# Patient Record
Sex: MALE | Race: OTHER
[De-identification: names, ages, dates, MRNs, and addresses within clinical notes are randomized per-mention and may not be internally consistent; named-entity substitution may affect disease eponyms.]

---

## 2019-08-30 ENCOUNTER — HOSPITAL ENCOUNTER (INPATIENT)
Dept: HOSPITAL 72 - EMR | Age: 74
LOS: 11 days | Discharge: NURSING, REHAB OR LONG-TERM CARE | DRG: 300 | End: 2019-09-10
Payer: COMMERCIAL

## 2019-08-30 VITALS — DIASTOLIC BLOOD PRESSURE: 62 MMHG | SYSTOLIC BLOOD PRESSURE: 141 MMHG

## 2019-08-30 VITALS — SYSTOLIC BLOOD PRESSURE: 138 MMHG | DIASTOLIC BLOOD PRESSURE: 77 MMHG

## 2019-08-30 VITALS — SYSTOLIC BLOOD PRESSURE: 146 MMHG | DIASTOLIC BLOOD PRESSURE: 72 MMHG

## 2019-08-30 VITALS — WEIGHT: 181 LBS | HEIGHT: 66 IN | BODY MASS INDEX: 29.09 KG/M2

## 2019-08-30 DIAGNOSIS — E11.65: ICD-10-CM

## 2019-08-30 DIAGNOSIS — Z79.4: ICD-10-CM

## 2019-08-30 DIAGNOSIS — E87.2: ICD-10-CM

## 2019-08-30 DIAGNOSIS — E11.52: Primary | ICD-10-CM

## 2019-08-30 DIAGNOSIS — E11.21: ICD-10-CM

## 2019-08-30 DIAGNOSIS — N32.0: ICD-10-CM

## 2019-08-30 DIAGNOSIS — Z79.84: ICD-10-CM

## 2019-08-30 DIAGNOSIS — L03.116: ICD-10-CM

## 2019-08-30 DIAGNOSIS — N18.3: ICD-10-CM

## 2019-08-30 DIAGNOSIS — E11.621: ICD-10-CM

## 2019-08-30 DIAGNOSIS — M86.8X7: ICD-10-CM

## 2019-08-30 DIAGNOSIS — N17.9: ICD-10-CM

## 2019-08-30 DIAGNOSIS — E86.0: ICD-10-CM

## 2019-08-30 DIAGNOSIS — E11.69: ICD-10-CM

## 2019-08-30 DIAGNOSIS — I12.9: ICD-10-CM

## 2019-08-30 DIAGNOSIS — E11.22: ICD-10-CM

## 2019-08-30 LAB
ADD MANUAL DIFF: NO
ALBUMIN SERPL-MCNC: 3.4 G/DL (ref 3.4–5)
ALBUMIN/GLOB SERPL: 0.8 {RATIO} (ref 1–2.7)
ALP SERPL-CCNC: 65 U/L (ref 46–116)
ALT SERPL-CCNC: 18 U/L (ref 12–78)
ANION GAP SERPL CALC-SCNC: 12 MMOL/L (ref 5–15)
APPEARANCE UR: CLEAR
APTT BLD: 29 SEC (ref 23–33)
APTT PPP: (no result) S
AST SERPL-CCNC: 14 U/L (ref 15–37)
BASOPHILS NFR BLD AUTO: 0.8 % (ref 0–2)
BILIRUB SERPL-MCNC: 0.3 MG/DL (ref 0.2–1)
BUN SERPL-MCNC: 39 MG/DL (ref 7–18)
CALCIUM SERPL-MCNC: 9.7 MG/DL (ref 8.5–10.1)
CHLORIDE SERPL-SCNC: 104 MMOL/L (ref 98–107)
CK MB SERPL-MCNC: 2.3 NG/ML (ref 0–3.6)
CK SERPL-CCNC: 135 U/L (ref 26–308)
CO2 SERPL-SCNC: 22 MMOL/L (ref 21–32)
CREAT SERPL-MCNC: 2.9 MG/DL (ref 0.55–1.3)
EOSINOPHIL NFR BLD AUTO: 1.6 % (ref 0–3)
ERYTHROCYTE [DISTWIDTH] IN BLOOD BY AUTOMATED COUNT: 10.4 % (ref 11.6–14.8)
GLOBULIN SER-MCNC: 4.5 G/DL
GLUCOSE UR STRIP-MCNC: (no result) MG/DL
HCT VFR BLD CALC: 32.5 % (ref 42–52)
HGB BLD-MCNC: 11.2 G/DL (ref 14.2–18)
INR PPP: 1 (ref 0.9–1.1)
KETONES UR QL STRIP: NEGATIVE
LEUKOCYTE ESTERASE UR QL STRIP: NEGATIVE
LYMPHOCYTES NFR BLD AUTO: 25.3 % (ref 20–45)
MCV RBC AUTO: 84 FL (ref 80–99)
MONOCYTES NFR BLD AUTO: 8.7 % (ref 1–10)
NEUTROPHILS NFR BLD AUTO: 63.7 % (ref 45–75)
NITRITE UR QL STRIP: NEGATIVE
PH UR STRIP: 5 [PH] (ref 4.5–8)
PLATELET # BLD: 297 K/UL (ref 150–450)
POTASSIUM SERPL-SCNC: 4.6 MMOL/L (ref 3.5–5.1)
PROT UR QL STRIP: (no result)
RBC # BLD AUTO: 3.85 M/UL (ref 4.7–6.1)
SODIUM SERPL-SCNC: 138 MMOL/L (ref 136–145)
SP GR UR STRIP: 1.01 (ref 1–1.03)
UROBILINOGEN UR-MCNC: NORMAL MG/DL (ref 0–1)
WBC # BLD AUTO: 10.2 K/UL (ref 4.8–10.8)

## 2019-08-30 PROCEDURE — 36569 INSJ PICC 5 YR+ W/O IMAGING: CPT

## 2019-08-30 PROCEDURE — 83605 ASSAY OF LACTIC ACID: CPT

## 2019-08-30 PROCEDURE — 85651 RBC SED RATE NONAUTOMATED: CPT

## 2019-08-30 PROCEDURE — 96367 TX/PROPH/DG ADDL SEQ IV INF: CPT

## 2019-08-30 PROCEDURE — 81003 URINALYSIS AUTO W/O SCOPE: CPT

## 2019-08-30 PROCEDURE — 87040 BLOOD CULTURE FOR BACTERIA: CPT

## 2019-08-30 PROCEDURE — 72195 MRI PELVIS W/O DYE: CPT

## 2019-08-30 PROCEDURE — 82962 GLUCOSE BLOOD TEST: CPT

## 2019-08-30 PROCEDURE — 84484 ASSAY OF TROPONIN QUANT: CPT

## 2019-08-30 PROCEDURE — 83735 ASSAY OF MAGNESIUM: CPT

## 2019-08-30 PROCEDURE — 80202 ASSAY OF VANCOMYCIN: CPT

## 2019-08-30 PROCEDURE — 93005 ELECTROCARDIOGRAM TRACING: CPT

## 2019-08-30 PROCEDURE — 76770 US EXAM ABDO BACK WALL COMP: CPT

## 2019-08-30 PROCEDURE — 82553 CREATINE MB FRACTION: CPT

## 2019-08-30 PROCEDURE — 87070 CULTURE OTHR SPECIMN AEROBIC: CPT

## 2019-08-30 PROCEDURE — 84300 ASSAY OF URINE SODIUM: CPT

## 2019-08-30 PROCEDURE — 85730 THROMBOPLASTIN TIME PARTIAL: CPT

## 2019-08-30 PROCEDURE — 76937 US GUIDE VASCULAR ACCESS: CPT

## 2019-08-30 PROCEDURE — 96361 HYDRATE IV INFUSION ADD-ON: CPT

## 2019-08-30 PROCEDURE — 85025 COMPLETE CBC W/AUTO DIFF WBC: CPT

## 2019-08-30 PROCEDURE — 99285 EMERGENCY DEPT VISIT HI MDM: CPT

## 2019-08-30 PROCEDURE — 87205 SMEAR GRAM STAIN: CPT

## 2019-08-30 PROCEDURE — 80048 BASIC METABOLIC PNL TOTAL CA: CPT

## 2019-08-30 PROCEDURE — 96365 THER/PROPH/DIAG IV INF INIT: CPT

## 2019-08-30 PROCEDURE — 82043 UR ALBUMIN QUANTITATIVE: CPT

## 2019-08-30 PROCEDURE — 82570 ASSAY OF URINE CREATININE: CPT

## 2019-08-30 PROCEDURE — 71045 X-RAY EXAM CHEST 1 VIEW: CPT

## 2019-08-30 PROCEDURE — 80053 COMPREHEN METABOLIC PANEL: CPT

## 2019-08-30 PROCEDURE — 83036 HEMOGLOBIN GLYCOSYLATED A1C: CPT

## 2019-08-30 PROCEDURE — 87181 SC STD AGAR DILUTION PER AGT: CPT

## 2019-08-30 PROCEDURE — 85610 PROTHROMBIN TIME: CPT

## 2019-08-30 PROCEDURE — 80061 LIPID PANEL: CPT

## 2019-08-30 PROCEDURE — 36415 COLL VENOUS BLD VENIPUNCTURE: CPT

## 2019-08-30 PROCEDURE — 93970 EXTREMITY STUDY: CPT

## 2019-08-30 PROCEDURE — 86140 C-REACTIVE PROTEIN: CPT

## 2019-08-30 PROCEDURE — 83690 ASSAY OF LIPASE: CPT

## 2019-08-30 PROCEDURE — 93925 LOWER EXTREMITY STUDY: CPT

## 2019-08-30 PROCEDURE — 83880 ASSAY OF NATRIURETIC PEPTIDE: CPT

## 2019-08-30 PROCEDURE — 82550 ASSAY OF CK (CPK): CPT

## 2019-08-30 RX ADMIN — ENOXAPARIN SODIUM SCH MG: 30 INJECTION SUBCUTANEOUS at 21:55

## 2019-08-30 NOTE — DIAGNOSTIC IMAGING REPORT
EXAM:

  XR Chest, 1 View

 

CLINICAL HISTORY:

  OSTEOMY

 

TECHNIQUE:

  Frontal view of the chest.

 

COMPARISON:

  No relevant prior studies available.

 

FINDINGS:

  Lungs:  No consolidation.

  Pleural space:  Unremarkable.  No pneumothorax.

  Heart:  Unremarkable.  No cardiomegaly.

  Mediastinum:  Unremarkable.

  Bones/joints:  No acute fracture.

 

IMPRESSION:     

  No acute cardiopulmonary disease.

## 2019-08-30 NOTE — HISTORY AND PHYSICAL
History of Present Illness


General


Date patient seen:  Aug 30, 2019


Time patient seen:  17:20


Reason for Hospitalization:  General Complaint





Present Illness


HPI


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 

left second toe.  This is been developing over the last couple of weeks after 

he used clippers to cut his toe nail and the whole nail was extracted 

accidentally.  He just came up from [place].  He is not taking antibiotics at 

this time.  He has peripheral neuropathy with diabetes and therefore the pain 

is not severe. He reports problems with his vision but denies eye surgeries in 

the past.  There is been no trauma and no drainage from the toe and there is 

foul smell identified.  Not seeing a foot specialist at this time.


Tetanus around 5 years ago.





Patient is a insulin-dependent diabetic.  His sugars have been mostly high.  

His wife helps him to maintain glucose control. Takes NPH 40 units AM and 20 

units PM.  He denies renal disease but states that his vision has been changing 

recently.  It is unclear whether he has had retinopathy or not. 





In the ED, antibiotics were started and admission is requested.


Allergies:  


Coded Allergies:  


     No Known Allergies (Unverified , 6/2/16)





Medication History


Scheduled


Hydromorphone Hcl/Pf (Hydromorphone Hcl 1 Mg/Ml Amp), 1 MG IVP Q4HR, (Reported)


Losartan Potassium* (Losartan Potassium*), 100 MG ORAL DAILY, (Reported)


Metoprolol Tartrate* (Metoprolol Tartrate*), Unknown Dose ORAL EVERY 12 HOURS, (

Reported)


Omeprazole (Omeprazole), Unknown Dose ORAL DAILY, (Reported)


Saline (Sodium Chloride), 10 ML IVF Q8HR, (Reported)





Scheduled PRN


Acetaminophen (Acetaminophen), 650 MG RC QID PRN for Fever/Headache/Mild Pain, (

Reported)





Miscellaneous Medications


Insulin Glargine (Lantus), 30 SUBQ, (Reported)


Ondansetron* (Zofran*), 4 MG IVP, (Reported)





Patient History


Healthcare decision maker





Resuscitation status





Advanced Directive on File








Review of Systems


All Other Systems:  negative except mentioned in HPI





Physical Exam


General Appearance:  WD/WN


Lines, tubes and drains:  peripheral, central line


HEENT:  normocephalic, atraumatic


Respiratory/Chest:  lungs clear


Cardiovascular/Chest:  normal peripheral pulses, normal rate, other - Left foot 

TA and DP 1 +


Abdomen:  non tender, soft


Extremities:  other - Left second toe with necrosis in the ungual bed and with 

foul smell


Neurologic:  CNs II-XII grossly normal, alert, oriented x 3, responsive





Last 24 Hour Vital Signs








  Date Time  Temp Pulse Resp B/P (MAP) Pulse Ox O2 Delivery O2 Flow Rate FiO2


 


8/30/19 17:00 98.2 104 18 141/62 98 Room Air  


 


8/30/19 17:00  104 18   Room Air  


 


8/30/19 16:04 98.2 107 18 163/69 (100) 98 Room Air  











Laboratory Tests








Test


  8/30/19


16:33 8/30/19


18:00


 


White Blood Count


  10.2 K/UL


(4.8-10.8) 


 


 


Red Blood Count


  3.85 M/UL


(4.70-6.10)  L 


 


 


Hemoglobin


  11.2 G/DL


(14.2-18.0)  L 


 


 


Hematocrit


  32.5 %


(42.0-52.0)  L 


 


 


Mean Corpuscular Volume 84 FL (80-99)   


 


Mean Corpuscular Hemoglobin


  29.0 PG


(27.0-31.0) 


 


 


Mean Corpuscular Hemoglobin


Concent 34.4 G/DL


(32.0-36.0) 


 


 


Red Cell Distribution Width


  10.4 %


(11.6-14.8)  L 


 


 


Platelet Count


  297 K/UL


(150-450) 


 


 


Mean Platelet Volume


  6.5 FL


(6.5-10.1) 


 


 


Neutrophils (%) (Auto)


  63.7 %


(45.0-75.0) 


 


 


Lymphocytes (%) (Auto)


  25.3 %


(20.0-45.0) 


 


 


Monocytes (%) (Auto)


  8.7 %


(1.0-10.0) 


 


 


Eosinophils (%) (Auto)


  1.6 %


(0.0-3.0) 


 


 


Basophils (%) (Auto)


  0.8 %


(0.0-2.0) 


 


 


Erythrocyte Sedimentation Rate


  91 MM/HR


(0-20)  H 


 


 


Prothrombin Time


  10.2 SEC


(9.30-11.50) 


 


 


Prothromb Time International


Ratio 1.0 (0.9-1.1)  


  


 


 


Activated Partial


Thromboplast Time 29 SEC (23-33)


  


 


 


Sodium Level


  138 MMOL/L


(136-145) 


 


 


Potassium Level


  4.6 MMOL/L


(3.5-5.1) 


 


 


Chloride Level


  104 MMOL/L


() 


 


 


Carbon Dioxide Level


  22 MMOL/L


(21-32) 


 


 


Anion Gap


  12 mmol/L


(5-15) 


 


 


Blood Urea Nitrogen


  39 mg/dL


(7-18)  H 


 


 


Creatinine


  2.9 MG/DL


(0.55-1.30)  H 


 


 


Estimat Glomerular Filtration


Rate  mL/min (>60)  


  


 


 


Glucose Level


  232 MG/DL


()  H 


 


 


Lactic Acid Level


  2.10 mmol/L


(0.4-2.0)  H 


 


 


Calcium Level


  9.7 MG/DL


(8.5-10.1) 


 


 


Magnesium Level


  2.0 MG/DL


(1.8-2.4) 


 


 


Total Bilirubin


  0.3 MG/DL


(0.2-1.0) 


 


 


Aspartate Amino Transf


(AST/SGOT) 14 U/L (15-37)


L 


 


 


Alanine Aminotransferase


(ALT/SGPT) 18 U/L (12-78)


  


 


 


Alkaline Phosphatase


  65 U/L


() 


 


 


Total Creatine Kinase


  135 U/L


() 


 


 


Creatine Kinase MB


  2.3 NG/ML


(0.0-3.6) 


 


 


Creatine Kinase MB Relative


Index 1.7  


  


 


 


Troponin I


  0.000 ng/mL


(0.000-0.056) 


 


 


Pro-B-Type Natriuretic Peptide


  1074 pg/mL


(0-125)  H 


 


 


Total Protein


  7.9 G/DL


(6.4-8.2) 


 


 


Albumin


  3.4 G/DL


(3.4-5.0) 


 


 


Globulin 4.5 g/dL   


 


Albumin/Globulin Ratio


  0.8 (1.0-2.7)


L 


 


 


Lipase


  134 U/L


() 


 


 


Urine Color  Pale yellow  


 


Urine Appearance  Clear  


 


Urine pH  5 (4.5-8.0)  


 


Urine Specific Gravity


  


  1.015


(1.005-1.035)


 


Urine Protein


  


  2+ (NEGATIVE)


H


 


Urine Glucose (UA)


  


  4+ (NEGATIVE)


H


 


Urine Ketones


  


  Negative


(NEGATIVE)


 


Urine Blood


  


  1+ (NEGATIVE)


H


 


Urine Nitrite


  


  Negative


(NEGATIVE)


 


Urine Bilirubin


  


  Negative


(NEGATIVE)


 


Urine Urobilinogen


  


  Normal MG/DL


(0.0-1.0)


 


Urine Leukocyte Esterase


  


  Negative


(NEGATIVE)


 


Urine RBC


  


  0 /HPF (0 - 0)


 


 


Urine WBC


  


  0-2 /HPF (0 -


0)


 


Urine Squamous Epithelial


Cells 


  Few /LPF


(NONE/OCC)


 


Urine Amorphous Sediment


  


  Few /LPF


(NONE)  H


 


Urine Bacteria


  


  Few /HPF


(NONE)








Height (Feet):  5


Height (Inches):  6.00


Weight (Pounds):  160


Medications





Current Medications








 Medications


  (Trade)  Dose


 Ordered  Sig/Sherly


 Route


 PRN Reason  Start Time


 Stop Time Status Last Admin


Dose Admin


 


 Acetaminophen


  (Tylenol)  650 mg  Q4H  PRN


 ORAL


 Mild Pain (Pain Scale 1-3)  8/30/19 18:30


 9/29/19 18:29   


 


 


 Acetaminophen/


 Hydrocodone Bitart


  (Norco 5/325)  1 tab  QIDPRN  PRN


 ORAL


 pain  8/30/19 18:30


 9/6/19 18:29   


 


 


 Cefepime HCl 1 gm/


 Sodium Chloride  55 ml @ 


 110 mls/hr  Q12H


 IV


   8/30/19 16:30


 8/31/19 16:29  8/30/19 16:39


 


 


 Dextrose


  (Dextrose 50%)  25 ml  Q30M  PRN


 IV


 Hypoglycemia  8/30/19 18:30


 9/29/19 18:29   


 


 


 Dextrose


  (Dextrose 50%)  50 ml  Q30M  PRN


 IV


 Hypoglycemia  8/30/19 18:30


 9/29/19 18:29   


 


 


 Enoxaparin Sodium


  (Lovenox)  30 mg  QHS


 SUBQ


   8/30/19 21:00


 9/29/19 20:59   


 


 


 Morphine Sulfate


  (Morphine


 Sulfate)  1 mg  Q2H  PRN


 IVP


 For Pain  8/30/19 18:30


 9/6/19 18:29   


 


 


 Pantoprazole


  (Protonix)  40 mg  DAILY


 ORAL


   8/31/19 09:00


 9/30/19 08:59   


 


 


 Polyethylene


 Glycol


  (Miralax)  17 gm  HSPRN  PRN


 ORAL


 Constipation  8/30/19 18:30


 9/29/19 18:29   


 


 


 Sodium Chloride  1,000 ml @ 


 100 mls/hr  Q10H


 IVLG


   8/30/19 20:00


 8/31/19 19:59   


 


 


 Sodium Chloride  1,000 ml @ 


 300 mls/hr  Q3H20M


 IV


   8/30/19 16:30


 9/29/19 16:29  8/30/19 16:39


 











Assessment/Plan


Status:  stable


Assessment/Plan:


72 y/o male admitted with L second toe foul smelling necrosis.





# L second toe distal dry gangrene


# Diabetic foot ulcer


Cefepime and Vancomycin started in the ER, will renally dose


ESR and CRP added


Consider ID consult in AM if needed after surgical evaluation by Dr. Meléndez. 

Possible need for vascular and podiatry consult





# Diabetic nephropathy 


# CKD 4


Patient denies prior renal disease and nephrology was consulted. Dr. Torres


IVF and monitor BMP


Add UA with microalbumin





# IDDM


Likely poorly controlled due to renal, ophtalmic and peripheral vascular disease


HbA1c


Titrate insulin and consider endocrinology input if A1c > 10





# Disposition


Inpatient


PT evaluation for possible need of assistive devices.





FULL CODE


DVT and GI ppx











Belle Castro MD Aug 30, 2019 18:43

## 2019-08-30 NOTE — EMERGENCY ROOM REPORT
History of Present Illness


General


Chief Complaint:  General Complaint


Source:  Patient, Significant Other, Medical Record





Present Illness


HPI


Patient presents with a discolored and smelly left second toe.  This is been 

developing over the last couple of weeks.  He just came up from [place].  He is 

not taking antibiotics at this time.  He has peripheral neuropathy with 

diabetes and therefore the pain is not severe.  There is been no trauma and no 

drainage from the toe.  Not seeing a foot specialist at this time.  Complains 

of occasional chills but no documented fever.  There is no drainage from the 

toe.  He does have some swelling in his lower extremities but denies calf pain.

  The pain 5/10, aching worse when the foot is dependent when there is 

throbbing.  He denies radiation of the pain.


Tetanus around 5 years ago.





Patient is a insulin-dependent diabetic.  His sugars have been mostly high.  

His wife helps him to maintain glucose control.  He denies renal disease but 

states that his vision has been changing recently.  It is unclear whether he 

has had retinopathy.





No sore throat, chest pain, palpitations, nausea, vomiting, diarrhea, dysuria, 

abdominal pain, shortness of breath, depression, anxiety, headache.





He was last admitted in 2016 for possible cholecystitis.


Allergies:  


Coded Allergies:  


     No Known Allergies (Unverified , 6/2/16)





Patient History


Past Medical History:  see triage record, old chart reviewed


Social History:  Denies: smoking, alcohol use, drug use


Social History Narrative


Born in [facility]


Reviewed Nursing Documentation:  PMH: Agreed; PSxH: Agreed





Nursing Documentation-PMH


Past Medical History:  No History, Except For


Hx Hypertension:  Yes


Hx COPD:  No - BPH


Hx Diabetes:  Yes





Review of Systems


All Other Systems:  negative except mentioned in HPI





Physical Exam





Vital Signs








  Date Time  Temp Pulse Resp B/P (MAP) Pulse Ox O2 Delivery O2 Flow Rate FiO2


 


8/30/19 16:04 98.2 107 18 163/69 (100) 98 Room Air  








Sp02 EP Interpretation:  reviewed, normal


General Appearance:  well appearing, no apparent distress, GCS 15


Head:  normocephalic


Eyes:  bilateral eye normal inspection, bilateral eye PERRL, bilateral eye EOMI


ENT:  moist mucus membranes


Neck:  supple


Respiratory:  lungs clear, normal breath sounds


Cardiovascular #1:  regular rate, rhythm, no JVD, no murmur, edema - 1-2+


Cardiovascular #2:  2+ dorsalis pedis (R), 2+ dorsalis pedis (L)


Gastrointestinal:  normal inspection, normal bowel sounds, non tender, no mass, 

non-distended


Musculoskeletal:  back normal, gait/station normal, normal range of motion


Neurologic:  alert, oriented x3


Psychiatric:  mood/affect normal


Skin:  other - Gangrenous left second toe with some erythema of the foot





Medical Decision Making


Diagnostic Impression:  


 Primary Impression:  


 Gangrene of toe of left foot


 Additional Impressions:  


 Elevated lactic acid level


 Hyperglycemia


 Renal failure


 Qualified Codes:  N19 - Unspecified kidney failure


ER Course


Patient presents with gangrenous left second toe.  Frontal includes acute gas 

gangrene, dry gangrene, osteomyelitis amongst others.  Evaluation will be with 

EKG, chest x-ray, foot x-ray and labs.  The patient will receive antibiotics 

after blood cultures have been performed.





EKG without injury.  CXR no infiltrate.  Foot with changes toe soft tissue.  

Min elevated WBC.  Elevated ESR.  Renal failure.  Elevated glucose.





Elevated lactic acid.  VS stable.  Bolus given.  Antibiotics begun.





Evaluated by Dr. Castro.  Consult Dr. Meléndez.





Admit med floor.





Improved lactate.  Discussed with patient renal disease.





Laboratory Tests








Test


  8/30/19


16:33 8/30/19


16:38 8/30/19


18:00 8/30/19


18:10


 


White Blood Count


  10.2 K/UL


(4.8-10.8) 


  


  


 


 


Red Blood Count


  3.85 M/UL


(4.70-6.10)  L 


  


  


 


 


Hemoglobin


  11.2 G/DL


(14.2-18.0)  L 


  


  


 


 


Hematocrit


  32.5 %


(42.0-52.0)  L 


  


  


 


 


Mean Corpuscular Volume 84 FL (80-99)     


 


Mean Corpuscular Hemoglobin


  29.0 PG


(27.0-31.0) 


  


  


 


 


Mean Corpuscular Hemoglobin


Concent 34.4 G/DL


(32.0-36.0) 


  


  


 


 


Red Cell Distribution Width


  10.4 %


(11.6-14.8)  L 


  


  


 


 


Platelet Count


  297 K/UL


(150-450) 


  


  


 


 


Mean Platelet Volume


  6.5 FL


(6.5-10.1) 


  


  


 


 


Neutrophils (%) (Auto)


  63.7 %


(45.0-75.0) 


  


  


 


 


Lymphocytes (%) (Auto)


  25.3 %


(20.0-45.0) 


  


  


 


 


Monocytes (%) (Auto)


  8.7 %


(1.0-10.0) 


  


  


 


 


Eosinophils (%) (Auto)


  1.6 %


(0.0-3.0) 


  


  


 


 


Basophils (%) (Auto)


  0.8 %


(0.0-2.0) 


  


  


 


 


Erythrocyte Sedimentation Rate


  91 MM/HR


(0-20)  H 


  


  


 


 


Prothrombin Time


  10.2 SEC


(9.30-11.50) 


  


  


 


 


Prothrombin Time INR 1.0 (0.9-1.1)     


 


PTT


  29 SEC (23-33)


  


  


  


 


 


Sodium Level


  138 MMOL/L


(136-145) 


  


  


 


 


Potassium Level


  4.6 MMOL/L


(3.5-5.1) 


  


  


 


 


Chloride Level


  104 MMOL/L


() 


  


  


 


 


Carbon Dioxide Level


  22 MMOL/L


(21-32) 


  


  


 


 


Anion Gap


  12 mmol/L


(5-15) 


  


  


 


 


Blood Urea Nitrogen


  39 mg/dL


(7-18)  H 


  


  


 


 


Creatinine


  2.9 MG/DL


(0.55-1.30)  H 


  


  


 


 


Estimate Glomerular


Filtration Rate  mL/min (>60)  


  


  


  


 


 


Glucose Level


  232 MG/DL


()  H 


  


  


 


 


Lactic Acid Level


  2.10 mmol/L


(0.4-2.0)  H 


  


  1.50 mmol/L


(0.66-2.22)


 


Calcium Level


  9.7 MG/DL


(8.5-10.1) 


  


  


 


 


Magnesium Level


  2.0 MG/DL


(1.8-2.4) 


  


  


 


 


Total Bilirubin


  0.3 MG/DL


(0.2-1.0) 


  


  


 


 


Aspartate Amino Transferase


(AST) 14 U/L (15-37)


L 


  


  


 


 


Alanine Aminotransferase (ALT)


  18 U/L (12-78)


  


  


  


 


 


Alkaline Phosphatase


  65 U/L


() 


  


  


 


 


Total Creatine Kinase


  135 U/L


() 


  


  


 


 


Creatine Kinase MB


  2.3 NG/ML


(0.0-3.6) 


  


  


 


 


Creatine Kinase MB Relative


Index 1.7  


  


  


  


 


 


Troponin I


  0.000 ng/mL


(0.000-0.056) 


  


  


 


 


Pro-B-Type Natriuretic Peptide


  1074 pg/mL


(0-125)  H 


  


  


 


 


Total Protein


  7.9 G/DL


(6.4-8.2) 


  


  


 


 


Albumin


  3.4 G/DL


(3.4-5.0) 


  


  


 


 


Globulin 4.5 g/dL     


 


Albumin/Globulin Ratio


  0.8 (1.0-2.7)


L 


  


  


 


 


Lipase


  134 U/L


() 


  


  


 


 


C-Reactive Protein,


Quantitative 


  3.3 mg/dL


(0.00-0.90)  H 


  


 


 


Urine Color   Pale yellow   


 


Urine Appearance   Clear   


 


Urine pH   5 (4.5-8.0)   


 


Urine Specific Gravity


  


  


  1.015


(1.005-1.035) 


 


 


Urine Protein


  


  


  2+ (NEGATIVE)


H 


 


 


Urine Glucose (UA)


  


  


  4+ (NEGATIVE)


H 


 


 


Urine Ketones


  


  


  Negative


(NEGATIVE) 


 


 


Urine Blood


  


  


  1+ (NEGATIVE)


H 


 


 


Urine Nitrite


  


  


  Negative


(NEGATIVE) 


 


 


Urine Bilirubin


  


  


  Negative


(NEGATIVE) 


 


 


Urine Urobilinogen


  


  


  Normal MG/DL


(0.0-1.0) 


 


 


Urine Leukocyte Esterase


  


  


  Negative


(NEGATIVE) 


 


 


Urine RBC


  


  


  0 /HPF (0 - 0)


  


 


 


Urine WBC


  


  


  0-2 /HPF (0 -


0) 


 


 


Urine Squamous Epithelial


Cells 


  


  Few /LPF


(NONE/OCC) 


 


 


Urine Amorphous Sediment


  


  


  Few /LPF


(NONE)  H 


 


 


Urine Bacteria


  


  


  Few /HPF


(NONE) 


 


 


Urine Random Creatinine   Pending   


 


Urine Random Microalbumin   Pending   


 


Urine Random Sodium


  


  


  60 mmol/L


() 


 


 


Urine Creatinine


  


  


  78.7 MG/DL


(30.0-125.0) 


 


 


Urine Microalbumin/Creatinine


Ratio 


  


  Pending  


  


 








EKG Diagnostic Results


Rate:  tachycardiac


Rhythm:  NSR


ST Segments:  no acute changes - low voltage





Rhythm Strip Diag. Results


EP Interpretation:  yes


Rhythm:  no PVC's, no ectopy, other - Sinus tachycardia





Chest X-Ray Diagnostic Results


Chest X-Ray Diagnostic Results :  


   Chest X-Ray Ordered:  Yes


   # of Views/Limited/Complete:  1 View


   Indication:  Other


   EP Interpretation:  Yes


   Interpretation:  no consolidation, no effusion, no pneumothorax


   Impression:  No acute disease


   Electronically Signed by:  Electronically signed by Reji Montalvo MD





Other X-Ray Diagnostic Results


Other X-Ray Diagnostic Results :  


   X-Ray ordered:  Left foot


   # of Views/Limited Vs Complete:  3 View


   Indication:  Other


   EP Interpretation:  Yes


   Interpretation:  no dislocation, no fractures, other - Osteopenia, vascular 

calcifications and soft tissue changes second toe


   Impression:  Other


   Electronically Signed by:  Electronically signed by Reji Montalvo MD





Last Vital Signs








  Date Time  Temp Pulse Resp B/P (MAP) Pulse Ox O2 Delivery O2 Flow Rate FiO2


 


8/31/19 00:00 99.0 95 18 125/60 (81) 97   


 


8/30/19 23:04      Room Air  








Status:  improved


Disposition:  ADMITTED AS INPATIENT


Condition:  Serious











Reji Montalvo MD Aug 30, 2019 16:27

## 2019-08-30 NOTE — NUR
HAND-OFF: 

Report given to BRANDON Guajardo. no orders to carry. report to [place] unit will be 
given at 1930.

## 2019-08-30 NOTE — NUR
NURSE NOTES:

Patient came from ER via gurney. A&OX4. IV site patent and intact. Belongings are checked. 
Noted infected 2nd left toe, picture taken. Bed in lowest position. Call light within reach. 
Will continue to monitor.

## 2019-08-30 NOTE — DIAGNOSTIC IMAGING REPORT
EXAM:

  XR Left Foot Complete, 3 or More Views

 

CLINICAL HISTORY:

  OSTEOMY

 

TECHNIQUE:

  Frontal, lateral and oblique views of the left foot.

 

COMPARISON:

  No relevant prior studies available.

 

FINDINGS:

  Bones/joints:  No acute fracture.  Degenerative changes.  Plantar and 

posterior calcaneal spurs.  Osteopenia.

  Soft tissues:  Soft tissue swelling.

  Vasculature:  Vascular calcifications.

 

IMPRESSION:     

  No acute fracture.

## 2019-08-30 NOTE — NUR
ER Nurse Note:



Pt a&ox4, VSS, baseline HR >100, RA, no signs of distress. Pt denies pain, n/v, 
no resp distress. Pt uses urinal at bedside. Black toe on LT foot, second 
digit, denies pain. Hx of DM. Dinner tray at bedside. Report given to BRANDON Davenport 
in [place] for continuity of care.

## 2019-08-30 NOTE — NUR
ED Nurse Note:





pt walked in c/o left 2nd toe pain, pt reports he's been having wound more than 
15 days but unable to recall exactly how any days. noted 2nd toe blackened 
discoloration with tenderness and foul odor on 2nd toe, no drainage at this 
time, will cont monitor.

## 2019-08-31 VITALS — DIASTOLIC BLOOD PRESSURE: 60 MMHG | SYSTOLIC BLOOD PRESSURE: 131 MMHG

## 2019-08-31 VITALS — SYSTOLIC BLOOD PRESSURE: 125 MMHG | DIASTOLIC BLOOD PRESSURE: 60 MMHG

## 2019-08-31 VITALS — DIASTOLIC BLOOD PRESSURE: 62 MMHG | SYSTOLIC BLOOD PRESSURE: 130 MMHG

## 2019-08-31 VITALS — SYSTOLIC BLOOD PRESSURE: 134 MMHG | DIASTOLIC BLOOD PRESSURE: 59 MMHG

## 2019-08-31 VITALS — DIASTOLIC BLOOD PRESSURE: 50 MMHG | SYSTOLIC BLOOD PRESSURE: 110 MMHG

## 2019-08-31 VITALS — SYSTOLIC BLOOD PRESSURE: 142 MMHG | DIASTOLIC BLOOD PRESSURE: 64 MMHG

## 2019-08-31 LAB
ADD MANUAL DIFF: NO
ANION GAP SERPL CALC-SCNC: 9 MMOL/L (ref 5–15)
BASOPHILS NFR BLD AUTO: 0.9 % (ref 0–2)
BUN SERPL-MCNC: 27 MG/DL (ref 7–18)
CALCIUM SERPL-MCNC: 8.3 MG/DL (ref 8.5–10.1)
CHLORIDE SERPL-SCNC: 111 MMOL/L (ref 98–107)
CHOLEST SERPL-MCNC: 106 MG/DL (ref ?–200)
CO2 SERPL-SCNC: 21 MMOL/L (ref 21–32)
CREAT SERPL-MCNC: 2.3 MG/DL (ref 0.55–1.3)
EOSINOPHIL NFR BLD AUTO: 1.6 % (ref 0–3)
ERYTHROCYTE [DISTWIDTH] IN BLOOD BY AUTOMATED COUNT: 11.2 % (ref 11.6–14.8)
HCT VFR BLD CALC: 29.5 % (ref 42–52)
HDLC SERPL-MCNC: 21 MG/DL (ref 40–60)
HGB BLD-MCNC: 9.8 G/DL (ref 14.2–18)
LYMPHOCYTES NFR BLD AUTO: 19.2 % (ref 20–45)
MCV RBC AUTO: 88 FL (ref 80–99)
MONOCYTES NFR BLD AUTO: 7.3 % (ref 1–10)
NEUTROPHILS NFR BLD AUTO: 71 % (ref 45–75)
PLATELET # BLD: 265 K/UL (ref 150–450)
POTASSIUM SERPL-SCNC: 4 MMOL/L (ref 3.5–5.1)
RBC # BLD AUTO: 3.37 M/UL (ref 4.7–6.1)
SODIUM SERPL-SCNC: 141 MMOL/L (ref 136–145)
TRIGL SERPL-MCNC: 138 MG/DL (ref 30–150)
WBC # BLD AUTO: 8.9 K/UL (ref 4.8–10.8)

## 2019-08-31 RX ADMIN — HUMAN INSULIN SCH UNITS: 100 INJECTION, SUSPENSION SUBCUTANEOUS at 06:19

## 2019-08-31 RX ADMIN — HUMAN INSULIN SCH UNITS: 100 INJECTION, SUSPENSION SUBCUTANEOUS at 17:47

## 2019-08-31 RX ADMIN — ENOXAPARIN SODIUM SCH MG: 30 INJECTION SUBCUTANEOUS at 20:59

## 2019-08-31 RX ADMIN — LOSARTAN POTASSIUM SCH MG: 50 TABLET, FILM COATED ORAL at 09:08

## 2019-08-31 RX ADMIN — SODIUM CHLORIDE SCH MLS/HR: 0.9 INJECTION INTRAVENOUS at 16:12

## 2019-08-31 NOTE — CONSULTATION
History of Present Illness


General


Reason for Hospitalization:  General Complaint





Present Illness


HPI


73 year old male presents with a discolored, necrotic, and foul smelling left 

second toe/ray.  This is been developing over the last couple of weeks.  He 

just came up from [place].  He is not taking antibiotics at this time.  He has 

peripheral neuropathy with diabetes and therefore the pain is not severe.  

There is been no trauma and no drainage from the toe.  Not seeing a foot 

specialist at this time.  Complains of occasional chills but no documented 

fever.  There is no drainage from the toe.  He does have some swelling in his 

lower extremities but denies calf pain.  The pain 5/10, aching worse when the 

foot is dependent when there is throbbing.  He denies radiation of the pain.


Tetanus around 5 years ago. Patient is a insulin-dependent diabetic.  His 

sugars have been mostly high.  His wife helps him to maintain glucose control.  

He denies renal disease but states that his vision has been changing recently.  

It is unclear whether he has had retinopathy. Surgery called to evaluate and 

assist with care


patient states he is happy to be in hospital and wants care for his wound as he 

does not understand how this happened.


Allergies:  


Coded Allergies:  


     No Known Allergies (Unverified , 6/2/16)





Medication History


Scheduled


Hydromorphone Hcl/Pf (Hydromorphone Hcl 1 Mg/Ml Amp), 1 MG IVP Q4HR, (Reported)


Losartan Potassium* (Losartan Potassium*), 100 MG ORAL DAILY, (Reported)


Metoprolol Tartrate* (Metoprolol Tartrate*), Unknown Dose ORAL EVERY 12 HOURS, (

Reported)


Omeprazole (Omeprazole), Unknown Dose ORAL DAILY, (Reported)


Saline (Sodium Chloride), 10 ML IVF Q8HR, (Reported)





Scheduled PRN


Acetaminophen (Acetaminophen), 650 MG RC QID PRN for Fever/Headache/Mild Pain, (

Reported)





Miscellaneous Medications


Insulin Glargine (Lantus), 30 SUBQ, (Reported)


Ondansetron* (Zofran*), 4 MG IVP, (Reported)





Patient History


History Provided By:  Patient, Medical Record, PMD


Healthcare decision maker





Resuscitation status


Full Code


Advanced Directive on File








Past Medical/Surgical History


Past Medical/Surgical History:  


(1) Cholecystitis with cholelithiasis


(2) Dehydration


(3) Fever


(4) Cholelithiasis


(5) HTN (hypertension)


(6) DM (diabetes mellitus)


(7) Elevated LFTs


(8) Renal insufficiency


(9) Biliary colic


(10) Hyperglycemia


(11) Renal failure


(12) Elevated lactic acid level


(13) Gangrene of toe of left foot





Review of Systems


Review of Symptoms


General ROS: no weight loss or fever


Psychological ROS: no depression or mood changes, no memory loss


Ophthalmic ROS: no visual changes or eye irritation


ENT ROS: no nasal congestion, hearing loss, dizziness


Allergy and Immunology ROS: no allergic symptoms or urticaria


Hematological and Lymphatic ROS: no swollen glands, unusual bleeding or bruising


Endocrine ROS: no polyuria, polydipsia, weight changes, temperature intolerance


Respiratory ROS: no cough, shortness of breath, or wheezing


Cardiovascular ROS: no chest pain or dyspnea on exertion


Gastrointestinal ROS: denies abdominal pain, no bright red blood in stool.


Musculoskeletal ROS: no myalgias or arthralgias


Neurological ROS: no TIA or stroke symptoms


Dermatological ROS: no new or changing skin lesions, rashes or pruritis





Physical Exam


Physical Exam


General appearance:  alert, cooperative, no distress, appears stated age


Head:  Normocephalic, without obvious abnormality, atraumatic


Eyes:  conjunctivae/corneas clear. PERRL, EOM's intact. Fundi benign


Throat:  Lips, mucosa, and tongue normal. Teeth and gums normal


Neck:  supple, symmetrical, trachea midline, no adenopathy, thyroid: not 

enlarged, symmetric, no tenderness/mass/nodules, no carotid bruit and no JVD


Lungs:  clear to auscultation bilaterally


Heart:  regular rate and rhythm, S1, S2 normal, no murmur, click, rub or gallop


Abdomen:  soft, non-tender. Bowel sounds normal. No masses,  no organomegaly


Extremities:  extremities normal, atraumatic, necrotic left second toe. foul 

odor. no drainage 


Pulses:  2+ and symmetric


Skin:  Skin color, texture, turgor normal. No rashes or lesions


Neurologic:  Grossly normal





Last 24 Hour Vital Signs








  Date Time  Temp Pulse Resp B/P (MAP) Pulse Ox O2 Delivery O2 Flow Rate FiO2


 


8/31/19 16:00 98.8 83 19 134/59 (84) 99   


 


8/31/19 12:00 98.0 80 17 110/50 (70) 99   


 


8/31/19 09:08    131/60    


 


8/31/19 09:00      Room Air  


 


8/31/19 08:00 97.9 81 19 131/60 (83) 97   


 


8/31/19 04:00 98.2 84 18 130/62 (84) 97   


 


8/31/19 00:00 99.0 95 18 125/60 (81) 97   


 


8/30/19 23:04      Room Air  


 


8/30/19 21:54  111  130/63    


 


8/30/19 21:00      Room Air  


 


8/30/19 19:40 98.1 110 18 146/72 98 Room Air  


 


8/30/19 19:40 98.1 110 18 146/72 98 Room Air  


 


8/30/19 18:55 98.0 101 18 138/77 98 Room Air  

















Intake and Output  


 


 8/30/19 8/31/19





 19:00 07:00


 


Intake Total 3860 ml 1020 ml


 


Balance 3860 ml 1020 ml


 


  


 


Intake Oral 30 ml 120 ml


 


IV Total 3830 ml 900 ml


 


# Voids  3


 


# Bowel Movements  1











Laboratory Tests








Test


  8/30/19


22:50 8/31/19


06:00


 


Lactic Acid Level


  1.50 mmol/L


(0.4-2.0) 


 


 


White Blood Count


  


  8.9 K/UL


(4.8-10.8)


 


Red Blood Count


  


  3.37 M/UL


(4.70-6.10)  L


 


Hemoglobin


  


  9.8 G/DL


(14.2-18.0)  L


 


Hematocrit


  


  29.5 %


(42.0-52.0)  L


 


Mean Corpuscular Volume  88 FL (80-99)  


 


Mean Corpuscular Hemoglobin


  


  28.9 PG


(27.0-31.0)


 


Mean Corpuscular Hemoglobin


Concent 


  33.1 G/DL


(32.0-36.0)


 


Red Cell Distribution Width


  


  11.2 %


(11.6-14.8)  L


 


Platelet Count


  


  265 K/UL


(150-450)


 


Mean Platelet Volume


  


  6.5 FL


(6.5-10.1)


 


Neutrophils (%) (Auto)


  


  71.0 %


(45.0-75.0)


 


Lymphocytes (%) (Auto)


  


  19.2 %


(20.0-45.0)  L


 


Monocytes (%) (Auto)


  


  7.3 %


(1.0-10.0)


 


Eosinophils (%) (Auto)


  


  1.6 %


(0.0-3.0)


 


Basophils (%) (Auto)


  


  0.9 %


(0.0-2.0)


 


Sodium Level


  


  141 MMOL/L


(136-145)


 


Potassium Level


  


  4.0 MMOL/L


(3.5-5.1)


 


Chloride Level


  


  111 MMOL/L


()  H


 


Carbon Dioxide Level


  


  21 MMOL/L


(21-32)


 


Anion Gap


  


  9 mmol/L


(5-15)


 


Blood Urea Nitrogen


  


  27 mg/dL


(7-18)  H


 


Creatinine


  


  2.3 MG/DL


(0.55-1.30)  H


 


Estimat Glomerular Filtration


Rate 


   mL/min (>60)  


 


 


Glucose Level


  


  147 MG/DL


()  H


 


Hemoglobin A1c


  


  10.4 %


(4.3-6.0)  H


 


Calcium Level


  


  8.3 MG/DL


(8.5-10.1)  L


 


Triglycerides Level


  


  138 MG/DL


()


 


Cholesterol Level


  


  106 MG/DL (<


200)


 


LDL Cholesterol


  


  63 mg/dL


(<100)


 


HDL Cholesterol


  


  21 MG/DL


(40-60)  L


 


Cholesterol/HDL Ratio


  


  5.0 (3.3-4.4)


H


 


Random Vancomycin Level  6.9 ug/mL  








Height (Feet):  5


Height (Inches):  6.00


Weight (Pounds):  160


Medications





Current Medications








 Medications


  (Trade)  Dose


 Ordered  Sig/Sherly


 Route


 PRN Reason  Start Time


 Stop Time Status Last Admin


Dose Admin


 


 Acetaminophen


  (Tylenol)  650 mg  Q4H  PRN


 ORAL


 Mild Pain (Pain Scale 1-3)  8/30/19 18:30


 9/29/19 18:29   


 


 


 Acetaminophen/


 Hydrocodone Bitart


  (Norco 5/325)  1 tab  QIDPRN  PRN


 ORAL


 pain  8/30/19 18:30


 9/6/19 18:29   


 


 


 Cefepime HCl 1 gm/


 Dextrose  55 ml @ 


 110 mls/hr  Q24H


 IVPB


   8/31/19 16:00


 9/7/19 15:59  8/31/19 16:12


 


 


 Dextrose


  (Dextrose 50%)  25 ml  Q30M  PRN


 IV


 Hypoglycemia  8/30/19 18:30


 9/29/19 18:29   


 


 


 Dextrose


  (Dextrose 50%)  50 ml  Q30M  PRN


 IV


 Hypoglycemia  8/30/19 18:30


 9/29/19 18:29   


 


 


 Enoxaparin Sodium


  (Lovenox)  30 mg  QHS


 SUBQ


   8/30/19 21:00


 9/29/19 20:59  8/30/19 21:55


 


 


 Insulin Human NPH


  (Humulin N)  20 units  BEFORE  DINNER


 SUBQ


   8/31/19 16:30


 9/30/19 16:29  8/31/19 17:47


 


 


 Insulin Human NPH


  (Humulin N)  40 units  BEFORE  BREAKFAST


 SUBQ


   8/31/19 06:30


 9/30/19 06:29  8/31/19 06:19


 


 


 Losartan Potassium


  (Cozaar)  100 mg  DAILY


 ORAL


   8/31/19 09:00


 9/30/19 08:59  8/31/19 09:08


 


 


 Metoprolol


 Tartrate


  (Lopressor)  100 mg  BEDTIME


 ORAL


   8/31/19 21:00


 9/30/19 20:59   


 


 


 Morphine Sulfate


  (Morphine


 Sulfate)  1 mg  Q2H  PRN


 IVP


 For Pain  8/30/19 18:30


 9/6/19 18:29   


 


 


 Pantoprazole


  (Protonix)  40 mg  DAILY


 ORAL


   8/31/19 09:00


 9/30/19 08:59  8/31/19 09:08


 


 


 Polyethylene


 Glycol


  (Miralax)  17 gm  HSPRN  PRN


 ORAL


 Constipation  8/30/19 18:30


 9/29/19 18:29   


 


 


 Sodium Chloride  1,000 ml @ 


 100 mls/hr  Q10H


 IVLG


   8/30/19 20:00


 8/31/19 19:59  8/31/19 09:00


 


 


 Vancomycin HCl


  (Vanco rx to


 dose)  1 ea  DAILY  PRN


 MISC


 Per rx protocol  8/30/19 18:30


 9/29/19 18:29   


 











Assessment/Plan


Problem List:  


(1) Elevated lactic acid level


Assessment & Plan:  Patient presented with lactic acidosis, cellulitis and 

infection of his left foot with necrosis and gangrene of the left second toe.


Continue with his resuscitation


Okay for diet


ICD Codes:  R79.89 - Other specified abnormal findings of blood chemistry


SNOMED:  3030863


(2) Gangrene of toe of left foot


Assessment & Plan:  Patient presents with left second ray gangrene dry foul-

smelling no purulent drainage edema and cellulitis of the foot.  Has had it for 

a few weeks and worsening since his trip to [place].


Plain films noted no fracture


Recommend MRI of the left foot which can be performed when available IV 

antibiotics as per infectious disease





Swab necrotic area of left foot with Betadine wash foot daily apply gauze 

dressing


We will follow with recommendations as results of imaging are available


We will attempt to salvage as much possible but potentially may need an 

amputation


Strict glucose monitoring and control


Thank you will follow with recommendations


ICD Codes:  I96 - Gangrene, not elsewhere classified


SNOMED:  58555370154874658


(3) DM (diabetes mellitus)


ICD Codes:  E11.9 - Type 2 diabetes mellitus without complications


SNOMED:  63785135











Raciel Meléndez Aug 31, 2019 18:22

## 2019-08-31 NOTE — GENERAL PROGRESS NOTE
Assessment/Plan


Problem List:  


(1) Hyperglycemia


ICD Codes:  R73.9 - Hyperglycemia, unspecified


SNOMED:  75803838


(2) Renal failure


ICD Codes:  N19 - Unspecified kidney failure


SNOMED:  38190792


Qualifiers:  


   Qualified Codes:  N19 - Unspecified kidney failure


(3) Elevated lactic acid level


ICD Codes:  R79.89 - Other specified abnormal findings of blood chemistry


SNOMED:  7859195


(4) Gangrene of toe of left foot


ICD Codes:  I96 - Gangrene, not elsewhere classified


SNOMED:  43799726865501445


(5) DM (diabetes mellitus)


ICD Codes:  E11.9 - Type 2 diabetes mellitus without complications


SNOMED:  58276957


Status:  stable


Assessment/Plan:


72 y/o male admitted with L second toe foul smelling necrosis.





# L second toe distal dry gangrene


# Diabetic foot ulcer


Cefepime and Vancomycin- renal dose 


ESR and CRP added


Possible need for vascular and podiatry consult


MRI foot 


Surgical consult appreciated 


Lactic acid trending down 








# Diabetic nephropathy 


# CKD 4


Patient denies prior renal disease and nephrology was consulted. Dr. Torres


IVF and monitor BMP


UA with microalbumin





# IDDM


Likely poorly controlled due to renal, ophthalmic and peripheral vascular 

disease


HbA1c


Titrate insulin and consider endocrinology input if A1c > 10





# Disposition


PT evaluation for possible need of assistive devices.





FULL CODE


DVT and GI ppx





Subjective


Date patient seen:  Aug 31, 2019


ROS Limited/Unobtainable:  No


Constitutional:  Denies: no symptoms, chills, diaphoresis, fever, malaise, 

weakness, other


HEENT:  Denies: no symptoms, eye pain, blurred vision, tearing, double vision, 

ear pain, ear discharge, nose pain, nose congestion, throat pain, throat 

swelling, mouth pain, mouth swelling, other


Cardiovascular:  Denies: no symptoms, chest pain, edema, irregular heart rate, 

lightheadedness, palpitations, syncope, other


Gastrointestinal/Abdominal:  Denies: no symptoms, abdomen distended, abdominal 

pain, black stools, tarry stools, blood in stool, constipated, diarrhea, 

difficulty swallowing, nausea, poor appetite, poor fluid intake, rectal bleeding

, vomiting, other


Genitourinary:  Denies: no symptoms, burning, discharge, frequency, flank pain, 

hematuria, incontinence, pain, urgency, other


Neurologic/Psychiatric:  Denies: no symptoms, anxiety, depressed, emotional 

problems, headache, numbness, paresthesia, pre-existing deficit, seizure, 

tingling, tremors, weakness, other


Endocrine:  Denies: no symptoms, excessive sweating, flushing, intolerance to 

cold, intolerance to heat, increased hunger, increased thirst, increased urine, 

unexplained weight gain, unexplained weight loss, other


Hematologic/Lymphatic:  Denies: no symptoms, anemia, easy bleeding, easy 

bruising, other


Allergies:  


Coded Allergies:  


     No Known Allergies (Unverified , 6/2/16)


Subjective


Seen and examined


Denies pain. 


he is resting comfortably





Objective





Last 24 Hour Vital Signs








  Date Time  Temp Pulse Resp B/P (MAP) Pulse Ox O2 Delivery O2 Flow Rate FiO2


 


8/31/19 16:00 98.8 83 19 134/59 (84) 99   


 


8/31/19 12:00 98.0 80 17 110/50 (70) 99   


 


8/31/19 09:08    131/60    


 


8/31/19 09:00      Room Air  


 


8/31/19 08:00 97.9 81 19 131/60 (83) 97   


 


8/31/19 04:00 98.2 84 18 130/62 (84) 97   


 


8/31/19 00:00 99.0 95 18 125/60 (81) 97   


 


8/30/19 23:04      Room Air  


 


8/30/19 21:54  111  130/63    


 


8/30/19 21:00      Room Air  

















Intake and Output  


 


 8/30/19 8/31/19





 19:00 07:00


 


Intake Total 3860 ml 1020 ml


 


Balance 3860 ml 1020 ml


 


  


 


Intake Oral 30 ml 120 ml


 


IV Total 3830 ml 900 ml


 


# Voids  3


 


# Bowel Movements  1








Laboratory Tests


8/30/19 22:50: Lactic Acid Level 1.50


8/31/19 06:00: 


White Blood Count 8.9, Red Blood Count 3.37L, Hemoglobin 9.8L, Hematocrit 29.5L

, Mean Corpuscular Volume 88, Mean Corpuscular Hemoglobin 28.9, Mean 

Corpuscular Hemoglobin Concent 33.1, Red Cell Distribution Width 11.2L, 

Platelet Count 265, Mean Platelet Volume 6.5, Neutrophils (%) (Auto) 71.0, 

Lymphocytes (%) (Auto) 19.2L, Monocytes (%) (Auto) 7.3, Eosinophils (%) (Auto) 

1.6, Basophils (%) (Auto) 0.9, Sodium Level 141, Potassium Level 4.0, Chloride 

Level 111H, Carbon Dioxide Level 21, Anion Gap 9, Blood Urea Nitrogen 27H, 

Creatinine 2.3H, Estimat Glomerular Filtration Rate , Glucose Level 147H, 

Hemoglobin A1c 10.4H, Calcium Level 8.3L, Triglycerides Level 138, Cholesterol 

Level 106, LDL Cholesterol 63, HDL Cholesterol 21L, Cholesterol/HDL Ratio 5.0H, 

Random Vancomycin Level 6.9


Height (Feet):  5


Height (Inches):  6.00


Weight (Pounds):  160


Objective


General appearance:  alert, cooperative, no distress, appears stated age


Head:  Normocephalic, without obvious abnormality, atraumatic


Eyes:  conjunctivae/corneas clear. PERRL, EOM's intact. Fundi benign


Throat:  Lips, mucosa, and tongue normal. Teeth and gums normal


Neck:  supple, symmetrical, trachea midline, no adenopathy, thyroid: not 

enlarged, symmetric, no tenderness/mass/nodules, no carotid bruit and no JVD


Lungs:  clear to auscultation bilaterally


Heart:  regular rate and rhythm, S1, S2 normal, no murmur, click, rub or gallop


Abdomen:  soft, non-tender. Bowel sounds normal. No masses,  no organomegaly


Extremities:  extremities normal, atraumatic, necrotic left second toe. foul 

odor. no drainage 


Pulses:  2+ and symmetric


Skin:  Skin color, texture, turgor normal. No rashes or lesions


Neurologic:  Grossly normal











Guillermo Tompkins M.D. Aug 31, 2019 19:40

## 2019-08-31 NOTE — NUR
PT Note

PT eval completed, treatment initiated. Patient is untrained in the use of a FWW  with PWB 
on the LLE. Patient needs PT to instruct and train patient on the use of the FWW to minimize 
pressure on the left foot.

-------------------------------------------------------------------------------

Addendum: 08/31/19 at 1713 by EMMY ERICKSON PT

-------------------------------------------------------------------------------

Amended: Links added.

## 2019-08-31 NOTE — NUR
NURSE NOTES:

Rn offered lactulos and docusate. Patient only consumed half of it. Patient refused to take 
more waving her hands and closing her mouth and pushed the medication cup. RN explained the 
risks and benefits.Patient had 4 times of bowel movement.Will continue to monitor.

## 2019-08-31 NOTE — NUR
NURSE NOTES:

Received patient in bed, awake, alert and oriented x4. Not in respiratory/cardiac distress. 
No s/s of hypo/hyperglycemia. Patient is in mild pain on his left 2nd toe but bearable 
without pain medication @ this time. Bed is in lowest position and locked. Call light within 
reach. Will continue plan of care.

## 2019-08-31 NOTE — NUR
NURSE NOTES:

Report received from [employer]. Patient aao x 4. Breathing unlabored without distress, 
discomfort, or sob. Denies pain at this time except for on the left second toe that is 
consistent. Will follow appropriately. IV on the left antecubital intact running fluid as 
ordered. Bed placed at the lowest with brakes and side rails up for safety. Call light 
placed within reach and encouraged to use. Will continue to monitor and provide care as 
ordered.

## 2019-09-01 VITALS — SYSTOLIC BLOOD PRESSURE: 151 MMHG | DIASTOLIC BLOOD PRESSURE: 67 MMHG

## 2019-09-01 VITALS — DIASTOLIC BLOOD PRESSURE: 69 MMHG | SYSTOLIC BLOOD PRESSURE: 116 MMHG

## 2019-09-01 VITALS — SYSTOLIC BLOOD PRESSURE: 141 MMHG | DIASTOLIC BLOOD PRESSURE: 71 MMHG

## 2019-09-01 VITALS — SYSTOLIC BLOOD PRESSURE: 129 MMHG | DIASTOLIC BLOOD PRESSURE: 71 MMHG

## 2019-09-01 VITALS — SYSTOLIC BLOOD PRESSURE: 141 MMHG | DIASTOLIC BLOOD PRESSURE: 58 MMHG

## 2019-09-01 VITALS — SYSTOLIC BLOOD PRESSURE: 140 MMHG | DIASTOLIC BLOOD PRESSURE: 71 MMHG

## 2019-09-01 LAB
ADD MANUAL DIFF: NO
ALBUMIN SERPL-MCNC: 2.7 G/DL (ref 3.4–5)
ALBUMIN/GLOB SERPL: 0.7 {RATIO} (ref 1–2.7)
ALP SERPL-CCNC: 57 U/L (ref 46–116)
ALT SERPL-CCNC: 14 U/L (ref 12–78)
ANION GAP SERPL CALC-SCNC: 9 MMOL/L (ref 5–15)
APTT BLD: 32 SEC (ref 23–33)
AST SERPL-CCNC: 15 U/L (ref 15–37)
BASOPHILS NFR BLD AUTO: 0.8 % (ref 0–2)
BILIRUB SERPL-MCNC: 0.4 MG/DL (ref 0.2–1)
BUN SERPL-MCNC: 21 MG/DL (ref 7–18)
CALCIUM SERPL-MCNC: 8.8 MG/DL (ref 8.5–10.1)
CHLORIDE SERPL-SCNC: 111 MMOL/L (ref 98–107)
CO2 SERPL-SCNC: 22 MMOL/L (ref 21–32)
CREAT SERPL-MCNC: 1.9 MG/DL (ref 0.55–1.3)
EOSINOPHIL NFR BLD AUTO: 2.9 % (ref 0–3)
ERYTHROCYTE [DISTWIDTH] IN BLOOD BY AUTOMATED COUNT: 11.1 % (ref 11.6–14.8)
GLOBULIN SER-MCNC: 4 G/DL
HCT VFR BLD CALC: 31.2 % (ref 42–52)
HGB BLD-MCNC: 10.3 G/DL (ref 14.2–18)
INR PPP: 1 (ref 0.9–1.1)
LYMPHOCYTES NFR BLD AUTO: 27.1 % (ref 20–45)
MCV RBC AUTO: 88 FL (ref 80–99)
MONOCYTES NFR BLD AUTO: 7.2 % (ref 1–10)
NEUTROPHILS NFR BLD AUTO: 62 % (ref 45–75)
PLATELET # BLD: 263 K/UL (ref 150–450)
POTASSIUM SERPL-SCNC: 4.1 MMOL/L (ref 3.5–5.1)
RBC # BLD AUTO: 3.56 M/UL (ref 4.7–6.1)
SODIUM SERPL-SCNC: 142 MMOL/L (ref 136–145)
WBC # BLD AUTO: 8.1 K/UL (ref 4.8–10.8)

## 2019-09-01 RX ADMIN — LOSARTAN POTASSIUM SCH MG: 50 TABLET, FILM COATED ORAL at 09:08

## 2019-09-01 RX ADMIN — SODIUM CHLORIDE SCH MLS/HR: 0.9 INJECTION INTRAVENOUS at 16:29

## 2019-09-01 RX ADMIN — HUMAN INSULIN SCH UNITS: 100 INJECTION, SUSPENSION SUBCUTANEOUS at 06:33

## 2019-09-01 RX ADMIN — HUMAN INSULIN SCH UNITS: 100 INJECTION, SUSPENSION SUBCUTANEOUS at 16:31

## 2019-09-01 RX ADMIN — ENOXAPARIN SODIUM SCH MG: 30 INJECTION SUBCUTANEOUS at 21:33

## 2019-09-01 NOTE — NUR
NURSE NOTES:

Patient's previous IV site infiltrated and leaking. Placed new 22g IV on the right forearm. 
Explained procedure before and during the procedure. Patient agreed and tolerated the 
procedure well. New IV intact, dry, clean, and patent.

## 2019-09-01 NOTE — GENERAL PROGRESS NOTE
Assessment/Plan


Problem List:  


(1) Hyperglycemia


ICD Codes:  R73.9 - Hyperglycemia, unspecified


SNOMED:  54064222


(2) Renal failure


ICD Codes:  N19 - Unspecified kidney failure


SNOMED:  81219994


Qualifiers:  


   Qualified Codes:  N19 - Unspecified kidney failure


(3) Elevated lactic acid level


ICD Codes:  R79.89 - Other specified abnormal findings of blood chemistry


SNOMED:  2966634


(4) Gangrene of toe of left foot


ICD Codes:  I96 - Gangrene, not elsewhere classified


SNOMED:  [id number]


(5) DM (diabetes mellitus)


ICD Codes:  E11.9 - Type 2 diabetes mellitus without complications


SNOMED:  93141844


Status:  stable


Assessment/Plan:


72 y/o male admitted with L second toe foul smelling necrosis.





# L second toe distal dry gangrene


# Diabetic foot ulcer


Cefepime and Vancomycin- renal dose 


ESR and CRP added


Possible need for vascular and podiatry consult


MRI foot pending 


Surgical consult appreciated 


Lactic acid trending down 








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 


Patient denies prior renal disease and nephrology was consulted. Dr. Torres


IVF and monitor BMP


UA with microalbumin


Renal US 


Hold Losartan 





# IDDM- poorly controlled- HbA1c 10.4


finger stick glucose has been less than 200 on NPH 40 AM, 20 PM 


diabetic diet 





# Disposition


PT evaluation for possible need of assistive devices.





FULL CODE


DVT and GI ppx





Subjective


Date patient seen:  Sep 1, 2019


ROS Limited/Unobtainable:  No


Constitutional:  Denies: no symptoms, chills, diaphoresis, fever, malaise, 

weakness, other


HEENT:  Denies: no symptoms, eye pain, blurred vision, tearing, double vision, 

ear pain, ear discharge, nose pain, nose congestion, throat pain, throat 

swelling, mouth pain, mouth swelling, other


Cardiovascular:  Denies: no symptoms, chest pain, edema, irregular heart rate, 

lightheadedness, palpitations, syncope, other


Respiratory:  Denies: no symptoms, cough, orthopnea, shortness of breath, SOB 

with excertion, SOB at rest, sputum, stridor, wheezing, other


Gastrointestinal/Abdominal:  Denies: no symptoms, abdomen distended, abdominal 

pain, black stools, tarry stools, blood in stool, constipated, diarrhea, 

difficulty swallowing, nausea, poor appetite, poor fluid intake, rectal bleeding

, vomiting, other


Endocrine:  Denies: no symptoms, excessive sweating, flushing, intolerance to 

cold, intolerance to heat, increased hunger, increased thirst, increased urine, 

unexplained weight gain, unexplained weight loss, other


Allergies:  


Coded Allergies:  


     No Known Allergies (Unverified , 6/2/16)


Subjective


Seen and examined


c/o mild pain in the toe. 


he is resting comfortably





Objective





Last 24 Hour Vital Signs








  Date Time  Temp Pulse Resp B/P (MAP) Pulse Ox O2 Delivery O2 Flow Rate FiO2


 


9/1/19 12:00 98.7 83 18 140/71 (94) 98   


 


9/1/19 09:08    141/58    


 


9/1/19 09:00      Room Air  


 


9/1/19 08:00 97.3 83 18 141/58 (85) 98   


 


9/1/19 04:00 97.9 76 20 141/71 (94) 79   


 


9/1/19 00:00 97.8 77 18 129/71 (90) 99   


 


8/31/19 21:00      Room Air  


 


8/31/19 20:54  89  141/68    


 


8/31/19 20:00 97.9 89 20 142/64 (90) 99   


 


8/31/19 16:00 98.8 83 19 134/59 (84) 99   

















Intake and Output  


 


 8/31/19 9/1/19





 18:59 06:59


 


Intake Total 1875.000 ml 200 ml


 


Output Total  1000 ml


 


Balance 1875.000 ml -800 ml


 


  


 


IV Total 375.000 ml 200 ml


 


Other 1500 ml 


 


Output Urine Total  1000 ml


 


# Voids  2








Laboratory Tests


9/1/19 07:15: 


White Blood Count 8.1, Red Blood Count 3.56L, Hemoglobin 10.3L, Hematocrit 31.2L

, Mean Corpuscular Volume 88, Mean Corpuscular Hemoglobin 28.9, Mean 

Corpuscular Hemoglobin Concent 33.0, Red Cell Distribution Width 11.1L, 

Platelet Count 263, Mean Platelet Volume 6.3L, Neutrophils (%) (Auto) 62.0, 

Lymphocytes (%) (Auto) 27.1, Monocytes (%) (Auto) 7.2, Eosinophils (%) (Auto) 

2.9, Basophils (%) (Auto) 0.8, Erythrocyte Sedimentation Rate 64H, Prothrombin 

Time 10.3, Prothromb Time International Ratio 1.0, Activated Partial 

Thromboplast Time 32, Sodium Level 142, Potassium Level 4.1, Chloride Level 111H

, Carbon Dioxide Level 22, Anion Gap 9, Blood Urea Nitrogen 21H, Creatinine 1.9H

, Estimat Glomerular Filtration Rate , Glucose Level 148H, Calcium Level 8.8, 

Total Bilirubin 0.4, Aspartate Amino Transf (AST/SGOT) 15, Alanine 

Aminotransferase (ALT/SGPT) 14, Alkaline Phosphatase 57, C-Reactive Protein, 

Quantitative 4.0H, Total Protein 6.7, Albumin 2.7L, Globulin 4.0, Albumin/

Globulin Ratio 0.7L


Height (Feet):  5


Height (Inches):  6.00


Weight (Pounds):  160


Objective


General appearance:  alert, cooperative, no distress, appears stated age


Head:  Normocephalic, without obvious abnormality, atraumatic


Eyes:  conjunctivae/corneas clear. PERRL, EOM's intact. Fundi benign


Throat:  Lips, mucosa, and tongue normal. Teeth and gums normal


Neck:  supple, symmetrical, trachea midline, no adenopathy, thyroid: not 

enlarged, symmetric, no tenderness/mass/nodules, no carotid bruit and no JVD


Lungs:  clear to auscultation bilaterally


Heart:  regular rate and rhythm, S1, S2 normal, no murmur, click, rub or gallop


Abdomen:  soft, non-tender. Bowel sounds normal. No masses,  no organomegaly


Extremities:  extremities normal, atraumatic, necrotic left second toe. foul 

odor. no drainage 


Pulses:  2+ and symmetric


Skin:  Skin color, texture, turgor normal. No rashes or lesions


Neurologic:  Grossly normal











Guillermo Tompkins M.D. Sep 1, 2019 15:26

## 2019-09-01 NOTE — NUR
NURSE NOTES:

Received pt resting in bed. AAO x 4. On room air. IV site intact and patent. Dressing on L 
foot dry and intact. No c/o pain and no acute distress noted at this time. Bed locked, 
lowest position, alarm on, side rails up x 2, call light within reach. Will continue to 
monitor.

## 2019-09-01 NOTE — CONSULTATION
History of Present Illness


General


Chief Complaint:  General Complaint


Reason for Consultation:  acute kidney injury





Present Illness


HPI


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 

left second toe.  This is been developing over the last couple of weeks after 

he used clippers to cut his toe nail and the whole nail was extracted 

accidentally.  He just came up from [place].  He is not taking antibiotics at 

this time.  He has peripheral neuropathy with diabetes and therefore the pain 

is not severe. He reports problems with his vision but denies eye surgeries in 

the past.  There is been no trauma and no drainage from the toe and there is 

foul smell identified.  Not seeing a foot specialist at this time.Tetanus 

around 5 years ago. Patient is a insulin-dependent diabetic.  His sugars have 

been mostly high.  His wife helps him to maintain glucose control. Takes NPH 40 

units AM and 20 units PM.  He denies renal disease but states that his vision 

has been changing recently.  It is unclear whether he has had retinopathy or 

not. 





Cr on presentation noted to be 2.3- improved to 1.9 with hydration


Urine microalbumin/cr ration pending


Allergies:  


Coded Allergies:  


     No Known Allergies (Unverified , 6/2/16)





Medication History


Scheduled


Hydromorphone Hcl/Pf (Hydromorphone Hcl 1 Mg/Ml Amp), 1 MG IVP Q4HR, (Reported)


Losartan Potassium* (Losartan Potassium*), 100 MG ORAL DAILY, (Reported)


Metoprolol Tartrate* (Metoprolol Tartrate*), Unknown Dose ORAL EVERY 12 HOURS, (

Reported)


Omeprazole (Omeprazole), Unknown Dose ORAL DAILY, (Reported)


Saline (Sodium Chloride), 10 ML IVF Q8HR, (Reported)





Scheduled PRN


Acetaminophen (Acetaminophen), 650 MG RC QID PRN for Fever/Headache/Mild Pain, (

Reported)





Miscellaneous Medications


Insulin Glargine (Lantus), 30 SUBQ, (Reported)


Ondansetron* (Zofran*), 4 MG IVP, (Reported)





Patient History


Healthcare decision maker





Resuscitation status


Full Code


Advanced Directive on File








Review of Systems


Constitutional:  Denies: no symptoms, see HPI, chills, sweats, fever, malaise, 

weakness, other


Eye:  Denies: no symptoms, see HPI, eye pain, blurred vision, tearing, double 

vision, nose pain, nose congestion, acuity changes, discharge, other


ENT:  Denies: no symptoms, see HPI, ear pain, ear discharge, nose pain, nose 

congestion, throat pain, throat swelling, mouth pain, hearing loss, nasal 

discharge, other


Respiratory:  Denies: no symptoms, see HPI, cough, orthopnea, shortness of 

breath, stridor, wheezing, ROSS, sputum, other


Cardiovascular:  Denies: no symptoms, see HPI, chest pain, edema, palpitations, 

syncope, PND, other


Gastrointestinal:  Denies: no symptoms, see HPI, abdominal pain, constipation, 

diarrhea, nausea, vomiting, melena, hematemesis, other


Genitourinary:  Denies: no symptoms, see HPI, discharge, dysuria, frequency, 

hematuria, pain, retention, incontinence, urgency, vag bleed/dc, other


Musculoskeletal:  Denies: no symptoms, see HPI, back pain, gout, joint pain, 

joint swelling, muscle pain, muscle stiffness, other


Skin:  Denies: no symptoms, see HPI, rash, change in color, change in hair/nails

, dryness, lesions, other


Neurological:  Denies: no symptoms, see HPI, headache, numbness, paresthesia, 

seizure, tingling, tremors, focal weakness, syncope, dizziness, other





Physical Exam


General Appearance:  WD/WN, no apparent distress


HEENT:  normocephalic, atraumatic


Neck:  non-tender, supple


Respiratory/Chest:  chest wall non-tender, lungs clear


Cardiovascular/Chest:  no JVD


Abdomen:  normal bowel sounds, non tender


Extremities:  normal range of motion, non-tender, other - + gangrene of foot


Neurologic:  CNs II-XII grossly normal





Last 24 Hour Vital Signs








  Date Time  Temp Pulse Resp B/P (MAP) Pulse Ox O2 Delivery O2 Flow Rate FiO2


 


9/1/19 09:08    141/58    


 


9/1/19 09:00      Room Air  


 


9/1/19 08:00 97.3 83 18 141/58 (85) 98   


 


9/1/19 04:00 97.9 76 20 141/71 (94) 79   


 


9/1/19 00:00 97.8 77 18 129/71 (90) 99   


 


8/31/19 21:00      Room Air  


 


8/31/19 20:54  89  141/68    


 


8/31/19 20:00 97.9 89 20 142/64 (90) 99   


 


8/31/19 16:00 98.8 83 19 134/59 (84) 99   

















Intake and Output  


 


 8/31/19 9/1/19





 18:59 06:59


 


Intake Total 1875.000 ml 200 ml


 


Output Total  1000 ml


 


Balance 1875.000 ml -800 ml


 


  


 


IV Total 375.000 ml 200 ml


 


Other 1500 ml 


 


Output Urine Total  1000 ml


 


# Voids  2











Laboratory Tests








Test


  9/1/19


07:15


 


White Blood Count


  8.1 K/UL


(4.8-10.8)


 


Red Blood Count


  3.56 M/UL


(4.70-6.10)  L


 


Hemoglobin


  10.3 G/DL


(14.2-18.0)  L


 


Hematocrit


  31.2 %


(42.0-52.0)  L


 


Mean Corpuscular Volume 88 FL (80-99)  


 


Mean Corpuscular Hemoglobin


  28.9 PG


(27.0-31.0)


 


Mean Corpuscular Hemoglobin


Concent 33.0 G/DL


(32.0-36.0)


 


Red Cell Distribution Width


  11.1 %


(11.6-14.8)  L


 


Platelet Count


  263 K/UL


(150-450)


 


Mean Platelet Volume


  6.3 FL


(6.5-10.1)  L


 


Neutrophils (%) (Auto)


  62.0 %


(45.0-75.0)


 


Lymphocytes (%) (Auto)


  27.1 %


(20.0-45.0)


 


Monocytes (%) (Auto)


  7.2 %


(1.0-10.0)


 


Eosinophils (%) (Auto)


  2.9 %


(0.0-3.0)


 


Basophils (%) (Auto)


  0.8 %


(0.0-2.0)


 


Erythrocyte Sedimentation Rate


  64 MM/HR


(0-20)  H


 


Prothrombin Time


  10.3 SEC


(9.30-11.50)


 


Prothromb Time International


Ratio 1.0 (0.9-1.1)  


 


 


Activated Partial


Thromboplast Time 32 SEC (23-33)


 


 


Sodium Level


  142 MMOL/L


(136-145)


 


Potassium Level


  4.1 MMOL/L


(3.5-5.1)


 


Chloride Level


  111 MMOL/L


()  H


 


Carbon Dioxide Level


  22 MMOL/L


(21-32)


 


Anion Gap


  9 mmol/L


(5-15)


 


Blood Urea Nitrogen


  21 mg/dL


(7-18)  H


 


Creatinine


  1.9 MG/DL


(0.55-1.30)  H


 


Estimat Glomerular Filtration


Rate  mL/min (>60)  


 


 


Glucose Level


  148 MG/DL


()  H


 


Calcium Level


  8.8 MG/DL


(8.5-10.1)


 


Total Bilirubin


  0.4 MG/DL


(0.2-1.0)


 


Aspartate Amino Transf


(AST/SGOT) 15 U/L (15-37)


 


 


Alanine Aminotransferase


(ALT/SGPT) 14 U/L (12-78)


 


 


Alkaline Phosphatase


  57 U/L


()


 


C-Reactive Protein,


Quantitative 4.0 mg/dL


(0.00-0.90)  H


 


Total Protein


  6.7 G/DL


(6.4-8.2)


 


Albumin


  2.7 G/DL


(3.4-5.0)  L


 


Globulin 4.0 g/dL  


 


Albumin/Globulin Ratio


  0.7 (1.0-2.7)


L








Height (Feet):  5


Height (Inches):  6.00


Weight (Pounds):  160


Medications





Current Medications








 Medications


  (Trade)  Dose


 Ordered  Sig/Sherly


 Route


 PRN Reason  Start Time


 Stop Time Status Last Admin


Dose Admin


 


 Acetaminophen


  (Tylenol)  650 mg  Q4H  PRN


 ORAL


 Mild Pain (Pain Scale 1-3)  8/30/19 18:30


 9/29/19 18:29   


 


 


 Acetaminophen/


 Hydrocodone Bitart


  (Norco 5/325)  1 tab  QIDPRN  PRN


 ORAL


 pain  8/30/19 18:30


 9/6/19 18:29   


 


 


 Cefepime HCl 1 gm/


 Dextrose  55 ml @ 


 110 mls/hr  Q24H


 IVPB


   8/31/19 16:00


 9/7/19 15:59  8/31/19 16:12


 


 


 Dextrose


  (Dextrose 50%)  25 ml  Q30M  PRN


 IV


 Hypoglycemia  8/30/19 18:30


 9/29/19 18:29   


 


 


 Dextrose


  (Dextrose 50%)  50 ml  Q30M  PRN


 IV


 Hypoglycemia  8/30/19 18:30


 9/29/19 18:29   


 


 


 Enoxaparin Sodium


  (Lovenox)  30 mg  QHS


 SUBQ


   8/30/19 21:00


 9/29/19 20:59  8/31/19 20:59


 


 


 Insulin Human NPH


  (Humulin N)  20 units  BEFORE  DINNER


 SUBQ


   8/31/19 16:30


 9/30/19 16:29  8/31/19 17:47


 


 


 Insulin Human NPH


  (Humulin N)  40 units  BEFORE  BREAKFAST


 SUBQ


   8/31/19 06:30


 9/30/19 06:29  9/1/19 06:33


 


 


 Losartan Potassium


  (Cozaar)  100 mg  DAILY


 ORAL


   8/31/19 09:00


 9/30/19 08:59  9/1/19 09:08


 


 


 Metoprolol


 Tartrate


  (Lopressor)  100 mg  BEDTIME


 ORAL


   8/31/19 21:00


 9/30/19 20:59  8/31/19 20:54


 


 


 Morphine Sulfate


  (Morphine


 Sulfate)  1 mg  Q2H  PRN


 IVP


 For Pain  8/30/19 18:30


 9/6/19 18:29   


 


 


 Pantoprazole


  (Protonix)  40 mg  DAILY


 ORAL


   8/31/19 09:00


 9/30/19 08:59  9/1/19 09:07


 


 


 Polyethylene


 Glycol


  (Miralax)  17 gm  HSPRN  PRN


 ORAL


 Constipation  8/30/19 18:30


 9/29/19 18:29   


 


 


 Vancomycin HCl


  (Vanco rx to


 dose)  1 ea  DAILY  PRN


 MISC


 Per rx protocol  8/30/19 18:30


 9/29/19 18:29   


 











Assessment/Plan


Problem List:  


(1) Hyperglycemia


ICD Codes:  R73.9 - Hyperglycemia, unspecified


SNOMED:  29493880


(2) Elevated lactic acid level


ICD Codes:  R79.89 - Other specified abnormal findings of blood chemistry


SNOMED:  1932818


(3) Gangrene of toe of left foot


ICD Codes:  I96 - Gangrene, not elsewhere classified


SNOMED:  86298553479010815


(4) Renal failure


ICD Codes:  N19 - Unspecified kidney failure


SNOMED:  31401586


Qualifiers:  


   Qualified Codes:  N19 - Unspecified kidney failure


(5) Dehydration


ICD Codes:  E86.0 - Dehydration


SNOMED:  36331308


(6) HTN (hypertension)


ICD Codes:  I10 - Essential (primary) hypertension


SNOMED:  15986441


(7) DM (diabetes mellitus)


ICD Codes:  E11.9 - Type 2 diabetes mellitus without complications


SNOMED:  61083063


Diagnosis


Axis I:


# Acute kidney injury on possibley CKD stage III- likely diabetic nephropathy


# L second toe distal dry gangrene


# IDDM


# HTN


# Diabetic foot ulcer





- hold losartan for now


- continue to monitor off IVF- resume when/if NPO


- continue metop 100 at night


- add amlodipine 5mg daily 


- check renal US


- cefepime and Vancomycin- renal dose 


- avoid supratherapeutic vanco level


- podiatry/surgery Kyler Villareal M.D. Sep 1, 2019 13:36

## 2019-09-01 NOTE — NUR
NURSE NOTES:

Received patient in bed, awake, alert and oriented x4. Not in respiratory/cardiac distress. 
No s/s of hypo/hyperglycemia. Denies pain or discomfort.Dressing on left foot intact. Bed is 
in lowest position and locked. Call light within reach. Will continue plan of care.

## 2019-09-01 NOTE — SURGERY PROGRESS NOTE
Surgery Progress Note


Subjective


Additional Comments


Patient seen and examined at bedside.  No acute events.  Labs improving.  

Overall improving on IV antibiotics.  Pending MRI





Objective





Last 24 Hour Vital Signs








  Date Time  Temp Pulse Resp B/P (MAP) Pulse Ox O2 Delivery O2 Flow Rate FiO2


 


9/1/19 09:08    141/58    


 


9/1/19 09:00      Room Air  


 


9/1/19 08:00 97.3 83 18 141/58 (85) 98   


 


9/1/19 04:00 97.9 76 20 141/71 (94) 79   


 


9/1/19 00:00 97.8 77 18 129/71 (90) 99   


 


8/31/19 21:00      Room Air  


 


8/31/19 20:54  89  141/68    


 


8/31/19 20:00 97.9 89 20 142/64 (90) 99   


 


8/31/19 16:00 98.8 83 19 134/59 (84) 99   


 


8/31/19 12:00 98.0 80 17 110/50 (70) 99   








I&O











Intake and Output  


 


 8/31/19 9/1/19





 18:59 06:59


 


Intake Total 1875.000 ml 200 ml


 


Output Total  1000 ml


 


Balance 1875.000 ml -800 ml


 


  


 


IV Total 375.000 ml 200 ml


 


Other 1500 ml 


 


Output Urine Total  1000 ml


 


# Voids  2








Dressing:  dry


Wound:  clean


Cardiovascular:  RSR


Respiratory:  clear


Abdomen:  soft, flat, non-tender, present bowel sounds


Extremities:  edema, cyanosis, no tenderness, pulses, other





Laboratory Tests








Test


  9/1/19


07:15


 


White Blood Count


  8.1 K/UL


(4.8-10.8)


 


Red Blood Count


  3.56 M/UL


(4.70-6.10)  L


 


Hemoglobin


  10.3 G/DL


(14.2-18.0)  L


 


Hematocrit


  31.2 %


(42.0-52.0)  L


 


Mean Corpuscular Volume 88 FL (80-99)  


 


Mean Corpuscular Hemoglobin


  28.9 PG


(27.0-31.0)


 


Mean Corpuscular Hemoglobin


Concent 33.0 G/DL


(32.0-36.0)


 


Red Cell Distribution Width


  11.1 %


(11.6-14.8)  L


 


Platelet Count


  263 K/UL


(150-450)


 


Mean Platelet Volume


  6.3 FL


(6.5-10.1)  L


 


Neutrophils (%) (Auto)


  62.0 %


(45.0-75.0)


 


Lymphocytes (%) (Auto)


  27.1 %


(20.0-45.0)


 


Monocytes (%) (Auto)


  7.2 %


(1.0-10.0)


 


Eosinophils (%) (Auto)


  2.9 %


(0.0-3.0)


 


Basophils (%) (Auto)


  0.8 %


(0.0-2.0)


 


Erythrocyte Sedimentation Rate


  64 MM/HR


(0-20)  H


 


Prothrombin Time


  10.3 SEC


(9.30-11.50)


 


Prothromb Time International


Ratio 1.0 (0.9-1.1)  


 


 


Activated Partial


Thromboplast Time 32 SEC (23-33)


 


 


Sodium Level


  142 MMOL/L


(136-145)


 


Potassium Level


  4.1 MMOL/L


(3.5-5.1)


 


Chloride Level


  111 MMOL/L


()  H


 


Carbon Dioxide Level


  22 MMOL/L


(21-32)


 


Anion Gap


  9 mmol/L


(5-15)


 


Blood Urea Nitrogen


  21 mg/dL


(7-18)  H


 


Creatinine


  1.9 MG/DL


(0.55-1.30)  H


 


Estimat Glomerular Filtration


Rate  mL/min (>60)  


 


 


Glucose Level


  148 MG/DL


()  H


 


Calcium Level


  8.8 MG/DL


(8.5-10.1)


 


Total Bilirubin


  0.4 MG/DL


(0.2-1.0)


 


Aspartate Amino Transf


(AST/SGOT) 15 U/L (15-37)


 


 


Alanine Aminotransferase


(ALT/SGPT) 14 U/L (12-78)


 


 


Alkaline Phosphatase


  57 U/L


()


 


C-Reactive Protein,


Quantitative 4.0 mg/dL


(0.00-0.90)  H


 


Total Protein


  6.7 G/DL


(6.4-8.2)


 


Albumin


  2.7 G/DL


(3.4-5.0)  L


 


Globulin 4.0 g/dL  


 


Albumin/Globulin Ratio


  0.7 (1.0-2.7)


L











Plan


Problems:  


(1) Elevated lactic acid level


Assessment & Plan:  Patient presented with lactic acidosis, cellulitis and 

infection of his left foot with necrosis and gangrene of the left second toe.


Continue with his resuscitation


Okay for diet





(2) Gangrene of toe of left foot


Assessment & Plan:  Patient presents with left second ray gangrene dry foul-

smelling no purulent drainage edema and cellulitis of the foot.  Has had it for 

a few weeks and worsening since his trip to [place].


Plain films noted no fracture


Pending MRI of the left foot which can be performed when available 


IV antibiotics as per infectious disease


Pending venous and arterial studies as well





Swab necrotic area of left foot with Betadine wash foot daily apply gauze 

dressing


We will follow with recommendations as results of imaging are available


We will attempt to salvage as much possible but potentially may need an 

amputation


Strict glucose monitoring and control


Thank you will follow with recommendations





(3) DM (diabetes mellitus)











Raciel Meléndez Sep 1, 2019 11:33

## 2019-09-02 VITALS — SYSTOLIC BLOOD PRESSURE: 134 MMHG | DIASTOLIC BLOOD PRESSURE: 64 MMHG

## 2019-09-02 VITALS — DIASTOLIC BLOOD PRESSURE: 67 MMHG | SYSTOLIC BLOOD PRESSURE: 130 MMHG

## 2019-09-02 VITALS — DIASTOLIC BLOOD PRESSURE: 71 MMHG | SYSTOLIC BLOOD PRESSURE: 145 MMHG

## 2019-09-02 VITALS — SYSTOLIC BLOOD PRESSURE: 126 MMHG | DIASTOLIC BLOOD PRESSURE: 71 MMHG

## 2019-09-02 VITALS — DIASTOLIC BLOOD PRESSURE: 73 MMHG | SYSTOLIC BLOOD PRESSURE: 142 MMHG

## 2019-09-02 VITALS — SYSTOLIC BLOOD PRESSURE: 128 MMHG | DIASTOLIC BLOOD PRESSURE: 73 MMHG

## 2019-09-02 RX ADMIN — HUMAN INSULIN SCH UNITS: 100 INJECTION, SUSPENSION SUBCUTANEOUS at 06:37

## 2019-09-02 RX ADMIN — ENOXAPARIN SODIUM SCH MG: 30 INJECTION SUBCUTANEOUS at 20:39

## 2019-09-02 RX ADMIN — SODIUM CHLORIDE SCH MLS/HR: 0.9 INJECTION INTRAVENOUS at 16:57

## 2019-09-02 RX ADMIN — HUMAN INSULIN SCH UNITS: 100 INJECTION, SUSPENSION SUBCUTANEOUS at 17:00

## 2019-09-02 NOTE — DIAGNOSTIC IMAGING REPORT
EXAM:

  US Retroperitoneal Limited, Renal

 

CLINICAL HISTORY:

  RENAL-A

 

TECHNIQUE:

  Real-time ultrasound of the retroperitoneum (limited) with image 

documentation.

 

COMPARISON:

  No relevant prior studies available.

 

FINDINGS:

  Right kidney:  Right kidney measures 10.2 x 5.9 cm.  No hydronephrosis. 

 No perinephric collection.

  Left kidney:  Left kidney measures 10.5 x 5.0 cm.  No hydronephrosis or 

perinephric collection.

  Bladder:  Nodular lesion arising from the prostate or bladder base, 

measures 1.8 x 1.2 cm..  Bladder volume 162 mL

  Other findings:  Heterogeneous nodular prostate measuring 4.6 x 4.2 x 4.

3 cm.

 

IMPRESSION:     

1.  No hydronephrosis.

2.  Nodular lesion arising from the prostate or bladder base, measures 1.

8 x 1.2 cm.

## 2019-09-02 NOTE — GENERAL PROGRESS NOTE
Assessment/Plan


Problem List:  


(1) Hyperglycemia


ICD Codes:  R73.9 - Hyperglycemia, unspecified


SNOMED:  26937252


(2) Renal failure


ICD Codes:  N19 - Unspecified kidney failure


SNOMED:  72689542


Qualifiers:  


   Qualified Codes:  N19 - Unspecified kidney failure


(3) Elevated lactic acid level


ICD Codes:  R79.89 - Other specified abnormal findings of blood chemistry


SNOMED:  4107230


(4) Gangrene of toe of left foot


ICD Codes:  I96 - Gangrene, not elsewhere classified


SNOMED:  90458423602743778


(5) DM (diabetes mellitus)


ICD Codes:  E11.9 - Type 2 diabetes mellitus without complications


SNOMED:  27110404


Status:  stable


Assessment/Plan:


72 y/o male admitted with L second toe foul smelling necrosis.





# L second toe distal dry gangrene


# Diabetic foot ulcer


Cefepime and Vancomycin- renal dose 


ESR and CRP added


Possible need for vascular and podiatry consult


MRI foot pending 


Surgical consult appreciated 


Lactic acid trending down 








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 


Patient denies prior renal disease and nephrology was consulted. Dr. Torres


IVF and monitor BMP


UA with microalbumin


Renal US 


Hold Losartan 





# IDDM- poorly controlled- HbA1c 10.4


finger stick glucose has been less than 200 on NPH 40 AM, 20 PM 


diabetic diet 





# Disposition


PT evaluation for possible need of assistive devices.





FULL CODE


DVT and GI ppx





Subjective


Date patient seen:  Sep 2, 2019


Constitutional:  Denies: no symptoms, chills, diaphoresis, fever, malaise, 

weakness, other


HEENT:  Denies: no symptoms, eye pain, blurred vision, tearing, double vision, 

ear pain, ear discharge, nose pain, nose congestion, throat pain, throat 

swelling, mouth pain, mouth swelling, other


Cardiovascular:  Denies: no symptoms, chest pain, edema, irregular heart rate, 

lightheadedness, palpitations, syncope, other


Respiratory:  Denies: no symptoms, cough, orthopnea, shortness of breath, SOB 

with excertion, SOB at rest, sputum, stridor, wheezing, other


Gastrointestinal/Abdominal:  Denies: no symptoms, abdomen distended, abdominal 

pain, black stools, tarry stools, blood in stool, constipated, diarrhea, 

difficulty swallowing, nausea, poor appetite, poor fluid intake, rectal bleeding

, vomiting, other


Genitourinary:  Denies: no symptoms, burning, discharge, frequency, flank pain, 

hematuria, incontinence, pain, urgency, other


Neurologic/Psychiatric:  Denies: no symptoms, anxiety, depressed, emotional 

problems, headache, numbness, paresthesia, pre-existing deficit, seizure, 

tingling, tremors, weakness, other


Endocrine:  Denies: no symptoms, excessive sweating, flushing, intolerance to 

cold, intolerance to heat, increased hunger, increased thirst, increased urine, 

unexplained weight gain, unexplained weight loss, other


Hematologic/Lymphatic:  Denies: no symptoms, anemia, easy bleeding, easy 

bruising, other


Allergies:  


Coded Allergies:  


     No Known Allergies (Unverified , 6/2/16)


Subjective


Seen and examined. 


he is resting comfortably





Objective





Last 24 Hour Vital Signs








  Date Time  Temp Pulse Resp B/P (MAP) Pulse Ox O2 Delivery O2 Flow Rate FiO2


 


9/2/19 08:44  81  128/73    


 


9/2/19 08:00 98.1 81 18 128/73 (91) 98   


 


9/2/19 04:00 97.9 71 18 134/64 (87) 100   


 


9/2/19 00:00 97.8 84 17 145/71 (95) 98   


 


9/1/19 21:32  88  151/67    


 


9/1/19 21:00      Room Air  


 


9/1/19 20:00 98.0 88 18 151/67 (95) 98   


 


9/1/19 16:00 99.1 85 19 116/69 (85) 98   


 


9/1/19 12:00 98.7 83 18 140/71 (94) 98   

















Intake and Output  


 


 9/1/19 9/2/19





 19:00 07:00


 


Intake Total 755 ml 


 


Balance 755 ml 


 


  


 


Intake Oral 700 ml 


 


IV Total 55 ml 


 


# Voids 4 3


 


# Bowel Movements 1 








Laboratory Tests


9/2/19 06:20: Random Vancomycin Level 5.5


Height (Feet):  5


Height (Inches):  6.00


Weight (Pounds):  160


Objective


General appearance:  alert, cooperative, no distress, appears stated age


Head:  Normocephalic, without obvious abnormality, atraumatic


Eyes:  conjunctivae/corneas clear. PERRL, EOM's intact. Fundi benign


Throat:  Lips, mucosa, and tongue normal. Teeth and gums normal


Neck:  supple, symmetrical, trachea midline, no adenopathy, thyroid: not 

enlarged, symmetric, no tenderness/mass/nodules, no carotid bruit and no JVD


Lungs:  clear to auscultation bilaterally


Heart:  regular rate and rhythm, S1, S2 normal, no murmur, click, rub or gallop


Abdomen:  soft, non-tender. Bowel sounds normal. No masses,  no organomegaly


Extremities:  extremities normal, atraumatic, necrotic left second toe. foul 

odor. no drainage 


Pulses:  2+ and symmetric


Skin:  Skin color, texture, turgor normal. No rashes or lesions


Neurologic:  Grossly normal











Guillermo Tompkins M.D. Sep 2, 2019 09:11

## 2019-09-02 NOTE — SURGERY PROGRESS NOTE
Surgery Progress Note


Subjective


Additional Comments


no acute events


comfortable


stable


pending MRI


US noted





Objective





Last 24 Hour Vital Signs








  Date Time  Temp Pulse Resp B/P (MAP) Pulse Ox O2 Delivery O2 Flow Rate FiO2


 


9/2/19 12:00 97.3 83 18 130/67 (88) 97   


 


9/2/19 09:00      Room Air  


 


9/2/19 08:44  81  128/73    


 


9/2/19 08:00 98.1 81 18 128/73 (91) 98   


 


9/2/19 04:00 97.9 71 18 134/64 (87) 100   


 


9/2/19 00:00 97.8 84 17 145/71 (95) 98   


 


9/1/19 21:32  88  151/67    


 


9/1/19 21:00      Room Air  


 


9/1/19 20:00 98.0 88 18 151/67 (95) 98   


 


9/1/19 16:00 99.1 85 19 116/69 (85) 98   








I&O











Intake and Output  


 


 9/1/19 9/2/19





 19:00 07:00


 


Intake Total 755 ml 


 


Balance 755 ml 


 


  


 


Intake Oral 700 ml 


 


IV Total 55 ml 


 


# Voids 4 3


 


# Bowel Movements 1 








Dressing:  other


Wound:  other


Cardiovascular:  RSR


Respiratory:  clear


Abdomen:  soft, non-tender, present bowel sounds, non-distended


Extremities:  cyanosis, other





Laboratory Tests








Test


  9/2/19


06:20


 


Random Vancomycin Level 5.5 ug/mL  











Plan


Problems:  


(1) Elevated lactic acid level


Assessment & Plan:  Patient presented with lactic acidosis, cellulitis and 

infection of his left foot with necrosis and gangrene of the left second toe.


Continue with his resuscitation


Okay for diet





(2) Gangrene of toe of left foot


Assessment & Plan:  Patient presents with left second ray gangrene dry foul-

smelling no purulent drainage edema and cellulitis of the foot.  Has had it for 

a few weeks and worsening since his trip to [place].


Plain films noted no fracture


Pending MRI of the left foot which can be performed when available 


IV antibiotics as per infectious disease


Pending venous and arterial studies as well





Swab necrotic area of left foot with Betadine wash foot daily apply gauze 

dressing


We will follow with recommendations as results of imaging are available


We will attempt to salvage as much possible but potentially may need an 

amputation


Strict glucose monitoring and control


Thank you will follow with recommendations





(3) DM (diabetes mellitus)











Raciel Meléndez Sep 2, 2019 13:47

## 2019-09-02 NOTE — NUR
NURSE NOTES:



REPORT RECEIVED FROM BRANDON BRANNON. PATIENT RECEIVED AWAKE AND ALERT AND ABLE TO MAKE NEEDS 
KNOWN. PATIENT HAS CALL LIGHT WITHIN REACH, IV SITE IS CLEAN DRY AND INTACT. BED IN THE LOW 
AND LOCKED POSITION. NO PHYSICAL SIGNS OF DISTRESS. WILL CONTINUE TO MONITOR PATIENT.

## 2019-09-02 NOTE — NUR
NURSE NOTES:

Received report form BRANDON Bond. Patient is in bed, awake and alert x4. On room air with no 
signs of distress or SOB. No c/o pain at this time. IV is intact and patent. Bed locked and 
in lowest position. Call light in reach. Will continue to monitor.

## 2019-09-03 VITALS — SYSTOLIC BLOOD PRESSURE: 120 MMHG | DIASTOLIC BLOOD PRESSURE: 44 MMHG

## 2019-09-03 VITALS — DIASTOLIC BLOOD PRESSURE: 72 MMHG | SYSTOLIC BLOOD PRESSURE: 137 MMHG

## 2019-09-03 VITALS — SYSTOLIC BLOOD PRESSURE: 132 MMHG | DIASTOLIC BLOOD PRESSURE: 68 MMHG

## 2019-09-03 VITALS — SYSTOLIC BLOOD PRESSURE: 130 MMHG | DIASTOLIC BLOOD PRESSURE: 66 MMHG

## 2019-09-03 VITALS — DIASTOLIC BLOOD PRESSURE: 59 MMHG | SYSTOLIC BLOOD PRESSURE: 135 MMHG

## 2019-09-03 VITALS — SYSTOLIC BLOOD PRESSURE: 134 MMHG | DIASTOLIC BLOOD PRESSURE: 65 MMHG

## 2019-09-03 VITALS — SYSTOLIC BLOOD PRESSURE: 133 MMHG | DIASTOLIC BLOOD PRESSURE: 68 MMHG

## 2019-09-03 LAB
ADD MANUAL DIFF: NO
ALBUMIN SERPL-MCNC: 2.6 G/DL (ref 3.4–5)
ALBUMIN/GLOB SERPL: 0.6 {RATIO} (ref 1–2.7)
ALP SERPL-CCNC: 74 U/L (ref 46–116)
ALT SERPL-CCNC: 45 U/L (ref 12–78)
ANION GAP SERPL CALC-SCNC: 12 MMOL/L (ref 5–15)
AST SERPL-CCNC: 67 U/L (ref 15–37)
BASOPHILS NFR BLD AUTO: 0.9 % (ref 0–2)
BILIRUB SERPL-MCNC: 0.3 MG/DL (ref 0.2–1)
BUN SERPL-MCNC: 18 MG/DL (ref 7–18)
CALCIUM SERPL-MCNC: 8.9 MG/DL (ref 8.5–10.1)
CHLORIDE SERPL-SCNC: 109 MMOL/L (ref 98–107)
CO2 SERPL-SCNC: 21 MMOL/L (ref 21–32)
CREAT SERPL-MCNC: 1.6 MG/DL (ref 0.55–1.3)
EOSINOPHIL NFR BLD AUTO: 1.8 % (ref 0–3)
ERYTHROCYTE [DISTWIDTH] IN BLOOD BY AUTOMATED COUNT: 10.9 % (ref 11.6–14.8)
GLOBULIN SER-MCNC: 4.1 G/DL
HCT VFR BLD CALC: 31.7 % (ref 42–52)
HGB BLD-MCNC: 10.3 G/DL (ref 14.2–18)
LYMPHOCYTES NFR BLD AUTO: 23 % (ref 20–45)
MCV RBC AUTO: 87 FL (ref 80–99)
MONOCYTES NFR BLD AUTO: 8.7 % (ref 1–10)
NEUTROPHILS NFR BLD AUTO: 65.6 % (ref 45–75)
PLATELET # BLD: 269 K/UL (ref 150–450)
POTASSIUM SERPL-SCNC: 3.9 MMOL/L (ref 3.5–5.1)
RBC # BLD AUTO: 3.64 M/UL (ref 4.7–6.1)
SODIUM SERPL-SCNC: 142 MMOL/L (ref 136–145)
WBC # BLD AUTO: 8.4 K/UL (ref 4.8–10.8)

## 2019-09-03 RX ADMIN — HUMAN INSULIN SCH UNITS: 100 INJECTION, SUSPENSION SUBCUTANEOUS at 16:39

## 2019-09-03 RX ADMIN — CHLORHEXIDINE GLUCONATE SCH APPLIC: 213 SOLUTION TOPICAL at 20:50

## 2019-09-03 RX ADMIN — ENOXAPARIN SODIUM SCH MG: 30 INJECTION SUBCUTANEOUS at 20:55

## 2019-09-03 RX ADMIN — HUMAN INSULIN SCH UNITS: 100 INJECTION, SUSPENSION SUBCUTANEOUS at 06:43

## 2019-09-03 RX ADMIN — SODIUM CHLORIDE SCH MLS/HR: 0.9 INJECTION INTRAVENOUS at 16:01

## 2019-09-03 NOTE — NEPHROLOGY PROGRESS NOTE
Assessment/Plan


Problem List:  


(1) Hyperglycemia


(2) Elevated lactic acid level


(3) Gangrene of toe of left foot


(4) Renal failure


(5) Dehydration


(6) HTN (hypertension)


(7) DM (diabetes mellitus)


Plan


# Acute kidney injury on possibley CKD stage III- likely diabetic nephropathy


# L second toe distal dry gangrene


# IDDM


# HTN


# Diabetic foot ulcer





- hold losartan for now


- continue to monitor off IVF- resume when/if NPO


- continue metop 100 at night


- continue amlodipine 5mg daily 


- check renal US--> 





IMPRESSION:     


1.  No hydronephrosis.


2.  Nodular lesion arising from the prostate or bladder base, measures 1.


8 x 1.2 cm





will obtain mri pelvic to better delineate lesion 














- cefepime and Vancomycin- renal dose 


- avoid supratherapeutic vanco level


- podiatry/surgery eval





Subjective


Interval Events/Complaints


Cr improved to 1.6


BP stable


Constitutional:  Denies: no symptoms, chills, diaphoresis, fever, malaise, 

weakness, other


HEENT:  Denies: no symptoms, eye pain, blurred vision, tearing, double vision, 

ear pain, ear discharge, nose pain, nose congestion, throat pain, throat 

swelling, mouth pain, mouth swelling, other


Genitourinary:  Denies: no symptoms, burning, discharge, frequency, flank pain, 

hematuria, incontinence, pain, urgency, other


Neurologic/Psychiatric:  Denies: no symptoms, anxiety, depressed, emotional 

problems, headache, numbness, paresthesia, pre-existing deficit, seizure, 

tingling, tremors, weakness, other


Subjective


no significant pain


No fevers or chills. 


mri foot pending





Objective


Objective





Last 24 Hour Vital Signs








  Date Time  Temp Pulse Resp B/P (MAP) Pulse Ox O2 Delivery O2 Flow Rate FiO2


 


9/3/19 12:00 97.9 82 16 120/44 (69) 94   


 


9/3/19 09:45  78  134/65    


 


9/3/19 09:00      Room Air  


 


9/3/19 08:00 98.1 78 18 134/65 (88) 99   


 


9/3/19 04:00 98.0 72 19 137/72 (93) 98   


 


9/3/19 00:00 98.0 93 20 132/68 (89) 97   


 


9/2/19 21:00      Room Air  


 


9/2/19 20:34  86  126/71    


 


9/2/19 20:00 97.3 86 18 126/71 (89) 98   


 


9/2/19 16:00 97.7 85 18 142/73 (96) 97   

















Intake and Output  


 


 9/2/19 9/3/19





 19:00 07:00


 


Intake Total 800 ml 240 ml


 


Output Total 560 ml 


 


Balance 240 ml 240 ml


 


  


 


Intake Oral 800 ml 240 ml


 


Output Urine Total 560 ml 


 


# Voids 3 2








Laboratory Tests


9/3/19 05:19: 


Urine Random Creatinine [Pending], Urine Random Microalbumin [Pending], Urine 

Microalbumin/Creatinine Ratio [Pending]


9/3/19 06:52: 


White Blood Count 8.4, Red Blood Count 3.64L, Hemoglobin 10.3L, Hematocrit 31.7L

, Mean Corpuscular Volume 87, Mean Corpuscular Hemoglobin 28.4, Mean 

Corpuscular Hemoglobin Concent 32.6, Red Cell Distribution Width 10.9L, 

Platelet Count 269, Mean Platelet Volume 6.4L, Neutrophils (%) (Auto) 65.6, 

Lymphocytes (%) (Auto) 23.0, Monocytes (%) (Auto) 8.7, Eosinophils (%) (Auto) 

1.8, Basophils (%) (Auto) 0.9, Sodium Level 142, Potassium Level 3.9, Chloride 

Level 109H, Carbon Dioxide Level 21, Anion Gap 12, Blood Urea Nitrogen 18, 

Creatinine 1.6H, Estimat Glomerular Filtration Rate , Glucose Level 153H, 

Calcium Level 8.9, Total Bilirubin 0.3, Aspartate Amino Transf (AST/SGOT) 67H, 

Alanine Aminotransferase (ALT/SGPT) 45, Alkaline Phosphatase 74, Total Protein 

6.7, Albumin 2.6L, Globulin 4.1, Albumin/Globulin Ratio 0.6L


Height (Feet):  5


Height (Inches):  6.00


Weight (Pounds):  160











Kyler Torres M.D. Sep 3, 2019 15:38

## 2019-09-03 NOTE — SURGERY PROGRESS NOTE
Surgery Progress Note


Subjective


Additional Comments


labs okay


exam okay


MRI noted


no acute events





Objective





Last 24 Hour Vital Signs








  Date Time  Temp Pulse Resp B/P (MAP) Pulse Ox O2 Delivery O2 Flow Rate FiO2


 


9/3/19 16:00 98.3 90 17 135/59 (84) 99   


 


9/3/19 12:00 97.9 82 16 120/44 (69) 94   


 


9/3/19 09:45  78  134/65    


 


9/3/19 09:00      Room Air  


 


9/3/19 08:00 98.1 78 18 134/65 (88) 99   


 


9/3/19 04:00 98.0 72 19 137/72 (93) 98   


 


9/3/19 00:00 98.0 93 20 132/68 (89) 97   


 


9/2/19 21:00      Room Air  


 


9/2/19 20:34  86  126/71    


 


9/2/19 20:00 97.3 86 18 126/71 (89) 98   








I&O











Intake and Output  


 


 9/2/19 9/3/19





 19:00 07:00


 


Intake Total 800 ml 240 ml


 


Output Total 560 ml 


 


Balance 240 ml 240 ml


 


  


 


Intake Oral 800 ml 240 ml


 


Output Urine Total 560 ml 


 


# Voids 3 2








Dressing:  dry


Wound:  clean


Cardiovascular:  RSR


Respiratory:  clear


Abdomen:  soft, non-tender, present bowel sounds, non-distended


Extremities:  no tenderness, no cyanosis, other





Laboratory Tests








Test


  9/3/19


05:19 9/3/19


06:52


 


Urine Random Creatinine Pending   


 


Urine Random Microalbumin Pending   


 


Urine Microalbumin/Creatinine


Ratio Pending  


  


 


 


White Blood Count


  


  8.4 K/UL


(4.8-10.8)


 


Red Blood Count


  


  3.64 M/UL


(4.70-6.10)  L


 


Hemoglobin


  


  10.3 G/DL


(14.2-18.0)  L


 


Hematocrit


  


  31.7 %


(42.0-52.0)  L


 


Mean Corpuscular Volume  87 FL (80-99)  


 


Mean Corpuscular Hemoglobin


  


  28.4 PG


(27.0-31.0)


 


Mean Corpuscular Hemoglobin


Concent 


  32.6 G/DL


(32.0-36.0)


 


Red Cell Distribution Width


  


  10.9 %


(11.6-14.8)  L


 


Platelet Count


  


  269 K/UL


(150-450)


 


Mean Platelet Volume


  


  6.4 FL


(6.5-10.1)  L


 


Neutrophils (%) (Auto)


  


  65.6 %


(45.0-75.0)


 


Lymphocytes (%) (Auto)


  


  23.0 %


(20.0-45.0)


 


Monocytes (%) (Auto)


  


  8.7 %


(1.0-10.0)


 


Eosinophils (%) (Auto)


  


  1.8 %


(0.0-3.0)


 


Basophils (%) (Auto)


  


  0.9 %


(0.0-2.0)


 


Sodium Level


  


  142 MMOL/L


(136-145)


 


Potassium Level


  


  3.9 MMOL/L


(3.5-5.1)


 


Chloride Level


  


  109 MMOL/L


()  H


 


Carbon Dioxide Level


  


  21 MMOL/L


(21-32)


 


Anion Gap


  


  12 mmol/L


(5-15)


 


Blood Urea Nitrogen


  


  18 mg/dL


(7-18)


 


Creatinine


  


  1.6 MG/DL


(0.55-1.30)  H


 


Estimat Glomerular Filtration


Rate 


   mL/min (>60)  


 


 


Glucose Level


  


  153 MG/DL


()  H


 


Calcium Level


  


  8.9 MG/DL


(8.5-10.1)


 


Total Bilirubin


  


  0.3 MG/DL


(0.2-1.0)


 


Aspartate Amino Transf


(AST/SGOT) 


  67 U/L (15-37)


H


 


Alanine Aminotransferase


(ALT/SGPT) 


  45 U/L (12-78)


 


 


Alkaline Phosphatase


  


  74 U/L


()


 


Total Protein


  


  6.7 G/DL


(6.4-8.2)


 


Albumin


  


  2.6 G/DL


(3.4-5.0)  L


 


Globulin  4.1 g/dL  


 


Albumin/Globulin Ratio


  


  0.6 (1.0-2.7)


L











Plan


Problems:  


(1) Elevated lactic acid level


Assessment & Plan:  Patient presented with lactic acidosis, cellulitis and 

infection of his left foot with necrosis and gangrene of the left second toe.


Continue with his resuscitation


Okay for diet





(2) Gangrene of toe of left foot


Assessment & Plan:  Patient presents with left second ray gangrene dry foul-

smelling no purulent drainage edema and cellulitis of the foot.  Has had it for 

a few weeks and worsening since his trip to [place].


Plain films noted no fracture


IV antibiotics as per infectious disease


Pending venous and arterial studies as well





MRI: Impression: Abnormal STIR and T1 signal within the second distal phalanx, 

indicative


of osteomyelitis. Subtle increased STIR signal within the second middle phalanx


without corresponding T1 signal abnormality. This may reflect reactive changes 

or


very early osteomyelitis Dorsal edema. This may indicate cellulitis or may be 

vasogenic in origin





Swab necrotic area of left foot with Betadine wash foot daily apply gauze 

dressing


We will follow with recommendations as results of imaging are available


We will attempt to salvage as much possible but potentially may need an 

amputation


cont with IV abx and local wound care.  defer to ID for abx duration


Strict glucose monitoring and control


Thank you will follow with recommendations





(3) DM (diabetes mellitus)











Raciel Meléndez Sep 3, 2019 18:39

## 2019-09-03 NOTE — NUR
NURSE NOTES:



HANDOFF RECEIVED FROM JESE TAYLOR. PATIENT OBSERVED SITTING IN THE BEDSIDE CHAIR. PATIENT IS 
ALERT AND ABLE TO MAKE NEEDS KNOWN. IV SITE IS CLEAN, DRY AND INTACT, SALINE LOCKED. BED IS 
IN THE LOW AND LOCKED POSITION WITH CALL LIGHT AT [facility].

## 2019-09-03 NOTE — NUR
NURSE NOTES:



RADIOLOGY DEPARTMENT CALLED TO SEE IF THERE WAS CONSENT FOR PICC LINE IN THE CHART, INFORMED 
THEM THAT THERE WAS NO CONSENT IN THE CHART. STATED THAT I WOULD NOT BE ABLE TO OBTAIN 
CONSENT.

## 2019-09-03 NOTE — NUR
CASE MANAGEMENT: INITIAL REVIEW 



74 YO M PRESENTED TO ED FROM HOME 



CC: DISCOLORATION OF LEFT TOE 



PMHx: COPD. DM. HTN. 



SI:GANGRENE LEFT SECOND GREAT TOE. DM. 

T 98.2  RR 18 B/P 163/69 SATS 98% ON RA 

BUN 39 CR 2.9  AST 14 BNP 1074



IS: CEFEPIME IV X1

NS BOLUS X3 

VANCO IV X1 

RENAL US 

IMPRESSION:     

1.  No hydronephrosis.

 2.  Nodular lesion arising from the prostate or bladder base, measures 1.8 x 1.2 cm.



***PATIENT ADMITTED TO MED/SURG 8/30/2019 @ 1726****



DCP: PATIENT TO BE DISCHARGED TO HOME ONCE MEDICALLY CLEARED. 



PLAN OF CARE: 

SURGICAL CONSULT 

MRI FOOT 

Impression: Abnormal STIR and T1 signal within the second distal phalanx, indicative

of osteomyelitis. Subtle increased STIR signal within the second middle phalanx

without corresponding T1 signal abnormality. This may reflect reactive changes or

very early osteomyelitis Dorsal edema. This may indicate cellulitis or may be vasogenic in 
origin

-------------------------------------------------------------------------------

Addendum: 09/04/19 at 0737 by Vianney Saravia CM

-------------------------------------------------------------------------------

INTERQUAL MET

## 2019-09-03 NOTE — NUR
NURSE NOTES:



PATIENT COMPLAINED OF DIARRHEA AND STOMACH PAIN. CONTACTED  AND GOT ORDER FO C-DIFF STOOL 
CULTURE.

## 2019-09-03 NOTE — CARDIOLOGY REPORT
--------------- APPROVED REPORT --------------





EKG Measurement

Heart [id number]

WV [id number]

[id number]

[id number]

[id number]





Sinus tachycardia

Low voltage QRS

Borderline ECG

## 2019-09-03 NOTE — NUR
NURSE NOTES:

Received report from BRANDON Bond. Patient is in bed, awake and alert x4. Visitor at bedside. 
On room air with no signs of distress or SOB. No c/o pain at this time. Right FA IV intact. 
Bed locked and in lowest position. Call light in reach. Will continue to monitor the 
patient.

## 2019-09-03 NOTE — NUR
NURSE NOTES:

Patient blood sugar noted to be 66. Gave the patient 2 cups of juice. Blood sugar is now 74. 
Left a message through Dr. Hall's answering service. Will follow plan of care as ordered 
and continue to monitor.

## 2019-09-03 NOTE — DIAGNOSTIC IMAGING REPORT
Indication: Left second toe gangrene

 

Technique: Sagittal, axial, and coronal T1 FSE and FSE STIR images of the left

forefoot

 

Comparison: Reference made to plain radiographs dated the eighth 30 2019

 

Findings: There is markedly increased STIR signal and markedly decreased T1 signal in

the second distal phalanx. There is evidence of extensive surrounding soft tissue

ulceration and tissue loss with possible exposure of the bone. There is subtle

slightly increased STIR signal within the middle phalanx without evidence of

corresponding T1 abnormality. The more proximal second digit marrow signal is normal.

 

No other osseous marrow signal abnormality is demonstrated. Extensive high STIR

signal is seen within the dorsal soft tissues. There is mildly increased

circumferential STIR signal in the soft tissues of the second digit and in the dorsal

soft tissues of the third and fourth digits. No focal drainable fluid collection to

suggest abscess is demonstrated. No definite large tendon abnormality.

 

Impression: Abnormal STIR and T1 signal within the second distal phalanx, indicative

of osteomyelitis. Subtle increased STIR signal within the second middle phalanx

without corresponding T1 signal abnormality. This may reflect reactive changes or

very early osteomyelitis

 

Dorsal edema. This may indicate cellulitis or may be vasogenic in origin

## 2019-09-03 NOTE — CDS PHYSICIAN QUERY
Clarification is required for compliance, coding accuracy, and to reflect 
severity of illness for this patient





Dear Belle Casas MD   Date: 09/03/2019



/CDS Name: Franki Gee   



On progress notes documented:



diabetic nephropathy, renal failure



Labs: Cr:2.9-->1.9



Please Clarify the type of renal failure below:



Etiology



[] Acute Renal Failure w/ Tubular Necrosis   

[] Acute Renal Failure w/ Cortical Necrosis   

[] Acute Renal Failure w/ Medullary Necrosis      

[] Acute Renal Failure (unspecified)

[] Other: _______________________



If Chronic, please specify the stage:



[] CKD Stage 1            

[] CKD Stage 2   

[] CKD Stage 3   

[] CKD Stage 4

[] CKD Stage 5

[] ESRD

[] Not applicable   

   



Present on Admission:  []  Yes          []  No         []  Clinically 
Undetermined





_________________                                    _____________

Physician signature                                       Date



Please also document in your Progress Notes and/or Discharge Summary and 
indicate if the condition was present on admission.

ANDREZ

## 2019-09-03 NOTE — GENERAL PROGRESS NOTE
Assessment/Plan


Problem List:  


(1) Hyperglycemia


ICD Codes:  R73.9 - Hyperglycemia, unspecified


SNOMED:  16350975


(2) Renal failure


ICD Codes:  N19 - Unspecified kidney failure


SNOMED:  28471541


Qualifiers:  


   Qualified Codes:  N19 - Unspecified kidney failure


(3) Elevated lactic acid level


ICD Codes:  R79.89 - Other specified abnormal findings of blood chemistry


SNOMED:  1752647


(4) Gangrene of toe of left foot


ICD Codes:  I96 - Gangrene, not elsewhere classified


SNOMED:  77053713685628269


(5) DM (diabetes mellitus)


ICD Codes:  E11.9 - Type 2 diabetes mellitus without complications


SNOMED:  33048115


Status:  stable


Assessment/Plan:


74 y/o male admitted with L second toe foul smelling necrosis.





# L second toe distal dry gangrene/ osteomyelitis shown on MRI foot. Vascular 

studies show patent arteries. 


# Diabetic foot ulcer


Cefepime and Vancomycin- renal dose 


ESR and CRP weekly


Surgical consult with Dr. Meléndez appreciated 


PICC consult for possible long term antibiotics


ID consult to guide antibiotic coverage 





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 


Patient denies prior renal disease and nephrology was consulted. Dr. Torres


IVF and monitor BMP


UA with microalbumin


Renal US reviewed 


MRI abdomen without contrast to work up bladder nodule seen on US 


continue to hold Losartan 





# IDDM- poorly controlled- HbA1c 10.4


finger stick glucose has been less than 200 on NPH 40 AM, 20 PM 


diabetic diet 





# Disposition


PT evaluation for possible need of assistive devices.





FULL CODE


DVT and GI ppx





I spent 40 minutes on this encounter, 50% on counselling and care coordination





Subjective


Date patient seen:  Sep 3, 2019


ROS Limited/Unobtainable:  No


Constitutional:  Denies: no symptoms, chills, diaphoresis, fever, malaise, 

weakness, other


HEENT:  Denies: no symptoms, eye pain, blurred vision, tearing, double vision, 

ear pain, ear discharge, nose pain, nose congestion, throat pain, throat 

swelling, mouth pain, mouth swelling, other


Cardiovascular:  Denies: no symptoms, chest pain, edema, irregular heart rate, 

lightheadedness, palpitations, syncope, other


Respiratory:  Denies: no symptoms, cough, orthopnea, shortness of breath, SOB 

with excertion, SOB at rest, sputum, stridor, wheezing, other


Gastrointestinal/Abdominal:  Reports: no symptoms, abdomen distended, abdominal 

pain, black stools, tarry stools, blood in stool, constipated, diarrhea, 

difficulty swallowing, nausea, poor appetite, poor fluid intake, rectal bleeding

, vomiting, other


Genitourinary:  Denies: no symptoms, burning, discharge, frequency, flank pain, 

hematuria, incontinence, pain, urgency, other


Neurologic/Psychiatric:  Denies: no symptoms, anxiety, depressed, emotional 

problems, headache, numbness, paresthesia, pre-existing deficit, seizure, 

tingling, tremors, weakness, other


Endocrine:  Denies: no symptoms, excessive sweating, flushing, intolerance to 

cold, intolerance to heat, increased hunger, increased thirst, increased urine, 

unexplained weight gain, unexplained weight loss, other


Hematologic/Lymphatic:  Denies: no symptoms, anemia, easy bleeding, easy 

bruising, other


Allergies:  


Coded Allergies:  


     No Known Allergies (Unverified , 6/2/16)


Subjective


Seen and examined. 


he is resting comfortably


however later told the nurse he was having diarrhea and some abdominal pain





Objective





Last 24 Hour Vital Signs








  Date Time  Temp Pulse Resp B/P (MAP) Pulse Ox O2 Delivery O2 Flow Rate FiO2


 


9/3/19 12:00 97.9 82 16 120/44 (69) 94   


 


9/3/19 09:45  78  134/65    


 


9/3/19 09:00      Room Air  


 


9/3/19 08:00 98.1 78 18 134/65 (88) 99   


 


9/3/19 04:00 98.0 72 19 137/72 (93) 98   


 


9/3/19 00:00 98.0 93 20 132/68 (89) 97   


 


9/2/19 21:00      Room Air  


 


9/2/19 20:34  86  126/71    


 


9/2/19 20:00 97.3 86 18 126/71 (89) 98   


 


9/2/19 16:00 97.7 85 18 142/73 (96) 97   

















Intake and Output  


 


 9/2/19 9/3/19





 19:00 07:00


 


Intake Total 800 ml 240 ml


 


Output Total 560 ml 


 


Balance 240 ml 240 ml


 


  


 


Intake Oral 800 ml 240 ml


 


Output Urine Total 560 ml 


 


# Voids 3 2








Laboratory Tests


9/3/19 05:19: 


Urine Random Creatinine [Pending], Urine Random Microalbumin [Pending], Urine 

Microalbumin/Creatinine Ratio [Pending]


9/3/19 06:52: 


White Blood Count 8.4, Red Blood Count 3.64L, Hemoglobin 10.3L, Hematocrit 31.7L

, Mean Corpuscular Volume 87, Mean Corpuscular Hemoglobin 28.4, Mean 

Corpuscular Hemoglobin Concent 32.6, Red Cell Distribution Width 10.9L, 

Platelet Count 269, Mean Platelet Volume 6.4L, Neutrophils (%) (Auto) 65.6, 

Lymphocytes (%) (Auto) 23.0, Monocytes (%) (Auto) 8.7, Eosinophils (%) (Auto) 

1.8, Basophils (%) (Auto) 0.9, Sodium Level 142, Potassium Level 3.9, Chloride 

Level 109H, Carbon Dioxide Level 21, Anion Gap 12, Blood Urea Nitrogen 18, 

Creatinine 1.6H, Estimat Glomerular Filtration Rate , Glucose Level 153H, 

Calcium Level 8.9, Total Bilirubin 0.3, Aspartate Amino Transf (AST/SGOT) 67H, 

Alanine Aminotransferase (ALT/SGPT) 45, Alkaline Phosphatase 74, Total Protein 

6.7, Albumin 2.6L, Globulin 4.1, Albumin/Globulin Ratio 0.6L


Height (Feet):  5


Height (Inches):  6.00


Weight (Pounds):  160


Objective


General appearance:  alert, cooperative, no distress, appears stated age


Head:  Normocephalic, without obvious abnormality, atraumatic


Eyes:  conjunctivae/corneas clear. PERRL, EOM's intact. Fundi benign


Throat:  Lips, mucosa, and tongue normal. Teeth and gums normal


Neck:  supple, symmetrical, trachea midline, no adenopathy, thyroid: not 

enlarged, symmetric, no tenderness/mass/nodules, no carotid bruit and no JVD


Lungs:  clear to auscultation bilaterally


Heart:  regular rate and rhythm, S1, S2 normal, no murmur, click, rub or gallop


Abdomen:  soft, non-tender. Bowel sounds normal. No masses,  no organomegaly


Extremities:  extremities normal, atraumatic, necrotic left second toe. foul 

odor. no drainage 


Pulses:  2+ and symmetric


Skin:  Skin color, texture, turgor normal. No rashes or lesions


Neurologic:  Grossly normal





MIPS


Medication Reconciliation


Is this a Psycho/Diag encounte:  No





Depression


Does this Patient have Dementi:  No











Guillermo Tompkins M.D. Sep 3, 2019 13:16

## 2019-09-03 NOTE — NUR
Received report from Rubia at [facility] that patient's vancomycin random result is 
5.9. She said max dose she can order is a one time dose of 1.5GM. Orders in and carried out. 
Charge RN aware.

## 2019-09-03 NOTE — NUR
NURSE NOTES:



PATIENT RETURNED TO THE FLOOR, OBSERVED EATING DINNER SITTING UPRIGHT IN BED. PATIENT 
STABLE. WILL CONTINUE TO MONITOR.

## 2019-09-04 VITALS — SYSTOLIC BLOOD PRESSURE: 133 MMHG | DIASTOLIC BLOOD PRESSURE: 73 MMHG

## 2019-09-04 VITALS — SYSTOLIC BLOOD PRESSURE: 138 MMHG | DIASTOLIC BLOOD PRESSURE: 60 MMHG

## 2019-09-04 VITALS — SYSTOLIC BLOOD PRESSURE: 132 MMHG | DIASTOLIC BLOOD PRESSURE: 84 MMHG

## 2019-09-04 VITALS — SYSTOLIC BLOOD PRESSURE: 135 MMHG | DIASTOLIC BLOOD PRESSURE: 58 MMHG

## 2019-09-04 VITALS — SYSTOLIC BLOOD PRESSURE: 132 MMHG | DIASTOLIC BLOOD PRESSURE: 62 MMHG

## 2019-09-04 LAB
ADD MANUAL DIFF: NO
ANION GAP SERPL CALC-SCNC: 11 MMOL/L (ref 5–15)
BASOPHILS NFR BLD AUTO: 0.9 % (ref 0–2)
BUN SERPL-MCNC: 20 MG/DL (ref 7–18)
CALCIUM SERPL-MCNC: 9 MG/DL (ref 8.5–10.1)
CHLORIDE SERPL-SCNC: 111 MMOL/L (ref 98–107)
CO2 SERPL-SCNC: 22 MMOL/L (ref 21–32)
CREAT SERPL-MCNC: 1.6 MG/DL (ref 0.55–1.3)
EOSINOPHIL NFR BLD AUTO: 2.2 % (ref 0–3)
ERYTHROCYTE [DISTWIDTH] IN BLOOD BY AUTOMATED COUNT: 11.1 % (ref 11.6–14.8)
HCT VFR BLD CALC: 31.3 % (ref 42–52)
HGB BLD-MCNC: 10.5 G/DL (ref 14.2–18)
LYMPHOCYTES NFR BLD AUTO: 26.1 % (ref 20–45)
MCV RBC AUTO: 85 FL (ref 80–99)
MONOCYTES NFR BLD AUTO: 8.6 % (ref 1–10)
NEUTROPHILS NFR BLD AUTO: 62.3 % (ref 45–75)
PLATELET # BLD: 280 K/UL (ref 150–450)
POTASSIUM SERPL-SCNC: 4.3 MMOL/L (ref 3.5–5.1)
RBC # BLD AUTO: 3.67 M/UL (ref 4.7–6.1)
SODIUM SERPL-SCNC: 144 MMOL/L (ref 136–145)
WBC # BLD AUTO: 9.6 K/UL (ref 4.8–10.8)

## 2019-09-04 PROCEDURE — 02HV33Z INSERTION OF INFUSION DEVICE INTO SUPERIOR VENA CAVA, PERCUTANEOUS APPROACH: ICD-10-PCS

## 2019-09-04 RX ADMIN — METRONIDAZOLE SCH MG: 500 TABLET ORAL at 14:22

## 2019-09-04 RX ADMIN — HUMAN INSULIN SCH UNITS: 100 INJECTION, SUSPENSION SUBCUTANEOUS at 17:20

## 2019-09-04 RX ADMIN — SODIUM CHLORIDE SCH MLS/HR: 0.9 INJECTION INTRAVENOUS at 16:53

## 2019-09-04 RX ADMIN — METRONIDAZOLE SCH MG: 500 TABLET ORAL at 20:57

## 2019-09-04 RX ADMIN — CHLORHEXIDINE GLUCONATE SCH APPLIC: 213 SOLUTION TOPICAL at 20:57

## 2019-09-04 RX ADMIN — ENOXAPARIN SODIUM SCH MG: 40 INJECTION SUBCUTANEOUS at 20:58

## 2019-09-04 RX ADMIN — HUMAN INSULIN SCH UNITS: 100 INJECTION, SUSPENSION SUBCUTANEOUS at 06:59

## 2019-09-04 NOTE — NUR
CASE MANAGEMENT: REVIEW 



9/4/2019



SI:GANGRENE LEFT SECOND GREAT TOE. DM. 

T 98.4 HR 88 RR 18 B/P 132/62 SATS 98% ON RA 

 BUN 20 CR 1.6  



IS: FLAGYL PO Q8H 

LOPRESSOR PO QHS 

INSULIN SUBQ AC 

PROTONIX PO QD

NORVASC POQD

INSULIN HUMAN NPH SUBQ BEFORE DINNER 

VANCO IV Q24H 

CEFEPIME IV Q24H 

LOVENOX SUBQ QHS 



***MED/SURG STATUS****



DCP: PATIENT TO BE DISCHARGED TO HOME ONCE MEDICALLY CLEARED.

## 2019-09-04 NOTE — NEPHROLOGY PROGRESS NOTE
Assessment/Plan


Problem List:  


(1) Hyperglycemia


(2) Elevated lactic acid level


(3) Gangrene of toe of left foot


(4) Renal failure


(5) Dehydration


(6) HTN (hypertension)


(7) DM (diabetes mellitus)


Plan


# Acute kidney injury on possibley CKD stage III- likely diabetic nephropathy


# L second toe distal dry gangrene- with osteol


# IDDM


# HTN- controlled


# Diabetic foot ulcer





- hold losartan for now- ok to resume on DC-


- needs outpatient follow up with renal 


- continue to monitor off IVF- resume when/if NPO


- continue metop 100 at night


- continue amlodipine 5mg daily 


- check renal US--> 





IMPRESSION:     


1.  No hydronephrosis.


2.  Nodular lesion arising from the prostate or bladder base, measures 1.


8 x 1.2 cm





mri pelvic to better delineate lesion --> Indentation of the bladder floor 














- cefepime and Vancomycin- renal dose 


- avoid supratherapeutic vanco level


- podiatry/surgery eval noted 


- PICCLine





Subjective


Interval Events/Complaints


Ct stable at 1.6


mri pelvis done showing Indentation of the bladder floor


Subjective


no significant pain


No fevers or chills. 


mri foot done


pelvis mri done showing Indentation of the bladder floor





Objective


Objective





Last 24 Hour Vital Signs








  Date Time  Temp Pulse Resp B/P (MAP) Pulse Ox O2 Delivery O2 Flow Rate FiO2


 


9/4/19 16:05 98.4 93 18 138/60 (86) 97   


 


9/4/19 12:00 99.1 86 18 132/84 (100) 97   


 


9/4/19 09:28      Room Air  


 


9/4/19 08:45  88  132/62    


 


9/4/19 08:37 98.4 88 18 132/62 (85) 98   


 


9/4/19 04:00 97.9 84 17 135/58 (83) 98   


 


9/3/19 23:40 98.1 84 16 133/68 (89) 97   


 


9/3/19 21:00      Room Air  


 


9/3/19 20:51  91  130/66    


 


9/3/19 20:00 98.0 91 18 130/66 (87) 99   

















Intake and Output  


 


 9/3/19 9/4/19





 19:00 07:00


 


Intake Total 780 ml 


 


Balance 780 ml 


 


  


 


Intake Oral 780 ml 


 


# Voids 3 3








Laboratory Tests


9/3/19 21:55: Random Vancomycin Level 5.9


9/4/19 05:30: 


Sodium Level 144, Potassium Level 4.3, Chloride Level 111H, Carbon Dioxide 

Level 22, Anion Gap 11, Blood Urea Nitrogen 20H, Creatinine 1.6H, Estimat 

Glomerular Filtration Rate , Glucose Level 122H, Calcium Level 9.0


9/4/19 06:30: 


White Blood Count 9.6, Red Blood Count 3.67L, Hemoglobin 10.5L, Hematocrit 31.3L

, Mean Corpuscular Volume 85, Mean Corpuscular Hemoglobin 28.7, Mean 

Corpuscular Hemoglobin Concent 33.7, Red Cell Distribution Width 11.1L, 

Platelet Count 280, Mean Platelet Volume 6.5, Neutrophils (%) (Auto) 62.3, 

Lymphocytes (%) (Auto) 26.1, Monocytes (%) (Auto) 8.6, Eosinophils (%) (Auto) 

2.2, Basophils (%) (Auto) 0.9, Erythrocyte Sedimentation Rate 73H, C-Reactive 

Protein, Quantitative 1.4H


Height (Feet):  5


Height (Inches):  6.00


Weight (Pounds):  181


General Appearance:  alert oriented x3


EENT:  PERRL/EOMI


Neck:  non-tender, normal alignment, supple


Cardiovascular:  normal peripheral pulses, normal rate, regular rhythm


Respiratory/Chest:  lungs clear, normal breath sounds, no respiratory distress


Abdomen:  normal bowel sounds, non tender, soft


Extremities:  normal range of motion, other


Neurologic:  alert, oriented x 3











Kyler Torres M.D. Sep 4, 2019 16:39

## 2019-09-04 NOTE — INFECTIOUS DISEASES PROG NOTE
Assessment/Plan


Assessment/Plan








Full consult dictated:





left foot 2nd toe gangrene and osteomyelitis


wc - staph aureus and klebsiella


continue vancomycin, cefepime and flagyl


picc line placed, 6 weeks iv abx 


consider podiatry evaluation


thank you





Subjective


Allergies:  


Coded Allergies:  


     No Known Allergies (Unverified , 6/2/16)





Objective


Vital Signs





Last 24 Hour Vital Signs








  Date Time  Temp Pulse Resp B/P (MAP) Pulse Ox O2 Delivery O2 Flow Rate FiO2


 


9/4/19 16:05 98.4 93 18 138/60 (86) 97   


 


9/4/19 12:00 99.1 86 18 132/84 (100) 97   


 


9/4/19 09:28      Room Air  


 


9/4/19 08:45  88  132/62    


 


9/4/19 08:37 98.4 88 18 132/62 (85) 98   


 


9/4/19 04:00 97.9 84 17 135/58 (83) 98   


 


9/3/19 23:40 98.1 84 16 133/68 (89) 97   


 


9/3/19 21:00      Room Air  


 


9/3/19 20:51  91  130/66    


 


9/3/19 20:00 98.0 91 18 130/66 (87) 99   








Height (Feet):  5


Height (Inches):  6.00


Weight (Pounds):  181





Laboratory Tests








Test


  9/3/19


21:55 9/4/19


05:30 9/4/19


06:30


 


Random Vancomycin Level 5.9 ug/mL    


 


Sodium Level


  


  144 MMOL/L


(136-145) 


 


 


Potassium Level


  


  4.3 MMOL/L


(3.5-5.1) 


 


 


Chloride Level


  


  111 MMOL/L


()  H 


 


 


Carbon Dioxide Level


  


  22 MMOL/L


(21-32) 


 


 


Anion Gap


  


  11 mmol/L


(5-15) 


 


 


Blood Urea Nitrogen


  


  20 mg/dL


(7-18)  H 


 


 


Creatinine


  


  1.6 MG/DL


(0.55-1.30)  H 


 


 


Estimat Glomerular Filtration


Rate 


   mL/min (>60)  


  


 


 


Glucose Level


  


  122 MG/DL


()  H 


 


 


Calcium Level


  


  9.0 MG/DL


(8.5-10.1) 


 


 


White Blood Count


  


  


  9.6 K/UL


(4.8-10.8)


 


Red Blood Count


  


  


  3.67 M/UL


(4.70-6.10)  L


 


Hemoglobin


  


  


  10.5 G/DL


(14.2-18.0)  L


 


Hematocrit


  


  


  31.3 %


(42.0-52.0)  L


 


Mean Corpuscular Volume   85 FL (80-99)  


 


Mean Corpuscular Hemoglobin


  


  


  28.7 PG


(27.0-31.0)


 


Mean Corpuscular Hemoglobin


Concent 


  


  33.7 G/DL


(32.0-36.0)


 


Red Cell Distribution Width


  


  


  11.1 %


(11.6-14.8)  L


 


Platelet Count


  


  


  280 K/UL


(150-450)


 


Mean Platelet Volume


  


  


  6.5 FL


(6.5-10.1)


 


Neutrophils (%) (Auto)


  


  


  62.3 %


(45.0-75.0)


 


Lymphocytes (%) (Auto)


  


  


  26.1 %


(20.0-45.0)


 


Monocytes (%) (Auto)


  


  


  8.6 %


(1.0-10.0)


 


Eosinophils (%) (Auto)


  


  


  2.2 %


(0.0-3.0)


 


Basophils (%) (Auto)


  


  


  0.9 %


(0.0-2.0)


 


Erythrocyte Sedimentation Rate


  


  


  73 MM/HR


(0-20)  H


 


C-Reactive Protein,


Quantitative 


  


  1.4 mg/dL


(0.00-0.90)  H











Current Medications








 Medications


  (Trade)  Dose


 Ordered  Sig/Sherly


 Route


 PRN Reason  Start Time


 Stop Time Status Last Admin


Dose Admin


 


 Acetaminophen


  (Tylenol)  650 mg  Q4H  PRN


 ORAL


 Mild Pain (Pain Scale 1-3)  8/30/19 18:30


 9/29/19 18:29   


 


 


 Acetaminophen/


 Hydrocodone Bitart


  (Norco 5/325)  1 tab  QIDPRN  PRN


 ORAL


 pain  8/30/19 18:30


 9/6/19 18:29  9/3/19 12:56


 


 


 Amlodipine


 Besylate


  (Norvasc)  5 mg  DAILY


 ORAL


   9/2/19 09:00


 10/2/19 08:59  9/4/19 08:45


 


 


 Cefepime HCl 2 gm/


 Dextrose  110 ml @ 


 220 mls/hr  Q24H


 IV


   9/4/19 16:00


 9/11/19 15:59   


 


 


 Chlorhexidine


 Gluconate


  (Nickie-Hex 2%)  1 applic  DAILY@2000


 TOPIC


   9/3/19 20:00


 10/3/19 19:59  9/3/19 20:50


 


 


 Dextrose


  (Dextrose 50%)  25 ml  Q30M  PRN


 IV


 Hypoglycemia  8/30/19 18:30


 9/29/19 18:29   


 


 


 Dextrose


  (Dextrose 50%)  50 ml  Q30M  PRN


 IV


 Hypoglycemia  8/30/19 18:30


 9/29/19 18:29   


 


 


 Enoxaparin Sodium


  (Lovenox)  40 mg  QHS


 SUBQ


   9/4/19 21:00


 10/4/19 20:59   


 


 


 Heparin Sodium/


 Sodium Chloride


  (Heparin 1000


 units/500ml


 Premix)  1,000 unit  ONCE  PRN


 IV


 PICC LINE INSERTION  9/3/19 13:15


 9/5/19 13:14   


 


 


 Insulin Human NPH


  (Humulin N)  20 units  BEFORE  DINNER


 SUBQ


   8/31/19 16:30


 9/30/19 16:29  9/3/19 16:39


 


 


 Insulin Human NPH


  (Humulin N)  40 units  BEFORE  BREAKFAST


 SUBQ


   8/31/19 06:30


 9/30/19 06:29  9/4/19 06:59


 


 


 Lidocaine HCl


  (Xylocaine 1%


 30ml)  30 ml  ONCE  PRN


 INJ


 PICC LINE INSERTION  9/3/19 13:15


 9/5/19 13:14   


 


 


 Metoprolol


 Tartrate


  (Lopressor)  100 mg  BEDTIME


 ORAL


   8/31/19 21:00


 9/30/19 20:59  9/3/19 20:51


 


 


 Metronidazole


  (Flagyl)  500 mg  EVERY 8  HOURS


 ORAL


   9/4/19 14:00


 9/11/19 13:59  9/4/19 14:22


 


 


 Morphine Sulfate


  (Morphine


 Sulfate)  1 mg  Q2H  PRN


 IVP


 For Pain  8/30/19 18:30


 9/6/19 18:29   


 


 


 Pantoprazole


  (Protonix)  40 mg  DAILY


 ORAL


   8/31/19 09:00


 9/30/19 08:59  9/4/19 08:44


 


 


 Polyethylene


 Glycol


  (Miralax)  17 gm  HSPRN  PRN


 ORAL


 Constipation  8/30/19 18:30


 9/29/19 18:29   


 


 


 Vancomycin HCl


  (Vanco rx to


 dose)  1 ea  DAILY  PRN


 MISC


 Per rx protocol  8/30/19 18:30


 9/29/19 18:29   


 


 


 Vancomycin/Sodium


 Chloride  275 ml @ 


 183.333


 mls/hr  Q24H


 IVPB


   9/5/19 02:00


 9/10/19 01:59   


 

















Eve Milan MD Sep 4, 2019 16:27

## 2019-09-04 NOTE — NUR
NURSE NOTES:

Patient awake and alert and oriented.Dressing to the left foot is clean and intact,no 
complaints of pain at this time.patient eating breakfast.Call light within reach.

## 2019-09-04 NOTE — NUR
RD ASSESSMENT & RECOMMENDATIONS

SEE CARE ACTIVITY FOR COMPLETE ASSESSMENT



DAILY ESTIMATED NEEDS:

Needs based on DM, OM 69kg adj  

25-30  kcals/kg 

0296-5875  total kcals

1.25-1.5  g protein/kg

  g total protein 

25-30  mL/kg

7279-0645  total fluid mLs



NUTRITION DIAGNOSIS:

1) Increased pro needs r/t wound healing as evidence by L foot DM ulcer,

w/ OM per MD.

2) Altered nutrition related lab values r/t diabetes as evidenced by A1C

10.4, Uglu 4+ on adm.



CURRENT DIET: CCHO MED     





PO DIET RECOMMENDATIONS:

Consider CCHO LOW + Double protein portions  





ADDITIONAL RECOMMENDATIONS:

1) Obtain a standing weight as able 

2) Wound care: JAYLEEN BID + MVI x1 + vit C 250mg BID  

3) Diet edu as able  

4) Add high pro/ 1 carb snacks in b/w meals

## 2019-09-04 NOTE — SURGERY PROGRESS NOTE
Surgery Progress Note


Subjective


Symptoms:  improved, tolerating diet, voiding well, passing flatus, BM, pain 

decreased





Objective





Last 24 Hour Vital Signs








  Date Time  Temp Pulse Resp B/P (MAP) Pulse Ox O2 Delivery O2 Flow Rate FiO2


 


9/4/19 09:28      Room Air  


 


9/4/19 08:45  88  132/62    


 


9/4/19 08:37 98.4 88 18 132/62 (85) 98   


 


9/4/19 04:00 97.9 84 17 135/58 (83) 98   


 


9/3/19 23:40 98.1 84 16 133/68 (89) 97   


 


9/3/19 21:00      Room Air  


 


9/3/19 20:51  91  130/66    


 


9/3/19 20:00 98.0 91 18 130/66 (87) 99   


 


9/3/19 16:00 98.3 90 17 135/59 (84) 99   








I&O











Intake and Output  


 


 9/3/19 9/4/19





 19:00 07:00


 


Intake Total 780 ml 


 


Balance 780 ml 


 


  


 


Intake Oral 780 ml 


 


# Voids 3 3








Dressing:  dry


Wound:  clean


Cardiovascular:  RSR


Respiratory:  clear


Abdomen:  soft, flat, present bowel sounds, non-distended


Extremities:  edema, cyanosis, no tenderness





Laboratory Tests








Test


  9/3/19


21:55 9/4/19


05:30 9/4/19


06:30


 


Random Vancomycin Level 5.9 ug/mL    


 


Sodium Level


  


  144 MMOL/L


(136-145) 


 


 


Potassium Level


  


  4.3 MMOL/L


(3.5-5.1) 


 


 


Chloride Level


  


  111 MMOL/L


()  H 


 


 


Carbon Dioxide Level


  


  22 MMOL/L


(21-32) 


 


 


Anion Gap


  


  11 mmol/L


(5-15) 


 


 


Blood Urea Nitrogen


  


  20 mg/dL


(7-18)  H 


 


 


Creatinine


  


  1.6 MG/DL


(0.55-1.30)  H 


 


 


Estimat Glomerular Filtration


Rate 


   mL/min (>60)  


  


 


 


Glucose Level


  


  122 MG/DL


()  H 


 


 


Calcium Level


  


  9.0 MG/DL


(8.5-10.1) 


 


 


White Blood Count


  


  


  9.6 K/UL


(4.8-10.8)


 


Red Blood Count


  


  


  3.67 M/UL


(4.70-6.10)  L


 


Hemoglobin


  


  


  10.5 G/DL


(14.2-18.0)  L


 


Hematocrit


  


  


  31.3 %


(42.0-52.0)  L


 


Mean Corpuscular Volume   85 FL (80-99)  


 


Mean Corpuscular Hemoglobin


  


  


  28.7 PG


(27.0-31.0)


 


Mean Corpuscular Hemoglobin


Concent 


  


  33.7 G/DL


(32.0-36.0)


 


Red Cell Distribution Width


  


  


  11.1 %


(11.6-14.8)  L


 


Platelet Count


  


  


  280 K/UL


(150-450)


 


Mean Platelet Volume


  


  


  6.5 FL


(6.5-10.1)


 


Neutrophils (%) (Auto)


  


  


  62.3 %


(45.0-75.0)


 


Lymphocytes (%) (Auto)


  


  


  26.1 %


(20.0-45.0)


 


Monocytes (%) (Auto)


  


  


  8.6 %


(1.0-10.0)


 


Eosinophils (%) (Auto)


  


  


  2.2 %


(0.0-3.0)


 


Basophils (%) (Auto)


  


  


  0.9 %


(0.0-2.0)


 


Erythrocyte Sedimentation Rate


  


  


  73 MM/HR


(0-20)  H


 


C-Reactive Protein,


Quantitative 


  


  1.4 mg/dL


(0.00-0.90)  H











Plan


Problems:  


(1) Elevated lactic acid level


Assessment & Plan:  Patient presented with lactic acidosis, cellulitis and 

infection of his left foot with necrosis and gangrene of the left second toe.


Continue with his resuscitation


Okay for diet





(2) Gangrene of toe of left foot


Assessment & Plan:  Patient presents with left second ray gangrene dry foul-

smelling no purulent drainage edema and cellulitis of the foot.  Has had it for 

a few weeks and worsening since his trip to [place].


Plain films noted no fracture


IV antibiotics as per infectious disease


Pending venous and arterial studies as well





MRI: Impression: Abnormal STIR and T1 signal within the second distal phalanx, 

indicative


of osteomyelitis. Subtle increased STIR signal within the second middle phalanx


without corresponding T1 signal abnormality. This may reflect reactive changes 

or


very early osteomyelitis Dorsal edema. This may indicate cellulitis or may be 

vasogenic in origin





Swab necrotic area of left foot with Betadine wash foot daily apply gauze 

dressing


We will follow with recommendations as results of imaging are available


We will attempt to salvage as much possible but potentially may need an 

amputation


cont with IV abx and local wound care.  defer to ID for abx duration


Podiatry input appreciated


Strict glucose monitoring and control


Thank you will follow with recommendations





(3) DM (diabetes mellitus)











Raciel Meléndez Sep 4, 2019 14:19

## 2019-09-04 NOTE — BRIEF OPERATIVE NOTE
Immediate Post Operative Note


Operative Note


Pre-op Diagnosis:


needs long term IV access


Procedure:


PICC


Surgeon:  MIRANDA RAZO


Anesthesia:  local


Specimen:  none


Complications:  none


Fluids:  none


Implant(s) used?:  No











Franki Razo MD Sep 4, 2019 10:54

## 2019-09-04 NOTE — DIAGNOSTIC IMAGING REPORT
Indication: Evaluation of abnormality seen on prior ultrasound and CT scan

 

Technique: Axial and sagittal T2 FRFSE, axial and coronal T2 FRFSE fat-saturated,

axial and sagittal T1 FSE, axial T1 FSE fat saturated images obtained of the pelvis

 

Comparison: Reference made to retroperitoneal ultrasound dated 9/2/2019, CT scan

dated 8/12/2016

 

Findings: The prostate measures 3.7 cm transverse by 2.9 cm AP 4.3 cm craniocaudad.

The posterior superior aspect of the prostate protrudes into and indents the bladder

floor, probably accounting for the findings demonstrated on prior sonogram. The

appearance of this is also similar on the prior CT scan. There is slight

trabeculation of the bladder wall. The remainder of the pelvic viscera appear

unremarkable. The bones appear unremarkable. There is a small amount of free fluid

within the pelvis.

 

Impression: Indentation of the bladder floor by the superior aspect of the prostate,

presumably accounting for findings reported on recent sonogram. Note also similar

findings on prior CT scan

 

Trace fluid within the pelvis. Of uncertain significance but not physiologic in a

male patient

 

Slight bladder trabeculation, could indicate chronic outlet obstruction

## 2019-09-04 NOTE — CONSULTATION
DATE OF CONSULTATION:  09/04/2019

INFECTIOUS DISEASE CONSULTATION



CONSULTING PHYSICIAN:  Eve Milan M.D.



ATTENDING PHYSICIAN:  Sue Hall M.D.



REFERRING PHYSICIAN:  Dr. Guillermo Tompkins.



REASON FOR CONSULTATION:  Left foot second toe gangrene and osteo wound

infection and cellulitis.



CHIEF COMPLAINT:  The patient's complaint coming into the hospital is left

foot second toe gangrene.  Also cellulitis and diabetes.



HISTORY OF PRESENT ILLNESS:  This is a very pleasant 73-year-old male who

is non-English speaking, who comes into [facility] was noted to have

left foot second toe gangrene.  The patient had an MRI of the left foot,

which showed osteo of the left foot second toe.  Report was noted.  The

patient has gangrene, possible infected wound and cellulitis.  The wound

culture grew out Klebsiella and Staphylococcus aureus and diphtheroids of

the left foot.  Diphtheroids likely contaminant.  Infectious Disease

consult is requested.  The patient was placed on vancomycin, cefepime, and

Flagyl.  The patient was seen by Surgery and I discussed the case with Dr. Tompkins.  I believe Podiatry will also be called to see this patient.  MAR

was noted.  Orders were noted.  Notes and records were reviewed.  The

patient will be continued on cefepime, flagyl and vancomycin.



REVIEW OF SYSTEMS:  CONSTITUTIONAL:  Main issue is left foot second toe

gangrene.  He has no fevers.  He has no chills.  He is alert and

responsive.  HEAD AND NECK:  No head pain or neck pain.  CARDIAC:  No

chest pain.  PULMONARY:  No congestion or shortness of of breath.

GASTROINTESTINAL:  No nausea, vomiting, or diarrhea.  GENITOURINARY:  No

An.  No CVA tenderness.  SKIN:  No rash.  EXTREMITIES:  Extremity has

left foot second toe gangrene.  NEUROLOGIC:  No seizures.



PAST MEDICAL HISTORY:  The patient has a past medical history of diabetes.

He has left foot second toe gangrene.  He has insulin dependent diabetes.

He has a history of diabetic nephropathy, elevated creatinine, and anemia.

He has elevated sed rate.  He has history of BPH and history of

hypertension also.



ALLERGIES:  No known drug allergies.



SOCIAL HISTORY:  Negative for smoking, alcohol, or drug abuse.



FAMILY HISTORY:  Noncontributory.



MEDICATIONS:  Upon reviewing the MAR, he is on the following medications;

he is on vancomycin, cefepime, and Flagyl.  He is on Lovenox.  He is on

chlorhexidine, heparin, amlodipine, metoprolol, insulin, and pantoprazole.

He is on acetaminophen.  He is on morphine.  Outside medications noted

and reconciliated.



PHYSICAL EXAMINATION:

VITAL SIGNS:  Temperature is 99.1, pulse 86, respiratory rate 18, and blood

pressure 132/84.  Saturation 97%.

GENERAL:  Alert and responsive, in no distress.

HEAD AND NECK:  Oral exam, no thrush.  Eyes, no icterus.  No JVD.

Normocephalic.

HEART:  Regular.  No gallop or murmur.

LUNGS:  Clear bilaterally.  No rhonchi or rales.

ABDOMEN:  Soft.  Positive bowel sounds.  Nontender.

GENITOURINARY:  No An.

SKIN:  No rash.

MUSCULOSKELETAL:  No effusion.  Legs are without cellulitis.

PERIPHERAL VASCULAR:  The patient has left foot second toe gangrene and

possible infected wound.

NEUROLOGICAL:  Intact and nonfocal.



LABORATORY AND DIAGNOSTIC DATA:  White count 9.6, hemoglobin 10.5, and

platelet count 73,000.  Creatinine is 1.6.  LFTs were noted.  Cultures of

left foot second toe had Klebsiella and Staphylococcus aureus.

Sensitivities were noted.  Most likely organism is Staphylococcus

aureus.



IMAGING STUDIES:  MRI of the left foot showed findings consistent with

osteo and edema, possible cellulitis.  This was of the second distal

phalanx.  Report was noted.



ASSESSMENT AND PLAN:

1. The patient has left foot second toe gangrene with osteomyelitis,

infected wound and cellulitis.  MAR was noted.  This time we will continue

vancomycin, cefepime, and Flagyl.  Wound cultures with Staphylococcus

aureus and Klebsiella.  Diphtheroids likely contaminant.  Continue

vancomycin, cefepime, and Flagyl.  The patient has a PICC line.  The

patient may need six weeks of IV antibiotics, however, Podiatry also be

called to see if the patient will need amputation of the toe.  Continue

vancomycin, cefepime, and Flagyl for now for left foot second toe

gangrene, osteomyelitis, infected wound and cellulitis pending final

workup.  Case was discussed with Dr. Tompkins.

2. Anemia.

3. Elevated creatinine.

4. Diabetes.

5. Hypertension.

6. Blood sugar and blood pressure treatment per primary.

7. BPH.

8. No known allergies.

9. Social history is negative.

10. Family history is noncontributory.

11. MAR was noted.

12. Case was discussed with RN.

13. Continue treatment per primary consultants.









  ______________________________________________

  Eve Milan M.D.





DR:  BISMARK

D:  09/04/2019 14:49

T:  09/04/2019 15:40

JOB#:  [id number]

CC:



ANDREZ

## 2019-09-04 NOTE — NUR
RADIOLOGY NOTE:

LEFT UPPER EXTREMITY PICC

-------------------------------------------------------------------------------

Addendum: 09/04/19 at 1047 by KRISTY COBB CRT RAD

-------------------------------------------------------------------------------

LEFT UPPER EXTREMITY PICC PLACED.

## 2019-09-04 NOTE — NUR
*-* INSURANCE *-*



ALL CLINICALS AND REVIEWS HAVE BEEN FAXED TO:



[facility]# [id number]

F: [phone]

## 2019-09-04 NOTE — DIAGNOSTIC IMAGING REPORT
Indications: Needs long-term IV access

 

Technique: Ultrasound confirms patent compressible of basilic vein. Total sterile

technique, including  sterile probe cover and sterile gel, hat, mask, sterile gown,

large sterile drape, and preparation with 2% chlorhexidine utilized. Local anesthesia

with 1% lidocaine. Under real-time ultrasound guidance, puncture filling vein using

21-gauge needle, documented and archived, passage 0.018 guidewire under direct

fluoroscopy, which was used to determine appropriate catheter length, exchange for 4

[place] peel-away sheath. 4 [place] Bard  dual-lumen power PICC cut to 47 cm. It was

inserted through the peel-away sheath. Peel-away sheath and guidewire removed.

Catheter fixed to the skin. Both catheter ports aspirated and flushed. Patient

tolerated procedure well, without immediate complication. Digital radiograph

documents satisfactory catheter tip position, at the cavoatrial junction. 

 

Total fluoroscopy time 41 seconds.

Total dose area product  0.11390  mGym2

Total number of images: 1

 

Impression: Successful placement of left arm PICC under sonographic and fluoroscopic

guidance, as described above.

## 2019-09-04 NOTE — PRE-PROCEDURE NOTE/ATTESTATION
Pre-Procedure Note/Attestation


Complete Prior to Procedure


Planned Procedure:  not applicable


Procedure Narrative:


PICC





Indications for Procedure


Pre-Operative Diagnosis:


needs long term IV access





Attestation


I attest that I discussed the nature of the procedure; its benefits; risks and 

complications; and alternatives (and the risks and benefits of such alternatives

), prior to the procedure, with the patient (or the patient's legal 

representative).





I attest that, if there was a reasonable possibility of needing a blood 

transfusion, the patient (or the patient's legal representative) was given the 

[facility] standardized written summary, pursuant 

to the [place] Blood Safety Act ([place] Health and Safety Code # 1645, as 

amended).





I attest that I re-evaluated the patient just prior to the surgery and that 

there has been no change in the patient's H&P, except as documented below:











Franki Razo MD Sep 4, 2019 10:53

## 2019-09-04 NOTE — GENERAL PROGRESS NOTE
Assessment/Plan


Problem List:  


(1) Hyperglycemia


ICD Codes:  R73.9 - Hyperglycemia, unspecified


SNOMED:  79745492


(2) Renal failure


ICD Codes:  N19 - Unspecified kidney failure


SNOMED:  91219232


Qualifiers:  


   Qualified Codes:  N19 - Unspecified kidney failure


(3) Elevated lactic acid level


ICD Codes:  R79.89 - Other specified abnormal findings of blood chemistry


SNOMED:  1434525


(4) Gangrene of toe of left foot


ICD Codes:  I96 - Gangrene, not elsewhere classified


SNOMED:  95885639929436266


(5) DM (diabetes mellitus)


ICD Codes:  E11.9 - Type 2 diabetes mellitus without complications


SNOMED:  42945160


Status:  stable


Assessment/Plan:


72 y/o male admitted with L second toe foul smelling necrosis.





#Diarrhea 


Resolving


Stool for c diff 





# L second toe distal dry gangrene/ osteomyelitis shown on MRI foot. Vascular 

studies show patent arteries. 


# Diabetic foot ulcer


Cefepime and Vancomycin- renal dose 


ESR and CRP weekly


Surgical consult with Dr. Meléndez appreciated 


PICC consult for possible long term antibiotics


ID consult to guide antibiotic coverage 





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 


Patient denies prior renal disease and nephrology was consulted. Dr. Torres


IVF and monitor BMP


UA with microalbumin


Renal US reviewed 


MRI abdomen without contrast to work up bladder nodule seen on US 


continue to hold Losartan 





# IDDM- poorly controlled- HbA1c 10.4


finger stick glucose has been less than 200 on NPH 40 AM, 20 PM 


diabetic diet 





# Disposition


PT evaluation for possible need of assistive devices.





FULL CODE


DVT and GI ppx





I spent 40 minutes on this encounter, 50% on counselling and care coordination





Subjective


Date patient seen:  Sep 4, 2019


ROS Limited/Unobtainable:  No


Constitutional:  Denies: no symptoms, chills, diaphoresis, fever, malaise, 

weakness, other


Cardiovascular:  Denies: no symptoms, chest pain, edema, irregular heart rate, 

lightheadedness, palpitations, syncope, other


Respiratory:  Denies: no symptoms, cough, orthopnea, shortness of breath, SOB 

with excertion, SOB at rest, sputum, stridor, wheezing, other


Gastrointestinal/Abdominal:  Reports: abdominal pain, diarrhea


Genitourinary:  Denies: no symptoms, burning, discharge, frequency, flank pain, 

hematuria, incontinence, pain, urgency, other


Neurologic/Psychiatric:  Denies: no symptoms, anxiety, depressed, emotional 

problems, headache, numbness, paresthesia, pre-existing deficit, seizure, 

tingling, tremors, weakness, other


Endocrine:  Denies: no symptoms, excessive sweating, flushing, intolerance to 

cold, intolerance to heat, increased hunger, increased thirst, increased urine, 

unexplained weight gain, unexplained weight loss, other


Hematologic/Lymphatic:  Denies: no symptoms, anemia, easy bleeding, easy 

bruising, other


Allergies:  


Coded Allergies:  


     No Known Allergies (Unverified , 6/2/16)


Subjective


Seen and examined. 


mild abdominal pain 


diarrhea resolving today





Objective





Last 24 Hour Vital Signs








  Date Time  Temp Pulse Resp B/P (MAP) Pulse Ox O2 Delivery O2 Flow Rate FiO2


 


9/4/19 09:28      Room Air  


 


9/4/19 08:45  88  132/62    


 


9/4/19 08:37 98.4 88 18 132/62 (85) 98   


 


9/4/19 04:00 97.9 84 17 135/58 (83) 98   


 


9/3/19 23:40 98.1 84 16 133/68 (89) 97   


 


9/3/19 21:00      Room Air  


 


9/3/19 20:51  91  130/66    


 


9/3/19 20:00 98.0 91 18 130/66 (87) 99   


 


9/3/19 16:00 98.3 90 17 135/59 (84) 99   


 


9/3/19 12:00 97.9 82 16 120/44 (69) 94   

















Intake and Output  


 


 9/3/19 9/4/19





 19:00 07:00


 


Intake Total 780 ml 


 


Balance 780 ml 


 


  


 


Intake Oral 780 ml 


 


# Voids 3 3








Laboratory Tests


9/3/19 21:55: Random Vancomycin Level 5.9


9/4/19 05:30: 


Sodium Level 144, Potassium Level 4.3, Chloride Level 111H, Carbon Dioxide 

Level 22, Anion Gap 11, Blood Urea Nitrogen 20H, Creatinine 1.6H, Estimat 

Glomerular Filtration Rate , Glucose Level 122H, Calcium Level 9.0


9/4/19 06:30: 


White Blood Count 9.6, Red Blood Count 3.67L, Hemoglobin 10.5L, Hematocrit 31.3L

, Mean Corpuscular Volume 85, Mean Corpuscular Hemoglobin 28.7, Mean 

Corpuscular Hemoglobin Concent 33.7, Red Cell Distribution Width 11.1L, 

Platelet Count 280, Mean Platelet Volume 6.5, Neutrophils (%) (Auto) 62.3, 

Lymphocytes (%) (Auto) 26.1, Monocytes (%) (Auto) 8.6, Eosinophils (%) (Auto) 

2.2, Basophils (%) (Auto) 0.9, Erythrocyte Sedimentation Rate 73H, C-Reactive 

Protein, Quantitative 1.4H


Height (Feet):  5


Height (Inches):  6.00


Weight (Pounds):  181


Objective


General appearance:  alert, cooperative, no distress, appears stated age


Head:  Normocephalic, without obvious abnormality, atraumatic


Eyes:  conjunctivae/corneas clear. PERRL, EOM's intact. Fundi benign


Throat:  Lips, mucosa, and tongue normal. Teeth and gums normal


Neck:  supple, symmetrical, trachea midline, no adenopathy, thyroid: not 

enlarged, symmetric, no tenderness/mass/nodules, no carotid bruit and no JVD


Lungs:  clear to auscultation bilaterally


Heart:  regular rate and rhythm, S1, S2 normal, no murmur, click, rub or gallop


Abdomen:  soft, non-tender. Bowel sounds normal. No masses,  no organomegaly


Extremities:  extremities normal, atraumatic, necrotic left second toe. foul 

odor. no drainage 


Pulses:  2+ and symmetric


Skin:  Skin color, texture, turgor normal. No rashes or lesions


Neurologic:  Grossly normal











Guillermo Tompkins M.D. Sep 4, 2019 11:22

## 2019-09-05 VITALS — SYSTOLIC BLOOD PRESSURE: 127 MMHG | DIASTOLIC BLOOD PRESSURE: 68 MMHG

## 2019-09-05 VITALS — SYSTOLIC BLOOD PRESSURE: 139 MMHG | DIASTOLIC BLOOD PRESSURE: 70 MMHG

## 2019-09-05 VITALS — SYSTOLIC BLOOD PRESSURE: 138 MMHG | DIASTOLIC BLOOD PRESSURE: 69 MMHG

## 2019-09-05 VITALS — DIASTOLIC BLOOD PRESSURE: 62 MMHG | SYSTOLIC BLOOD PRESSURE: 107 MMHG

## 2019-09-05 VITALS — DIASTOLIC BLOOD PRESSURE: 60 MMHG | SYSTOLIC BLOOD PRESSURE: 119 MMHG

## 2019-09-05 VITALS — SYSTOLIC BLOOD PRESSURE: 131 MMHG | DIASTOLIC BLOOD PRESSURE: 74 MMHG

## 2019-09-05 LAB
ADD MANUAL DIFF: NO
ALBUMIN SERPL-MCNC: 2.5 G/DL (ref 3.4–5)
ALBUMIN/GLOB SERPL: 0.7 {RATIO} (ref 1–2.7)
ALP SERPL-CCNC: 72 U/L (ref 46–116)
ALT SERPL-CCNC: 37 U/L (ref 12–78)
ANION GAP SERPL CALC-SCNC: 12 MMOL/L (ref 5–15)
AST SERPL-CCNC: 39 U/L (ref 15–37)
BASOPHILS NFR BLD AUTO: 0.9 % (ref 0–2)
BILIRUB SERPL-MCNC: 0.2 MG/DL (ref 0.2–1)
BUN SERPL-MCNC: 23 MG/DL (ref 7–18)
CALCIUM SERPL-MCNC: 8.8 MG/DL (ref 8.5–10.1)
CHLORIDE SERPL-SCNC: 111 MMOL/L (ref 98–107)
CO2 SERPL-SCNC: 21 MMOL/L (ref 21–32)
CREAT SERPL-MCNC: 1.7 MG/DL (ref 0.55–1.3)
EOSINOPHIL NFR BLD AUTO: 2.1 % (ref 0–3)
ERYTHROCYTE [DISTWIDTH] IN BLOOD BY AUTOMATED COUNT: 11.1 % (ref 11.6–14.8)
GLOBULIN SER-MCNC: 3.7 G/DL
HCT VFR BLD CALC: 29.2 % (ref 42–52)
HGB BLD-MCNC: 9.6 G/DL (ref 14.2–18)
LYMPHOCYTES NFR BLD AUTO: 25.6 % (ref 20–45)
MCV RBC AUTO: 87 FL (ref 80–99)
MONOCYTES NFR BLD AUTO: 10.2 % (ref 1–10)
NEUTROPHILS NFR BLD AUTO: 61.2 % (ref 45–75)
PLATELET # BLD: 245 K/UL (ref 150–450)
POTASSIUM SERPL-SCNC: 3.7 MMOL/L (ref 3.5–5.1)
RBC # BLD AUTO: 3.37 M/UL (ref 4.7–6.1)
SODIUM SERPL-SCNC: 144 MMOL/L (ref 136–145)
WBC # BLD AUTO: 8.1 K/UL (ref 4.8–10.8)

## 2019-09-05 RX ADMIN — SODIUM CHLORIDE SCH MLS/HR: 0.9 INJECTION INTRAVENOUS at 15:55

## 2019-09-05 RX ADMIN — CHLORHEXIDINE GLUCONATE SCH APPLIC: 213 SOLUTION TOPICAL at 20:53

## 2019-09-05 RX ADMIN — METRONIDAZOLE SCH MG: 500 TABLET ORAL at 13:59

## 2019-09-05 RX ADMIN — METRONIDAZOLE SCH MG: 500 TABLET ORAL at 20:53

## 2019-09-05 RX ADMIN — Medication SCH MLS/HR: at 01:26

## 2019-09-05 RX ADMIN — HUMAN INSULIN SCH UNITS: 100 INJECTION, SUSPENSION SUBCUTANEOUS at 05:21

## 2019-09-05 RX ADMIN — ENOXAPARIN SODIUM SCH MG: 40 INJECTION SUBCUTANEOUS at 20:54

## 2019-09-05 RX ADMIN — METRONIDAZOLE SCH MG: 500 TABLET ORAL at 05:20

## 2019-09-05 RX ADMIN — HUMAN INSULIN SCH UNITS: 100 INJECTION, SUSPENSION SUBCUTANEOUS at 17:19

## 2019-09-05 NOTE — NUR
DISCHARGE PLANNING: NOTE 



CLINICALS FAXED TO [facility]  FOR REVIEW 



T: [phone]

F: [phone]



PT NEEDS 6 WEEKS OF IV ANTIBX

## 2019-09-05 NOTE — SURGERY PROGRESS NOTE
Surgery Progress Note


Subjective


Additional Comments


spoke with podiatry


plan for vascular clearance and possible amputation 


patient states he is doing well


no complaints


labs noted





Objective





Last 24 Hour Vital Signs








  Date Time  Temp Pulse Resp B/P (MAP) Pulse Ox O2 Delivery O2 Flow Rate FiO2


 


9/5/19 12:00 98.3 85 18 127/68 (87) 96   


 


9/5/19 09:23  85  131/74    


 


9/5/19 09:00      Room Air  


 


9/5/19 08:00 97.9 85 19 131/74 (93) 98   


 


9/5/19 04:00 98.9 89 19 107/62 (77) 95   


 


9/5/19 00:10 98.9 88 19 119/60 (79) 96   


 


9/4/19 20:57  93  133/73    


 


9/4/19 20:14      Room Air  


 


9/4/19 19:48 98.1 93 18 133/73 (93) 96   


 


9/4/19 16:05 98.4 93 18 138/60 (86) 97   








I&O











Intake and Output  


 


 9/4/19 9/5/19





 19:00 07:00


 


Intake Total 600 ml 275.000 ml


 


Output Total 200 ml 350 ml


 


Balance 400 ml -75.000 ml


 


  


 


Intake Oral 600 ml 


 


IV Total  275.000 ml


 


Output Urine Total 200 ml 350 ml


 


# Voids  2








Dressing:  dry


Wound:  clean


Cardiovascular:  RSR


Respiratory:  clear


Abdomen:  soft, flat, non-tender, present bowel sounds, non-distended


Extremities:  cyanosis, no tenderness





Laboratory Tests








Test


  9/5/19


05:00


 


White Blood Count


  8.1 K/UL


(4.8-10.8)


 


Red Blood Count


  3.37 M/UL


(4.70-6.10)  L


 


Hemoglobin


  9.6 G/DL


(14.2-18.0)  L


 


Hematocrit


  29.2 %


(42.0-52.0)  L


 


Mean Corpuscular Volume 87 FL (80-99)  


 


Mean Corpuscular Hemoglobin


  28.4 PG


(27.0-31.0)


 


Mean Corpuscular Hemoglobin


Concent 32.8 G/DL


(32.0-36.0)


 


Red Cell Distribution Width


  11.1 %


(11.6-14.8)  L


 


Platelet Count


  245 K/UL


(150-450)


 


Mean Platelet Volume


  6.2 FL


(6.5-10.1)  L


 


Neutrophils (%) (Auto)


  61.2 %


(45.0-75.0)


 


Lymphocytes (%) (Auto)


  25.6 %


(20.0-45.0)


 


Monocytes (%) (Auto)


  10.2 %


(1.0-10.0)  H


 


Eosinophils (%) (Auto)


  2.1 %


(0.0-3.0)


 


Basophils (%) (Auto)


  0.9 %


(0.0-2.0)


 


Sodium Level


  144 MMOL/L


(136-145)


 


Potassium Level


  3.7 MMOL/L


(3.5-5.1)


 


Chloride Level


  111 MMOL/L


()  H


 


Carbon Dioxide Level


  21 MMOL/L


(21-32)


 


Anion Gap


  12 mmol/L


(5-15)


 


Blood Urea Nitrogen


  23 mg/dL


(7-18)  H


 


Creatinine


  1.7 MG/DL


(0.55-1.30)  H


 


Estimat Glomerular Filtration


Rate  mL/min (>60)  


 


 


Glucose Level


  127 MG/DL


()  H


 


Calcium Level


  8.8 MG/DL


(8.5-10.1)


 


Total Bilirubin


  0.2 MG/DL


(0.2-1.0)


 


Aspartate Amino Transf


(AST/SGOT) 39 U/L (15-37)


H


 


Alanine Aminotransferase


(ALT/SGPT) 37 U/L (12-78)


 


 


Alkaline Phosphatase


  72 U/L


()


 


Total Protein


  6.2 G/DL


(6.4-8.2)  L


 


Albumin


  2.5 G/DL


(3.4-5.0)  L


 


Globulin 3.7 g/dL  


 


Albumin/Globulin Ratio


  0.7 (1.0-2.7)


L











Plan


Problems:  


(1) Elevated lactic acid level


Assessment & Plan:  Patient presented with lactic acidosis, cellulitis and 

infection of his left foot with necrosis and gangrene of the left second toe.


Continue with his resuscitation


Okay for diet


improving


stable 





(2) Gangrene of toe of left foot


Assessment & Plan:  Patient presents with left second ray gangrene dry foul-

smelling no purulent drainage edema and cellulitis of the foot.  Has had it for 

a few weeks and worsening since his trip to [facility].


Plain films noted no fracture


IV antibiotics as per infectious disease


Pending venous and arterial studies as well





MRI: Impression: Abnormal STIR and T1 signal within the second distal phalanx, 

indicative


of osteomyelitis. Subtle increased STIR signal within the second middle phalanx


without corresponding T1 signal abnormality. This may reflect reactive changes 

or


very early osteomyelitis Dorsal edema. This may indicate cellulitis or may be 

vasogenic in origin





Swab necrotic area of left foot with Betadine wash foot daily apply gauze 

dressing


We will follow with recommendations as results of imaging are available


We will attempt to salvage as much possible but potentially may need an 

amputation


cont with IV abx and local wound care.  defer to ID for abx duration


Podiatry input appreciated


vascular clearance and likely amputation 


Strict glucose monitoring and control


Thank you will follow with recommendations





(3) DM (diabetes mellitus)











Raciel Meléndez Sep 5, 2019 15:54

## 2019-09-05 NOTE — GENERAL PROGRESS NOTE
Assessment/Plan


Problem List:  


(1) Hyperglycemia


ICD Codes:  R73.9 - Hyperglycemia, unspecified


SNOMED:  11947909


(2) Renal failure


ICD Codes:  N19 - Unspecified kidney failure


SNOMED:  56727246


Qualifiers:  


   Qualified Codes:  N19 - Unspecified kidney failure


(3) Elevated lactic acid level


ICD Codes:  R79.89 - Other specified abnormal findings of blood chemistry


SNOMED:  7457592


(4) Gangrene of toe of left foot


ICD Codes:  I96 - Gangrene, not elsewhere classified


SNOMED:  47130274861922049


(5) DM (diabetes mellitus)


ICD Codes:  E11.9 - Type 2 diabetes mellitus without complications


SNOMED:  18476264


Status:  stable


Assessment/Plan:


74 y/o male admitted with L second toe foul smelling necrosis, gangrene and 

osteomyelitis. 





# L second toe distal dry gangrene/ osteomyelitis shown on MRI foot. Vascular 

studies show patent arteries. 


# Diabetic foot ulcer


Cefepime and Vancomycin- renal dose, add Flagyl. wound cultures with klebsiella 

and staph aureus 


ESR and CRP weekly


Surgical consult with Dr. Meléndez appreciated 


PICC placed 


ID consult to guide antibiotic coverage appreciated 


Podiatry consult pending. may need amputation  


CM consult for home health services 








 


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 


Patient denies prior renal disease and nephrology was consulted. Dr. Torres


IVF and monitor BMP


UA with microalbumin


Renal US reviewed 


MRI abdomen without contrast to work up bladder nodule seen on US 


continue to hold Losartan, will resume on discharge 





# IDDM- poorly controlled- HbA1c 10.4


finger stick glucose has been less than 200 on NPH 40 AM, 20 PM 


diabetic diet 





# Disposition


PT evaluation for possible need of assistive devices.


Would go home with home services 





FULL CODE


DVT and GI ppx





I spent 40 minutes on this encounter, 50% on counselling and care coordination





Subjective


Date patient seen:  Sep 5, 2019


ROS Limited/Unobtainable:  No


Allergies:  


Coded Allergies:  


     No Known Allergies (Unverified , 6/2/16)


Subjective


Seen and examined. 


s/p picc


no abdominal pain, no diarrhea





Objective





Last 24 Hour Vital Signs








  Date Time  Temp Pulse Resp B/P (MAP) Pulse Ox O2 Delivery O2 Flow Rate FiO2


 


9/5/19 09:23  85  131/74    


 


9/5/19 09:00      Room Air  


 


9/5/19 08:00 97.9 85 19 131/74 (93) 98   


 


9/5/19 04:00 98.9 89 19 107/62 (77) 95   


 


9/5/19 00:10 98.9 88 19 119/60 (79) 96   


 


9/4/19 20:57  93  133/73    


 


9/4/19 20:14      Room Air  


 


9/4/19 19:48 98.1 93 18 133/73 (93) 96   


 


9/4/19 16:05 98.4 93 18 138/60 (86) 97   


 


9/4/19 12:00 99.1 86 18 132/84 (100) 97   

















Intake and Output  


 


 9/4/19 9/5/19





 18:59 06:59


 


Intake Total 600 ml 275.000 ml


 


Output Total 200 ml 350 ml


 


Balance 400 ml -75.000 ml


 


  


 


Intake Oral 600 ml 


 


IV Total  275.000 ml


 


Output Urine Total 200 ml 350 ml


 


# Voids  2








Laboratory Tests


9/5/19 05:00: 


White Blood Count 8.1, Red Blood Count 3.37L, Hemoglobin 9.6L, Hematocrit 29.2L

, Mean Corpuscular Volume 87, Mean Corpuscular Hemoglobin 28.4, Mean 

Corpuscular Hemoglobin Concent 32.8, Red Cell Distribution Width 11.1L, 

Platelet Count 245, Mean Platelet Volume 6.2L, Neutrophils (%) (Auto) 61.2, 

Lymphocytes (%) (Auto) 25.6, Monocytes (%) (Auto) 10.2H, Eosinophils (%) (Auto) 

2.1, Basophils (%) (Auto) 0.9, Sodium Level 144, Potassium Level 3.7, Chloride 

Level 111H, Carbon Dioxide Level 21, Anion Gap 12, Blood Urea Nitrogen 23H, 

Creatinine 1.7H, Estimat Glomerular Filtration Rate , Glucose Level 127H, 

Calcium Level 8.8, Total Bilirubin 0.2, Aspartate Amino Transf (AST/SGOT) 39H, 

Alanine Aminotransferase (ALT/SGPT) 37, Alkaline Phosphatase 72, Total Protein 

6.2L, Albumin 2.5L, Globulin 3.7, Albumin/Globulin Ratio 0.7L


Height (Feet):  5


Height (Inches):  6.00


Weight (Pounds):  181


Objective


General appearance:  alert, cooperative, no distress, appears stated age


Head:  Normocephalic, without obvious abnormality, atraumatic


Eyes:  conjunctivae/corneas clear. PERRL, EOM's intact. Fundi benign


Throat:  Lips, mucosa, and tongue normal. Teeth and gums normal


Neck:  supple, symmetrical, trachea midline, no adenopathy, thyroid: not 

enlarged, symmetric, no tenderness/mass/nodules, no carotid bruit and no JVD


Lungs:  clear to auscultation bilaterally


Heart:  regular rate and rhythm, S1, S2 normal, no murmur, click, rub or gallop


Abdomen:  soft, non-tender. Bowel sounds normal. No masses,  no organomegaly


Extremities:  extremities normal, atraumatic, necrotic left second toe. foul 

odor. no drainage . +picc present. clean and clear no erythema


Pulses:  2+ and symmetric


Skin:  Skin color, texture, turgor normal. No rashes or lesions


Neurologic:  Grossly normal











Guillermo Tompkins M.D. Sep 5, 2019 12:06

## 2019-09-05 NOTE — DIAGNOSTIC IMAGING REPORT
--------------- APPROVED REPORT --------------





CPT Code: 17019



Present Symptoms

Lower Extremity Pain:  Left 

Stasis Disease

Comments: Lt foot second toe gangrene





BILATERAL: Imaging reveals a patent deep venous system bilaterally. There is no evidence 

of thrombus within the common femoral, superficial femoral, popliteal or tibial segments. 

The greater saphenous veins are within normal limits. Doppler indicates normal 

spontaneous flow within these segments.

## 2019-09-05 NOTE — NUR
NURSE NOTES:

Received pt from [facility]. Pt is alert and orient x4. pt is in RA, no SOB or acute 
respiratory distress noted.  pt has intact iv access RFA 22g SL. Pt has PICC NATALI SL. Pt is 
eating breakfast independently. all needs attended, bed is locked and is in the lowest 
position. call light within easy reach. will continue to monitor

## 2019-09-05 NOTE — NEPHROLOGY PROGRESS NOTE
Assessment/Plan


Problem List:  


(1) Hyperglycemia


(2) Elevated lactic acid level


(3) Gangrene of toe of left foot


(4) Renal failure


(5) Dehydration


(6) HTN (hypertension)


(7) DM (diabetes mellitus)


Plan


# Acute kidney injury on possibley CKD stage III- likely diabetic nephropathy


# L second toe distal dry gangrene- with osteol


# IDDM


# HTN- controlled


# Diabetic foot ulcer





- hold losartan for now- ok to resume on DC-


- needs outpatient follow up with renal 


- continue to monitor off IVF- resume when/if NPO


- continue metop 100 at night


- continue amlodipine 5mg daily 


- check renal US--> 





IMPRESSION:     


1.  No hydronephrosis.


2.  Nodular lesion arising from the prostate or bladder base, measures 1.


8 x 1.2 cm





mri pelvic to better delineate lesion --> Indentation of the bladder floor 














- cefepime and Vancomycin- renal dose 


- avoid supratherapeutic vanco level


- podiatry/surgery eval noted 


- PICCLine





Subjective


Constitutional:  Denies: no symptoms, chills, diaphoresis, fever, malaise, 

weakness, other


HEENT:  Denies: no symptoms, eye pain, blurred vision, tearing, double vision, 

ear pain, ear discharge, nose pain, nose congestion, throat pain, throat 

swelling, mouth pain, mouth swelling, other


Genitourinary:  Denies: no symptoms, burning, discharge, frequency, flank pain, 

hematuria, incontinence, pain, urgency, other


Neurologic/Psychiatric:  Denies: no symptoms, anxiety, depressed, emotional 

problems, headache, numbness, paresthesia, pre-existing deficit, seizure, 

tingling, tremors, weakness, other


Subjective


no significant pain


No fevers or chills. 


mri foot done


pelvis mri done showing Indentation of the bladder floor 


s/p PICC line placed


Cr 1.7





Objective


Objective





Last 24 Hour Vital Signs








  Date Time  Temp Pulse Resp B/P (MAP) Pulse Ox O2 Delivery O2 Flow Rate FiO2


 


9/5/19 12:00 98.3 85 18 127/68 (87) 96   


 


9/5/19 09:23  85  131/74    


 


9/5/19 09:00      Room Air  


 


9/5/19 08:00 97.9 85 19 131/74 (93) 98   


 


9/5/19 04:00 98.9 89 19 107/62 (77) 95   


 


9/5/19 00:10 98.9 88 19 119/60 (79) 96   


 


9/4/19 20:57  93  133/73    


 


9/4/19 20:14      Room Air  


 


9/4/19 19:48 98.1 93 18 133/73 (93) 96   

















Intake and Output  


 


 9/4/19 9/5/19





 19:00 07:00


 


Intake Total 600 ml 275.000 ml


 


Output Total 200 ml 350 ml


 


Balance 400 ml -75.000 ml


 


  


 


Intake Oral 600 ml 


 


IV Total  275.000 ml


 


Output Urine Total 200 ml 350 ml


 


# Voids  2








Laboratory Tests


9/5/19 05:00: 


White Blood Count 8.1, Red Blood Count 3.37L, Hemoglobin 9.6L, Hematocrit 29.2L

, Mean Corpuscular Volume 87, Mean Corpuscular Hemoglobin 28.4, Mean 

Corpuscular Hemoglobin Concent 32.8, Red Cell Distribution Width 11.1L, 

Platelet Count 245, Mean Platelet Volume 6.2L, Neutrophils (%) (Auto) 61.2, 

Lymphocytes (%) (Auto) 25.6, Monocytes (%) (Auto) 10.2H, Eosinophils (%) (Auto) 

2.1, Basophils (%) (Auto) 0.9, Sodium Level 144, Potassium Level 3.7, Chloride 

Level 111H, Carbon Dioxide Level 21, Anion Gap 12, Blood Urea Nitrogen 23H, 

Creatinine 1.7H, Estimat Glomerular Filtration Rate , Glucose Level 127H, 

Calcium Level 8.8, Total Bilirubin 0.2, Aspartate Amino Transf (AST/SGOT) 39H, 

Alanine Aminotransferase (ALT/SGPT) 37, Alkaline Phosphatase 72, Total Protein 

6.2L, Albumin 2.5L, Globulin 3.7, Albumin/Globulin Ratio 0.7L


Height (Feet):  5


Height (Inches):  6.00


Weight (Pounds):  181


General Appearance:  WD/WN, no apparent distress, alert


EENT:  PERRL/EOMI


Neck:  non-tender, normal alignment


Cardiovascular:  normal rate, regular rhythm


Respiratory/Chest:  chest wall non-tender, lungs clear


Abdomen:  normal bowel sounds


Neurologic:  alert, oriented x 3











Kyler Torres M.D. Sep 5, 2019 16:25

## 2019-09-05 NOTE — NUR
*-* INSURANCE *-*



UPDATED CLINICALS HAVE BEEN FAXED TO:



[facility]

TRK# [id number]

F: [phone]

## 2019-09-05 NOTE — NUR
CASE MANAGEMENT: REVIEW 



9/5/2019



SI:GANGRENE LEFT SECOND GREAT TOE. DM. 

T 97.5 HR 97 RR 20 B/P 138/69 SATS 96% ON RA 

 BUN 23 CR 1.7  AST 39 



IS: FLAGYL PO Q8H 

LOPRESSOR PO QHS 

INSULIN SUBQ AC 

PROTONIX PO QD

NORVASC POQD

INSULIN HUMAN NPH SUBQ BEFORE DINNER 

VANCO IV Q24H 

CEFEPIME IV Q24H 

LOVENOX SUBQ QHS 



***MED/SURG STATUS****



DCP: PATIENT TO BE DISCHARGED TO HOME ONCE MEDICALLY CLEARED.

## 2019-09-05 NOTE — DIAGNOSTIC IMAGING REPORT
--------------- APPROVED REPORT --------------





CPT Code: 73247



Symptoms





Risk Factors

Left 





RIGHT LEG:  Common femoral artery waveform analysis is within normal limits at rest. 

Color flow duplex sonography reveals patency of the superficial femoral, popliteal, and 

tibial arteries. There is no evidence of stenosis or occlusion within these segments.

LEFT LEG: Common femoral artery waveform analysis is within normal limits at rest. Color 

flow duplex sonography reveals patency of the superficial femoral, popliteal, and tibial 

arteries. There is no evidence of stenosis or occlusion within these segments.

 Doppler dorsalis pedis artery waveform analysis is compatible with minimal ischemia at 

rest.

## 2019-09-06 VITALS — DIASTOLIC BLOOD PRESSURE: 79 MMHG | SYSTOLIC BLOOD PRESSURE: 127 MMHG

## 2019-09-06 VITALS — SYSTOLIC BLOOD PRESSURE: 145 MMHG | DIASTOLIC BLOOD PRESSURE: 80 MMHG

## 2019-09-06 VITALS — SYSTOLIC BLOOD PRESSURE: 147 MMHG | DIASTOLIC BLOOD PRESSURE: 69 MMHG

## 2019-09-06 VITALS — DIASTOLIC BLOOD PRESSURE: 69 MMHG | SYSTOLIC BLOOD PRESSURE: 114 MMHG

## 2019-09-06 VITALS — DIASTOLIC BLOOD PRESSURE: 59 MMHG | SYSTOLIC BLOOD PRESSURE: 130 MMHG

## 2019-09-06 VITALS — DIASTOLIC BLOOD PRESSURE: 74 MMHG | SYSTOLIC BLOOD PRESSURE: 131 MMHG

## 2019-09-06 LAB
ADD MANUAL DIFF: NO
ALBUMIN SERPL-MCNC: 2.4 G/DL (ref 3.4–5)
ALBUMIN/GLOB SERPL: 0.6 {RATIO} (ref 1–2.7)
ALP SERPL-CCNC: 75 U/L (ref 46–116)
ALT SERPL-CCNC: 43 U/L (ref 12–78)
ANION GAP SERPL CALC-SCNC: 9 MMOL/L (ref 5–15)
AST SERPL-CCNC: 47 U/L (ref 15–37)
BASOPHILS NFR BLD AUTO: 1 % (ref 0–2)
BILIRUB SERPL-MCNC: 0.2 MG/DL (ref 0.2–1)
BUN SERPL-MCNC: 20 MG/DL (ref 7–18)
CALCIUM SERPL-MCNC: 8.5 MG/DL (ref 8.5–10.1)
CHLORIDE SERPL-SCNC: 109 MMOL/L (ref 98–107)
CO2 SERPL-SCNC: 22 MMOL/L (ref 21–32)
CREAT SERPL-MCNC: 1.5 MG/DL (ref 0.55–1.3)
EOSINOPHIL NFR BLD AUTO: 2.6 % (ref 0–3)
ERYTHROCYTE [DISTWIDTH] IN BLOOD BY AUTOMATED COUNT: 11.2 % (ref 11.6–14.8)
GLOBULIN SER-MCNC: 4.3 G/DL
HCT VFR BLD CALC: 28.6 % (ref 42–52)
HGB BLD-MCNC: 9.5 G/DL (ref 14.2–18)
LYMPHOCYTES NFR BLD AUTO: 26.2 % (ref 20–45)
MCV RBC AUTO: 87 FL (ref 80–99)
MONOCYTES NFR BLD AUTO: 9 % (ref 1–10)
NEUTROPHILS NFR BLD AUTO: 61.3 % (ref 45–75)
PLATELET # BLD: 231 K/UL (ref 150–450)
POTASSIUM SERPL-SCNC: 3.9 MMOL/L (ref 3.5–5.1)
RBC # BLD AUTO: 3.29 M/UL (ref 4.7–6.1)
SODIUM SERPL-SCNC: 140 MMOL/L (ref 136–145)
WBC # BLD AUTO: 7.8 K/UL (ref 4.8–10.8)

## 2019-09-06 RX ADMIN — CHLORHEXIDINE GLUCONATE SCH APPLIC: 213 SOLUTION TOPICAL at 20:57

## 2019-09-06 RX ADMIN — METRONIDAZOLE SCH MG: 500 TABLET ORAL at 13:19

## 2019-09-06 RX ADMIN — SODIUM CHLORIDE SCH MLS/HR: 0.9 INJECTION INTRAVENOUS at 15:17

## 2019-09-06 RX ADMIN — METRONIDAZOLE SCH MG: 500 TABLET ORAL at 21:00

## 2019-09-06 RX ADMIN — HUMAN INSULIN SCH UNITS: 100 INJECTION, SUSPENSION SUBCUTANEOUS at 17:19

## 2019-09-06 RX ADMIN — Medication SCH MLS/HR: at 01:03

## 2019-09-06 RX ADMIN — ENOXAPARIN SODIUM SCH MG: 40 INJECTION SUBCUTANEOUS at 20:59

## 2019-09-06 RX ADMIN — HUMAN INSULIN SCH UNITS: 100 INJECTION, SUSPENSION SUBCUTANEOUS at 05:41

## 2019-09-06 RX ADMIN — METRONIDAZOLE SCH MG: 500 TABLET ORAL at 05:40

## 2019-09-06 NOTE — NUR
NOTES







SPOKE WITH CONSTANTINO FROM [facility] TRANSFER UNIT, PT PENDING FINANCIAL CLEARANCE FOR TRANSFER. 
WILL FOLLOW UP.









[facility] [phone]

## 2019-09-06 NOTE — SURGERY PROGRESS NOTE
Surgery Progress Note


Subjective


Additional Comments


no acute events


appreciate podiatry input


patient doing well without complaints





Objective





Last 24 Hour Vital Signs








  Date Time  Temp Pulse Resp B/P (MAP) Pulse Ox O2 Delivery O2 Flow Rate FiO2


 


9/6/19 12:00 97.9 62 19 127/79 (95) 96   


 


9/6/19 09:00      Room Air  


 


9/6/19 08:22  60  114/69    


 


9/6/19 08:00 97.6 60 18 114/69 (84) 98   


 


9/6/19 04:00 97.8 82 16 145/80 (101) 99   


 


9/6/19 00:00 98.5 84 18 130/59 (82) 94   


 


9/5/19 20:54  97  139/70    


 


9/5/19 20:09      Room Air  


 


9/5/19 20:00 98.2 97 20 139/70 (93) 95   


 


9/5/19 16:00 97.5 97 20 138/69 (92) 96   








I&O











Intake and Output  


 


 9/5/19 9/6/19





 18:59 06:59


 


Intake Total 760 ml 275.000 ml


 


Output Total  500 ml


 


Balance 760 ml -225.000 ml


 


  


 


Intake Oral 650 ml 


 


IV Total 110 ml 275.000 ml


 


Output Urine Total  500 ml


 


# Voids 3 








Dressing:  dry


Wound:  clean


Cardiovascular:  RSR


Respiratory:  clear


Abdomen:  soft, flat, non-tender, present bowel sounds


Extremities:  cyanosis, other





Laboratory Tests








Test


  9/6/19


05:30


 


White Blood Count


  7.8 K/UL


(4.8-10.8)


 


Red Blood Count


  3.29 M/UL


(4.70-6.10)  L


 


Hemoglobin


  9.5 G/DL


(14.2-18.0)  L


 


Hematocrit


  28.6 %


(42.0-52.0)  L


 


Mean Corpuscular Volume 87 FL (80-99)  


 


Mean Corpuscular Hemoglobin


  28.8 PG


(27.0-31.0)


 


Mean Corpuscular Hemoglobin


Concent 33.2 G/DL


(32.0-36.0)


 


Red Cell Distribution Width


  11.2 %


(11.6-14.8)  L


 


Platelet Count


  231 K/UL


(150-450)


 


Mean Platelet Volume


  6.0 FL


(6.5-10.1)  L


 


Neutrophils (%) (Auto)


  61.3 %


(45.0-75.0)


 


Lymphocytes (%) (Auto)


  26.2 %


(20.0-45.0)


 


Monocytes (%) (Auto)


  9.0 %


(1.0-10.0)


 


Eosinophils (%) (Auto)


  2.6 %


(0.0-3.0)


 


Basophils (%) (Auto)


  1.0 %


(0.0-2.0)


 


Sodium Level


  140 MMOL/L


(136-145)


 


Potassium Level


  3.9 MMOL/L


(3.5-5.1)


 


Chloride Level


  109 MMOL/L


()  H


 


Carbon Dioxide Level


  22 MMOL/L


(21-32)


 


Anion Gap


  9 mmol/L


(5-15)


 


Blood Urea Nitrogen


  20 mg/dL


(7-18)  H


 


Creatinine


  1.5 MG/DL


(0.55-1.30)  H


 


Estimat Glomerular Filtration


Rate  mL/min (>60)  


 


 


Glucose Level


  189 MG/DL


()  H


 


Calcium Level


  8.5 MG/DL


(8.5-10.1)


 


Total Bilirubin


  0.2 MG/DL


(0.2-1.0)


 


Aspartate Amino Transf


(AST/SGOT) 47 U/L (15-37)


H


 


Alanine Aminotransferase


(ALT/SGPT) 43 U/L (12-78)


 


 


Alkaline Phosphatase


  75 U/L


()


 


Total Protein


  6.7 G/DL


(6.4-8.2)


 


Albumin


  2.4 G/DL


(3.4-5.0)  L


 


Globulin 4.3 g/dL  


 


Albumin/Globulin Ratio


  0.6 (1.0-2.7)


L











Plan


Problems:  


(1) Elevated lactic acid level


Assessment & Plan:  Patient presented with lactic acidosis, cellulitis and 

infection of his left foot with necrosis and gangrene of the left second toe.


Continue with his resuscitation


Okay for diet


improving


stable 





(2) Gangrene of toe of left foot


Assessment & Plan:  Patient presents with left second ray gangrene dry foul-

smelling no purulent drainage edema and cellulitis of the foot.  Has had it for 

a few weeks and worsening since his trip to [place].


Plain films noted no fracture


IV antibiotics as per infectious disease


Pending venous and arterial studies as well





MRI: Impression: Abnormal STIR and T1 signal within the second distal phalanx, 

indicative


of osteomyelitis. Subtle increased STIR signal within the second middle phalanx


without corresponding T1 signal abnormality. This may reflect reactive changes 

or


very early osteomyelitis Dorsal edema. This may indicate cellulitis or may be 

vasogenic in origin





Swab necrotic area of left foot with Betadine wash foot daily apply gauze 

dressing


We will follow with recommendations as results of imaging are available


We will attempt to salvage as much possible but potentially may need an 

amputation


cont with IV abx and local wound care.  defer to ID for abx duration


Podiatry input appreciated


vascular clearance and likely amputation 


Strict glucose monitoring and control


Thank you will follow with recommendations





(3) DM (diabetes mellitus)











Raciel Meléndez Sep 6, 2019 15:03

## 2019-09-06 NOTE — NUR
SI:    GANGRENE OF GREAT TOE

T. 97.9 HR 62 RR 19 B/P 127/79

BUN 20 CR 1.5 AST 47 





IS:     VANCO IV

CEFEPIME IV

FLAGYL PO

PROTONIX

*******MED/SURG STATUS******

## 2019-09-06 NOTE — NUR
PT WEEKLY PROGRESS NOTE

Patient being seen by PT for therapeutic exercises, transfer training and gait training with 
WB precautions. Patient performs transfers and ambulation with supervision and FWW, some 
cueing for proper WB status LLE. Patient will continue to benefit from skilled inpatient PT 
intervention to address strength, balance, safety and functional mobility with WB 
precautions.

## 2019-09-06 NOTE — GENERAL PROGRESS NOTE
Progress Note


Progress Note


Patient seen and examined earlier





Ischemic left toe dry gangrene


Calcific multilevel PAD


2+ femorals absent pop pedal pulses b/l


IDDM


HTN


Hyperlipidemia


Renal failure





Images results reviewed





Rec


Will need selective left leg angiogram with minimal contrast to assess for 

revascularization prior to toe gangrene amputation


Antiplatelet and statin therapy


Strict sugar control





d/w pt and nurse


d/w pmd and podiatry











Anthony Brownlee MD Sep 6, 2019 22:36

## 2019-09-06 NOTE — INFECTIOUS DISEASES PROG NOTE
Assessment/Plan


Assessment/Plan








ASSESSMENT AND PLAN:





1. left foot 2nd toe gangrene with osteomyelitis/cellulitis and infected wound, 

wc - staph aureus and klebsiella 





   - continue vancomycin, cefepime and flagyl - day # 6 abx 


   - monitor labs


   - podiatry note reviewed - favor amputation and vascular surgery evaluation 


   


2. Anemia.


3. Elevated creatinine.


4. Diabetes.


5. Hypertension.


6. Blood sugar and blood pressure treatment per primary.


7. BPH.


8. No known allergies.


9. Social history is negative.


10. Family history is noncontributory.


11. MAR was noted.


12. Case was discussed with RN.


13. Continue treatment per primary consultants.





Subjective


Constitutional:  Denies: fever


HEENT:  Denies: congestion


Respiratory:  Denies: shortness of breath


Cardiovascular:  Denies: chest pain


Gastrointestinal/Abdominal:  Denies: nausea, vomiting, diarrhea


Genitourinary:  Denies: dysuria


Psychiatric:  Denies: depression


Skin:  Denies: rash


Hematologic:  Denies: bleeding


Musculoskeletal:  Denies: pain


Allergies:  


Coded Allergies:  


     No Known Allergies (Unverified , 6/2/16)





Objective


Vital Signs





Last 24 Hour Vital Signs








  Date Time  Temp Pulse Resp B/P (MAP) Pulse Ox O2 Delivery O2 Flow Rate FiO2


 


9/6/19 12:00 97.9 62 19 127/79 (95) 96   


 


9/6/19 09:00      Room Air  


 


9/6/19 08:22  60  114/69    


 


9/6/19 08:00 97.6 60 18 114/69 (84) 98   


 


9/6/19 04:00 97.8 82 16 145/80 (101) 99   


 


9/6/19 00:00 98.5 84 18 130/59 (82) 94   


 


9/5/19 20:54  97  139/70    


 


9/5/19 20:09      Room Air  


 


9/5/19 20:00 98.2 97 20 139/70 (93) 95   


 


9/5/19 16:00 97.5 97 20 138/69 (92) 96   








Height (Feet):  5


Height (Inches):  6.00


Weight (Pounds):  181


General Appearance:  no acute distress


HEENT:  normocephalic, atraumatic, anicteric, mucous membranes moist


Respiratory/Chest:  lungs clear, normal breath sounds, no respiratory distress, 

no accessory muscle use


Cardiovascular:  normal rate, regular rhythm, no gallop/murmur


Abdomen:  normal bowel sounds, soft, non tender, no organomegaly


Genitourinary:  other - no benjamin, no cva pain


Extremities:  other - left foot 2nd toe gangrene


Skin:  other - left foot second toe gangrene


Neurologic/Psychiatric:  CNs II-XII grossly normal, alert, oriented x 3, 

responsive


Lymphatic:  no neck adenopathy


Musculoskeletal:  no effusion


Objective





Left foot MRI:





Impression: Abnormal STIR and T1 signal within the second distal phalanx, 

indicative


of osteomyelitis. Subtle increased STIR signal within the second middle phalanx


without corresponding T1 signal abnormality. This may reflect reactive changes 

or


very early osteomyelitis


 


Dorsal edema. This may indicate cellulitis or may be vasogenic in origin





Microbiology








 Date/Time


Source Procedure


Growth Status


 


 


 8/30/19 17:00


Blood Blood Culture - Final


NO GROWTH AFTER 5 DAYS Complete





 8/30/19 16:33


Foot Left Gram Stain - Final Complete


 


 8/30/19 16:33 Wound Culture - Final


Klebsiella Oxytoca


Staphylococcus Aureus


Diphtheroids Complete











Laboratory Tests








Test


  9/6/19


05:30


 


White Blood Count


  7.8 K/UL


(4.8-10.8)


 


Red Blood Count


  3.29 M/UL


(4.70-6.10)  L


 


Hemoglobin


  9.5 G/DL


(14.2-18.0)  L


 


Hematocrit


  28.6 %


(42.0-52.0)  L


 


Mean Corpuscular Volume 87 FL (80-99)  


 


Mean Corpuscular Hemoglobin


  28.8 PG


(27.0-31.0)


 


Mean Corpuscular Hemoglobin


Concent 33.2 G/DL


(32.0-36.0)


 


Red Cell Distribution Width


  11.2 %


(11.6-14.8)  L


 


Platelet Count


  231 K/UL


(150-450)


 


Mean Platelet Volume


  6.0 FL


(6.5-10.1)  L


 


Neutrophils (%) (Auto)


  61.3 %


(45.0-75.0)


 


Lymphocytes (%) (Auto)


  26.2 %


(20.0-45.0)


 


Monocytes (%) (Auto)


  9.0 %


(1.0-10.0)


 


Eosinophils (%) (Auto)


  2.6 %


(0.0-3.0)


 


Basophils (%) (Auto)


  1.0 %


(0.0-2.0)


 


Sodium Level


  140 MMOL/L


(136-145)


 


Potassium Level


  3.9 MMOL/L


(3.5-5.1)


 


Chloride Level


  109 MMOL/L


()  H


 


Carbon Dioxide Level


  22 MMOL/L


(21-32)


 


Anion Gap


  9 mmol/L


(5-15)


 


Blood Urea Nitrogen


  20 mg/dL


(7-18)  H


 


Creatinine


  1.5 MG/DL


(0.55-1.30)  H


 


Estimat Glomerular Filtration


Rate  mL/min (>60)  


 


 


Glucose Level


  189 MG/DL


()  H


 


Calcium Level


  8.5 MG/DL


(8.5-10.1)


 


Total Bilirubin


  0.2 MG/DL


(0.2-1.0)


 


Aspartate Amino Transf


(AST/SGOT) 47 U/L (15-37)


H


 


Alanine Aminotransferase


(ALT/SGPT) 43 U/L (12-78)


 


 


Alkaline Phosphatase


  75 U/L


()


 


Total Protein


  6.7 G/DL


(6.4-8.2)


 


Albumin


  2.4 G/DL


(3.4-5.0)  L


 


Globulin 4.3 g/dL  


 


Albumin/Globulin Ratio


  0.6 (1.0-2.7)


L











Current Medications








 Medications


  (Trade)  Dose


 Ordered  Sig/Sherly


 Route


 PRN Reason  Start Time


 Stop Time Status Last Admin


Dose Admin


 


 Acetaminophen


  (Tylenol)  650 mg  Q4H  PRN


 ORAL


 Mild Pain (Pain Scale 1-3)  8/30/19 18:30


 9/29/19 18:29   


 


 


 Acetaminophen/


 Hydrocodone Bitart


  (Norco 5/325)  1 tab  QIDPRN  PRN


 ORAL


 pain  8/30/19 18:30


 9/6/19 18:29  9/3/19 12:56


 


 


 Amlodipine


 Besylate


  (Norvasc)  5 mg  DAILY


 ORAL


   9/2/19 09:00


 10/2/19 08:59  9/6/19 08:22


 


 


 Cefepime HCl 2 gm/


 Dextrose  110 ml @ 


 220 mls/hr  Q24H


 IV


   9/4/19 16:00


 9/11/19 15:59  9/5/19 15:55


 


 


 Chlorhexidine


 Gluconate


  (Nickie-Hex 2%)  1 applic  DAILY@2000


 TOPIC


   9/3/19 20:00


 10/3/19 19:59  9/5/19 20:53


 


 


 Dextrose


  (Dextrose 50%)  25 ml  Q30M  PRN


 IV


 Hypoglycemia  8/30/19 18:30


 9/29/19 18:29   


 


 


 Dextrose


  (Dextrose 50%)  50 ml  Q30M  PRN


 IV


 Hypoglycemia  8/30/19 18:30


 9/29/19 18:29   


 


 


 Enoxaparin Sodium


  (Lovenox)  40 mg  QHS


 SUBQ


   9/4/19 21:00


 10/4/19 20:59  9/5/19 20:54


 


 


 Insulin Human NPH


  (Humulin N)  20 units  BEFORE  DINNER


 SUBQ


   8/31/19 16:30


 9/30/19 16:29  9/5/19 17:19


 


 


 Insulin Human NPH


  (Humulin N)  40 units  BEFORE  BREAKFAST


 SUBQ


   8/31/19 06:30


 9/30/19 06:29  9/6/19 05:41


 


 


 Metoprolol


 Tartrate


  (Lopressor)  100 mg  BEDTIME


 ORAL


   8/31/19 21:00


 9/30/19 20:59  9/5/19 20:54


 


 


 Metronidazole


  (Flagyl)  500 mg  EVERY 8  HOURS


 ORAL


   9/4/19 14:00


 9/11/19 13:59  9/6/19 05:40


 


 


 Morphine Sulfate


  (Morphine


 Sulfate)  1 mg  Q2H  PRN


 IVP


 For Pain  8/30/19 18:30


 9/6/19 18:29   


 


 


 Pantoprazole


  (Protonix)  40 mg  DAILY


 ORAL


   8/31/19 09:00


 9/30/19 08:59  9/6/19 08:23


 


 


 Polyethylene


 Glycol


  (Miralax)  17 gm  HSPRN  PRN


 ORAL


 Constipation  8/30/19 18:30


 9/29/19 18:29   


 


 


 Vancomycin HCl


  (Vanco rx to


 dose)  1 ea  DAILY  PRN


 MISC


 Per rx protocol  8/30/19 18:30


 9/29/19 18:29   


 


 


 Vancomycin/Sodium


 Chloride  275 ml @ 


 183.333


 mls/hr  Q24H


 IVPB


   9/5/19 02:00


 9/10/19 01:59  9/6/19 01:03


 

















Eve Milan MD Sep 6, 2019 13:12

## 2019-09-06 NOTE — GENERAL PROGRESS NOTE
Assessment/Plan


Problem List:  


(1) Hyperglycemia


ICD Codes:  R73.9 - Hyperglycemia, unspecified


SNOMED:  29628621


(2) Renal failure


ICD Codes:  N19 - Unspecified kidney failure


SNOMED:  44592502


Qualifiers:  


   Qualified Codes:  N19 - Unspecified kidney failure


(3) Elevated lactic acid level


ICD Codes:  R79.89 - Other specified abnormal findings of blood chemistry


SNOMED:  7989292


(4) Gangrene of toe of left foot


ICD Codes:  I96 - Gangrene, not elsewhere classified


SNOMED:  69066322338929710


(5) DM (diabetes mellitus)


ICD Codes:  E11.9 - Type 2 diabetes mellitus without complications


SNOMED:  20502041


Status:  stable


Assessment/Plan:


72 y/o male admitted with L second toe foul smelling necrosis, gangrene and 

osteomyelitis. 





# L second toe distal dry gangrene/ osteomyelitis shown on MRI foot. Vascular 

studies show patent arteries. 


# Diabetic foot ulcer


Cefepime and Vancomycin- renal dose, and Flagyl. day 6. wound cultures with 

klebsiella and staph aureus 


ESR and CRP weekly


Surgical consult with Dr. Meléndez appreciated 


PICC placed 


ID consult to guide antibiotic coverage appreciated 


Podiatry consulted, Dr. Burrows, favoring amputation. 


Per vascular surgery, Dr. Brownlee, patient needs selective left leg angio 

prior to amputation. Will transfer to [facility] vs. [facility]   


 consult for transfer. [facility] contacted. Waiting for financial approval from 

insurance. 








 


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 


Patient denies prior renal disease and nephrology was consulted. Dr. Torres


IVF and monitor BMP


UA with microalbumin


Renal US reviewed 


MRI abdomen without contrast to work up bladder nodule seen on US 


continue to hold Losartan, will resume on discharge 





# IDDM- poorly controlled- HbA1c 10.4


finger stick glucose has been less than 200 on NPH 40 AM, 20 PM 


diabetic diet 





# Disposition


PT evaluation for possible need of assistive devices.


Would go home with home services 





FULL CODE


DVT and GI ppx





I spent 40 minutes on this encounter, 50% on counselling and care coordination





Subjective


Date patient seen:  Sep 6, 2019


ROS Limited/Unobtainable:  No


Constitutional:  Denies: no symptoms, chills, diaphoresis, fever, malaise, 

weakness, other


HEENT:  Denies: no symptoms, eye pain, blurred vision, tearing, double vision, 

ear pain, ear discharge, nose pain, nose congestion, throat pain, throat 

swelling, mouth pain, mouth swelling, other


Cardiovascular:  Denies: no symptoms, chest pain, edema, irregular heart rate, 

lightheadedness, palpitations, syncope, other


Respiratory:  Denies: no symptoms, cough, orthopnea, shortness of breath, SOB 

with excertion, SOB at rest, sputum, stridor, wheezing, other


Gastrointestinal/Abdominal:  Denies: no symptoms, abdomen distended, abdominal 

pain, black stools, tarry stools, blood in stool, constipated, diarrhea, 

difficulty swallowing, nausea, poor appetite, poor fluid intake, rectal bleeding

, vomiting, other


Genitourinary:  Denies: no symptoms, burning, discharge, frequency, flank pain, 

hematuria, incontinence, pain, urgency, other


Endocrine:  Denies: no symptoms, excessive sweating, flushing, intolerance to 

cold, intolerance to heat, increased hunger, increased thirst, increased urine, 

unexplained weight gain, unexplained weight loss, other


Hematologic/Lymphatic:  Denies: no symptoms, anemia, easy bleeding, easy 

bruising, other


Allergies:  


Coded Allergies:  


     No Known Allergies (Unverified , 6/2/16)


Subjective


Seen and examined. 


no abdominal pain, no diarrhea 


evaluated by podiatry, favors amputation


Per vascular surgery, Dr. Brownlee, patient needs a selective left leg 

angio





Objective





Last 24 Hour Vital Signs








  Date Time  Temp Pulse Resp B/P (MAP) Pulse Ox O2 Delivery O2 Flow Rate FiO2


 


9/6/19 12:00 97.9 62 19 127/79 (95) 96   


 


9/6/19 09:00      Room Air  


 


9/6/19 08:22  60  114/69    


 


9/6/19 08:00 97.6 60 18 114/69 (84) 98   


 


9/6/19 04:00 97.8 82 16 145/80 (101) 99   


 


9/6/19 00:00 98.5 84 18 130/59 (82) 94   


 


9/5/19 20:54  97  139/70    


 


9/5/19 20:09      Room Air  


 


9/5/19 20:00 98.2 97 20 139/70 (93) 95   


 


9/5/19 16:00 97.5 97 20 138/69 (92) 96   

















Intake and Output  


 


 9/5/19 9/6/19





 18:59 06:59


 


Intake Total 760 ml 275.000 ml


 


Output Total  500 ml


 


Balance 760 ml -225.000 ml


 


  


 


Intake Oral 650 ml 


 


IV Total 110 ml 275.000 ml


 


Output Urine Total  500 ml


 


# Voids 3 








Laboratory Tests


9/6/19 05:30: 


White Blood Count 7.8, Red Blood Count 3.29L, Hemoglobin 9.5L, Hematocrit 28.6L

, Mean Corpuscular Volume 87, Mean Corpuscular Hemoglobin 28.8, Mean 

Corpuscular Hemoglobin Concent 33.2, Red Cell Distribution Width 11.2L, 

Platelet Count 231, Mean Platelet Volume 6.0L, Neutrophils (%) (Auto) 61.3, 

Lymphocytes (%) (Auto) 26.2, Monocytes (%) (Auto) 9.0, Eosinophils (%) (Auto) 

2.6, Basophils (%) (Auto) 1.0, Sodium Level 140, Potassium Level 3.9, Chloride 

Level 109H, Carbon Dioxide Level 22, Anion Gap 9, Blood Urea Nitrogen 20H, 

Creatinine 1.5H, Estimat Glomerular Filtration Rate , Glucose Level 189H, 

Calcium Level 8.5, Total Bilirubin 0.2, Aspartate Amino Transf (AST/SGOT) 47H, 

Alanine Aminotransferase (ALT/SGPT) 43, Alkaline Phosphatase 75, Total Protein 

6.7, Albumin 2.4L, Globulin 4.3, Albumin/Globulin Ratio 0.6L


Height (Feet):  5


Height (Inches):  6.00


Weight (Pounds):  181


Objective


General appearance:  alert, cooperative, no distress, appears stated age


Head:  Normocephalic, without obvious abnormality, atraumatic


Eyes:  conjunctivae/corneas clear. PERRL, EOM's intact. Fundi benign


Throat:  Lips, mucosa, and tongue normal. Teeth and gums normal


Neck:  supple, symmetrical, trachea midline, no adenopathy, thyroid: not 

enlarged, symmetric, no tenderness/mass/nodules, no carotid bruit and no JVD


Lungs:  clear to auscultation bilaterally


Heart:  regular rate and rhythm, S1, S2 normal, no murmur, click, rub or gallop


Abdomen:  soft, non-tender. Bowel sounds normal. No masses,  no organomegaly


Extremities:  extremities normal, atraumatic, necrotic left second toe. foul 

odor. no drainage . +picc present. clean and clear no erythema


Pulses:  2+ and symmetric


Skin:  Skin color, texture, turgor normal. No rashes or lesions


Neurologic:  Grossly normal











Guillermo Tompkins M.D. Sep 6, 2019 15:41

## 2019-09-06 NOTE — NUR
NURSE NOTES:

Pt received in bed, speaking Yi, no c/o pain or signs of distress, call light within 
reach, PICC line left upper arm will flush, pt able to make needs known, will continue to 
monitor.

## 2019-09-06 NOTE — CONSULTATION
DATE OF CONSULTATION:  09/05/2019

CONSULTING PHYSICIAN:  Sue Hall M.D.



REASON FOR CONSULTATION:  Ulceration to the left second toe.



HISTORY OF PRESENTING ILLNESS:  This is a 73-year-old  male who was

admitted to [facility] with diabetic ulceration to left foot.

The patient has difficulty communicating due to language barrier, but

states that ulceration started with blister and then turned into dark

discoloration.  The patient denies fever, chills, nausea, and vomiting.

White count noted to be within normal limits.



PAST MEDICAL HISTORY:  Diabetes, peripheral vascular disease, peripheral

arterial disease, gangrene, and diabetic ulceration.



PHYSICAL EXAMINATION:

VASCULAR:  Dorsalis pedis and posterior tibial artery are nonpalpable.

Capillary filling time is greater than 5 seconds.  Edema is noted to

bilateral feet.  The feet distally noted to be cool to touch.

NEUROLOGICAL:  Sharp and dull proprioception, protected threshold noted to

be diminished consistent with peripheral neuropathy.

MUSCULOSKELETAL:  Range of motion and muscle strength noted to be within

normal limits.

DERMATOLOGICAL:  Attention was directed to the left foot where gangrenous 

changes noted to the distal aspect of the left second toe, distal to the

middle phalanx.  There is no drainage, discharge, or purulent matter.

There is periwound edema.  There is no cellulitis.  There is malodor at

this site.



ASSESSMENT AND PLAN:  Diabetic ulceration of left second toe with

gangrenous changes.



Order was written for application of Betadine.



Order was  written for Vascular consultation.  The patient will benefit

from second toe amputation once cleared by Vascular.









  ______________________________________________

  Reji Burrows D.P.M





DR:  Nakul

D:  09/05/2019 23:42

T:  09/06/2019 02:26

JOB#:  [id number]

CC:



ANDREZ

## 2019-09-06 NOTE — NUR
*-* INSURANCE *-*



UPDATED CLINICALS AND REVIEW HAVE BEEN FAXED TO:



[facility]# [id number]

F: [phone]

## 2019-09-07 VITALS — SYSTOLIC BLOOD PRESSURE: 125 MMHG | DIASTOLIC BLOOD PRESSURE: 57 MMHG

## 2019-09-07 VITALS — SYSTOLIC BLOOD PRESSURE: 121 MMHG | DIASTOLIC BLOOD PRESSURE: 53 MMHG

## 2019-09-07 VITALS — SYSTOLIC BLOOD PRESSURE: 115 MMHG | DIASTOLIC BLOOD PRESSURE: 60 MMHG

## 2019-09-07 VITALS — SYSTOLIC BLOOD PRESSURE: 128 MMHG | DIASTOLIC BLOOD PRESSURE: 62 MMHG

## 2019-09-07 VITALS — DIASTOLIC BLOOD PRESSURE: 68 MMHG | SYSTOLIC BLOOD PRESSURE: 133 MMHG

## 2019-09-07 VITALS — SYSTOLIC BLOOD PRESSURE: 129 MMHG | DIASTOLIC BLOOD PRESSURE: 47 MMHG

## 2019-09-07 LAB
ADD MANUAL DIFF: NO
ANION GAP SERPL CALC-SCNC: 11 MMOL/L (ref 5–15)
BASOPHILS NFR BLD AUTO: 1.1 % (ref 0–2)
BUN SERPL-MCNC: 20 MG/DL (ref 7–18)
CALCIUM SERPL-MCNC: 8.8 MG/DL (ref 8.5–10.1)
CHLORIDE SERPL-SCNC: 111 MMOL/L (ref 98–107)
CO2 SERPL-SCNC: 23 MMOL/L (ref 21–32)
CREAT SERPL-MCNC: 1.6 MG/DL (ref 0.55–1.3)
EOSINOPHIL NFR BLD AUTO: 2.7 % (ref 0–3)
ERYTHROCYTE [DISTWIDTH] IN BLOOD BY AUTOMATED COUNT: 11.6 % (ref 11.6–14.8)
HCT VFR BLD CALC: 29.2 % (ref 42–52)
HGB BLD-MCNC: 9.8 G/DL (ref 14.2–18)
LYMPHOCYTES NFR BLD AUTO: 25.5 % (ref 20–45)
MCV RBC AUTO: 86 FL (ref 80–99)
MONOCYTES NFR BLD AUTO: 9.5 % (ref 1–10)
NEUTROPHILS NFR BLD AUTO: 61.2 % (ref 45–75)
PLATELET # BLD: 235 K/UL (ref 150–450)
POTASSIUM SERPL-SCNC: 3.5 MMOL/L (ref 3.5–5.1)
RBC # BLD AUTO: 3.38 M/UL (ref 4.7–6.1)
SODIUM SERPL-SCNC: 145 MMOL/L (ref 136–145)
WBC # BLD AUTO: 8.4 K/UL (ref 4.8–10.8)

## 2019-09-07 RX ADMIN — HUMAN INSULIN SCH UNITS: 100 INJECTION, SUSPENSION SUBCUTANEOUS at 16:50

## 2019-09-07 RX ADMIN — METRONIDAZOLE SCH MG: 500 TABLET ORAL at 21:29

## 2019-09-07 RX ADMIN — METRONIDAZOLE SCH MG: 500 TABLET ORAL at 06:10

## 2019-09-07 RX ADMIN — ATORVASTATIN CALCIUM SCH MG: 20 TABLET, FILM COATED ORAL at 20:44

## 2019-09-07 RX ADMIN — CHLORHEXIDINE GLUCONATE SCH APPLIC: 213 SOLUTION TOPICAL at 20:44

## 2019-09-07 RX ADMIN — Medication SCH MLS/HR: at 02:03

## 2019-09-07 RX ADMIN — METRONIDAZOLE SCH MG: 500 TABLET ORAL at 13:38

## 2019-09-07 RX ADMIN — HUMAN INSULIN SCH UNITS: 100 INJECTION, SUSPENSION SUBCUTANEOUS at 06:14

## 2019-09-07 RX ADMIN — ENOXAPARIN SODIUM SCH MG: 40 INJECTION SUBCUTANEOUS at 20:46

## 2019-09-07 RX ADMIN — SODIUM CHLORIDE SCH MLS/HR: 0.9 INJECTION INTRAVENOUS at 15:05

## 2019-09-07 NOTE — NUR
NURSE NOTES:

pt has bleeding under the nail of R third toe. called Dr WAYNE, left massage, waiting to 
call back. RN cleansed the wound and covered. will continue to monitorin.

## 2019-09-07 NOTE — NUR
NURSE NOTES:

Vanco Trough results 14.4, spoke with pipeline pharmacist candice Brand to give next scheduled 
dose.

## 2019-09-07 NOTE — NUR
NURSE NOTES:

Received pt from [facility]. Pt is alert and orient x4. pt is in RA, no SOB or acute 
respiratory distress noted.  pt has intact iv access RFA 22g SL. Pt has PICC NATALI SL. Pt is 
eating breakfast independently. all needs attended, bed is locked and is in the lowest 
position. call light within easy reach. will continue to monitor

## 2019-09-07 NOTE — SURGERY PROGRESS NOTE
Surgery Progress Note


Subjective


Additional Comments


Patient seen and examined bedside.  No acute events.  Doing well.  Appreciate 

vascular input.  Labs noted.  Exam stable





Objective





Last 24 Hour Vital Signs








  Date Time  Temp Pulse Resp B/P (MAP) Pulse Ox O2 Delivery O2 Flow Rate FiO2


 


9/7/19 09:00      Room Air  


 


9/7/19 08:31  75  115/60    


 


9/7/19 08:00 97.7 75 18 115/60 (78) 98   


 


9/7/19 04:00 97.8 82 18 121/53 (75) 97   


 


9/7/19 00:00 98.0 93 19 128/62 (84) 95   


 


9/6/19 21:00      Room Air  


 


9/6/19 20:58  94  131/74    


 


9/6/19 20:00 98.2 94 19 131/74 (93) 98   


 


9/6/19 16:00 98.1 88 18 147/69 (95) 98   


 


9/6/19 12:00 97.9 62 19 127/79 (95) 96   








I&O











Intake and Output  


 


 9/6/19 9/7/19





 19:00 07:00


 


Intake Total 110 ml 4416.666 ml


 


Balance 110 ml 4416.666 ml


 


  


 


Intake Oral  650 ml


 


IV Total 110 ml 1366.666 ml


 


Other  2400 ml


 


# Bowel Movements  1








Dressing:  dry


Wound:  clean


Cardiovascular:  RSR


Respiratory:  clear


Abdomen:  soft, non-tender, present bowel sounds, non-distended


Extremities:  cyanosis, other





Laboratory Tests








Test


  9/7/19


01:10


 


White Blood Count


  8.4 K/UL


(4.8-10.8)


 


Red Blood Count


  3.38 M/UL


(4.70-6.10)  L


 


Hemoglobin


  9.8 G/DL


(14.2-18.0)  L


 


Hematocrit


  29.2 %


(42.0-52.0)  L


 


Mean Corpuscular Volume 86 FL (80-99)  


 


Mean Corpuscular Hemoglobin


  28.9 PG


(27.0-31.0)


 


Mean Corpuscular Hemoglobin


Concent 33.5 G/DL


(32.0-36.0)


 


Red Cell Distribution Width


  11.6 %


(11.6-14.8)


 


Platelet Count


  235 K/UL


(150-450)


 


Mean Platelet Volume


  6.3 FL


(6.5-10.1)  L


 


Neutrophils (%) (Auto)


  61.2 %


(45.0-75.0)


 


Lymphocytes (%) (Auto)


  25.5 %


(20.0-45.0)


 


Monocytes (%) (Auto)


  9.5 %


(1.0-10.0)


 


Eosinophils (%) (Auto)


  2.7 %


(0.0-3.0)


 


Basophils (%) (Auto)


  1.1 %


(0.0-2.0)


 


Sodium Level


  145 MMOL/L


(136-145)


 


Potassium Level


  3.5 MMOL/L


(3.5-5.1)


 


Chloride Level


  111 MMOL/L


()  H


 


Carbon Dioxide Level


  23 MMOL/L


(21-32)


 


Anion Gap


  11 mmol/L


(5-15)


 


Blood Urea Nitrogen


  20 mg/dL


(7-18)  H


 


Creatinine


  1.6 MG/DL


(0.55-1.30)  H


 


Estimat Glomerular Filtration


Rate  mL/min (>60)  


 


 


Glucose Level


  117 MG/DL


()  H


 


Calcium Level


  8.8 MG/DL


(8.5-10.1)


 


Vancomycin Level Trough


  14.4 ug/mL


(5.0-12.0)  H











Plan


Problems:  


(1) Elevated lactic acid level


Assessment & Plan:  Patient presented with lactic acidosis, cellulitis and 

infection of his left foot with necrosis and gangrene of the left second toe.


Continue with his resuscitation


Okay for diet


improving


stable 





(2) Gangrene of toe of left foot


Assessment & Plan:  Patient presents with left second ray gangrene dry foul-

smelling no purulent drainage edema and cellulitis of the foot.  Has had it for 

a few weeks and worsening since his trip to [place].


Plain films noted no fracture


IV antibiotics as per infectious disease


Pending venous and arterial studies as well





MRI: Impression: Abnormal STIR and T1 signal within the second distal phalanx, 

indicative


of osteomyelitis. Subtle increased STIR signal within the second middle phalanx


without corresponding T1 signal abnormality. This may reflect reactive changes 

or


very early osteomyelitis Dorsal edema. This may indicate cellulitis or may be 

vasogenic in origin





Swab necrotic area of left foot with Betadine wash foot daily apply gauze 

dressing


We will follow with recommendations as results of imaging are available


We will attempt to salvage as much possible but potentially may need an 

amputation


cont with IV abx and local wound care.  defer to ID for abx duration


Podiatry input appreciated


vascular clearance and likely amputation 


Strict glucose monitoring and control


Thank you will follow with recommendations





(3) DM (diabetes mellitus)











Raciel Meléndez Sep 7, 2019 10:19

## 2019-09-07 NOTE — GENERAL PROGRESS NOTE
Assessment/Plan


Problem List:  


(1) Hyperglycemia


ICD Codes:  R73.9 - Hyperglycemia, unspecified


SNOMED:  13839022


(2) Renal failure


ICD Codes:  N19 - Unspecified kidney failure


SNOMED:  26322735


Qualifiers:  


   Qualified Codes:  N19 - Unspecified kidney failure


(3) Elevated lactic acid level


ICD Codes:  R79.89 - Other specified abnormal findings of blood chemistry


SNOMED:  2604337


(4) Gangrene of toe of left foot


ICD Codes:  I96 - Gangrene, not elsewhere classified


SNOMED:  59657534518810365


(5) DM (diabetes mellitus)


ICD Codes:  E11.9 - Type 2 diabetes mellitus without complications


SNOMED:  68162868


Status:  stable


Assessment/Plan:


74 y/o male admitted with L second toe foul smelling necrosis, gangrene and 

osteomyelitis. 





# L second toe distal dry gangrene/ osteomyelitis shown on MRI foot. Vascular 

studies show patent arteries. 


# Diabetic foot ulcer


Cefepime and Vancomycin- renal dose, and Flagyl. day 6. wound cultures with 

klebsiella and staph aureus 


ESR and CRP weekly


ASA and statin


Surgical consult with Dr. Meléndez appreciated 


PICC placed 


ID consult to guide antibiotic coverage appreciated 


Podiatry consulted, Dr. Burrows, favoring amputation. 


Per vascular surgery, Dr. Brownlee, patient needs selective left leg angio 

prior to amputation. Will transfer to [facility] vs. [facility]   


 consult for transfer. [facility] contacted. Waiting for financial approval from 

insurance. 








 


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 


Patient denies prior renal disease and nephrology was consulted. Dr. Torres


IVF and monitor BMP


UA with microalbumin


Renal US reviewed 


MRI abdomen without contrast to work up bladder nodule seen on US 


continue to hold Losartan, will resume on discharge 





# IDDM- poorly controlled- HbA1c 10.4


finger stick glucose has been less than 200 on NPH 40 AM, 20 PM 


diabetic diet 





# Disposition


PT evaluation for possible need of assistive devices.


Would go home with home services 





FULL CODE


DVT and GI ppx





I spent 40 minutes on this encounter, 50% on counselling and care coordination





Subjective


Date patient seen:  Sep 7, 2019


ROS Limited/Unobtainable:  No


Constitutional:  Denies: no symptoms, chills, diaphoresis, fever, malaise, 

weakness, other


HEENT:  Denies: no symptoms, eye pain, blurred vision, tearing, double vision, 

ear pain, ear discharge, nose pain, nose congestion, throat pain, throat 

swelling, mouth pain, mouth swelling, other


Cardiovascular:  Denies: no symptoms, chest pain, edema, irregular heart rate, 

lightheadedness, palpitations, syncope, other


Respiratory:  Denies: no symptoms, cough, orthopnea, shortness of breath, SOB 

with excertion, SOB at rest, sputum, stridor, wheezing, other


Gastrointestinal/Abdominal:  Denies: no symptoms, abdomen distended, abdominal 

pain, black stools, tarry stools, blood in stool, constipated, diarrhea, 

difficulty swallowing, nausea, poor appetite, poor fluid intake, rectal bleeding

, vomiting, other


Genitourinary:  Denies: no symptoms, burning, discharge, frequency, flank pain, 

hematuria, incontinence, pain, urgency, other


Neurologic/Psychiatric:  Denies: no symptoms, anxiety, depressed, emotional 

problems, headache, numbness, paresthesia, pre-existing deficit, seizure, 

tingling, tremors, weakness, other


Endocrine:  Denies: no symptoms, excessive sweating, flushing, intolerance to 

cold, intolerance to heat, increased hunger, increased thirst, increased urine, 

unexplained weight gain, unexplained weight loss, other


Hematologic/Lymphatic:  Denies: no symptoms, anemia, easy bleeding, easy 

bruising, other


Allergies:  


Coded Allergies:  


     No Known Allergies (Unverified , 6/2/16)


Subjective


Seen and examined. 


no abdominal pain, no diarrhea 


evaluated by podiatry, favors amputation.Per vascular surgery, Dr. Brownlee

, patient needs a selective left leg angio now waiting to transfer to [facility], 

pending insurance clearance 


Minor bleeding noted from right leg, under one of digits





Objective





Last 24 Hour Vital Signs








  Date Time  Temp Pulse Resp B/P (MAP) Pulse Ox O2 Delivery O2 Flow Rate FiO2


 


9/7/19 12:00 98.1 79 16 133/68 (89) 99   


 


9/7/19 09:00      Room Air  


 


9/7/19 08:31  75  115/60    


 


9/7/19 08:00 97.7 75 18 115/60 (78) 98   


 


9/7/19 04:00 97.8 82 18 121/53 (75) 97   


 


9/7/19 00:00 98.0 93 19 128/62 (84) 95   


 


9/6/19 21:00      Room Air  


 


9/6/19 20:58  94  131/74    


 


9/6/19 20:00 98.2 94 19 131/74 (93) 98   


 


9/6/19 16:00 98.1 88 18 147/69 (95) 98   

















Intake and Output  


 


 9/6/19 9/7/19





 18:59 06:59


 


Intake Total 110 ml 4416.666 ml


 


Balance 110 ml 4416.666 ml


 


  


 


Intake Oral  650 ml


 


IV Total 110 ml 1366.666 ml


 


Other  2400 ml


 


# Bowel Movements  1








Laboratory Tests


9/7/19 01:10: 


White Blood Count 8.4, Red Blood Count 3.38L, Hemoglobin 9.8L, Hematocrit 29.2L

, Mean Corpuscular Volume 86, Mean Corpuscular Hemoglobin 28.9, Mean 

Corpuscular Hemoglobin Concent 33.5, Red Cell Distribution Width 11.6, Platelet 

Count 235, Mean Platelet Volume 6.3L, Neutrophils (%) (Auto) 61.2, Lymphocytes (

%) (Auto) 25.5, Monocytes (%) (Auto) 9.5, Eosinophils (%) (Auto) 2.7, Basophils 

(%) (Auto) 1.1, Sodium Level 145, Potassium Level 3.5, Chloride Level 111H, 

Carbon Dioxide Level 23, Anion Gap 11, Blood Urea Nitrogen 20H, Creatinine 1.6H

, Estimat Glomerular Filtration Rate , Glucose Level 117H, Calcium Level 8.8, 

Vancomycin Level Trough 14.4H


Height (Feet):  5


Height (Inches):  6.00


Weight (Pounds):  181


Objective


General appearance:  alert, cooperative, no distress, appears stated age


Head:  Normocephalic, without obvious abnormality, atraumatic


Eyes:  conjunctivae/corneas clear. PERRL, EOM's intact. Fundi benign


Throat:  Lips, mucosa, and tongue normal. Teeth and gums normal


Neck:  supple, symmetrical, trachea midline, no adenopathy, thyroid: not 

enlarged, symmetric, no tenderness/mass/nodules, no carotid bruit and no JVD


Lungs:  clear to auscultation bilaterally


Heart:  regular rate and rhythm, S1, S2 normal, no murmur, click, rub or gallop


Abdomen:  soft, non-tender. Bowel sounds normal. No masses,  no organomegaly


Extremities:  extremities normal, atraumatic, necrotic left second toe. foul 

odor. no drainage . +picc present. clean and clear no erythema


Pulses:  2+ and symmetric


Skin:  Skin color, texture, turgor normal. No rashes or lesions


Neurologic:  Grossly normal











Guillermo Tompkins M.D. Sep 7, 2019 13:40

## 2019-09-07 NOTE — NUR
HAND-OFF: 

Report given to BRANDON Jose. Endorsed that [facility] called and stated waiting 
on Authorization from [facility]

## 2019-09-07 NOTE — NUR
PT Note

PT goals achieved; will DC physical therapy.

-------------------------------------------------------------------------------

Addendum: 09/07/19 at 1439 by EMMY ERICKSON PT

-------------------------------------------------------------------------------

Amended: Links added.

## 2019-09-07 NOTE — NUR
NURSE NOTES:

Radha from [facility] called stating Authorization is needed from [facility] 
insurance and the finance department is working on it.

## 2019-09-07 NOTE — NUR
NURSE NOTES:

Pt received in bed, awake alert and oriented and pleasant, no signs of distress or c/o pain, 
bed in lowest position, able to make needs known, call light within reach, will continue to 
monitor.

## 2019-09-08 VITALS — DIASTOLIC BLOOD PRESSURE: 63 MMHG | SYSTOLIC BLOOD PRESSURE: 117 MMHG

## 2019-09-08 VITALS — SYSTOLIC BLOOD PRESSURE: 134 MMHG | DIASTOLIC BLOOD PRESSURE: 70 MMHG

## 2019-09-08 VITALS — SYSTOLIC BLOOD PRESSURE: 144 MMHG | DIASTOLIC BLOOD PRESSURE: 77 MMHG

## 2019-09-08 VITALS — SYSTOLIC BLOOD PRESSURE: 127 MMHG | DIASTOLIC BLOOD PRESSURE: 70 MMHG

## 2019-09-08 VITALS — SYSTOLIC BLOOD PRESSURE: 139 MMHG | DIASTOLIC BLOOD PRESSURE: 75 MMHG

## 2019-09-08 VITALS — DIASTOLIC BLOOD PRESSURE: 62 MMHG | SYSTOLIC BLOOD PRESSURE: 125 MMHG

## 2019-09-08 RX ADMIN — HUMAN INSULIN SCH UNITS: 100 INJECTION, SUSPENSION SUBCUTANEOUS at 06:05

## 2019-09-08 RX ADMIN — HUMAN INSULIN SCH UNITS: 100 INJECTION, SUSPENSION SUBCUTANEOUS at 16:34

## 2019-09-08 RX ADMIN — METRONIDAZOLE SCH MG: 500 TABLET ORAL at 13:12

## 2019-09-08 RX ADMIN — METRONIDAZOLE SCH MG: 500 TABLET ORAL at 06:03

## 2019-09-08 RX ADMIN — ATORVASTATIN CALCIUM SCH MG: 20 TABLET, FILM COATED ORAL at 20:38

## 2019-09-08 RX ADMIN — CHLORHEXIDINE GLUCONATE SCH APPLIC: 213 SOLUTION TOPICAL at 20:38

## 2019-09-08 RX ADMIN — SODIUM CHLORIDE SCH MLS/HR: 0.9 INJECTION INTRAVENOUS at 15:21

## 2019-09-08 RX ADMIN — Medication SCH MLS/HR: at 02:08

## 2019-09-08 RX ADMIN — METRONIDAZOLE SCH MG: 500 TABLET ORAL at 21:11

## 2019-09-08 RX ADMIN — ASPIRIN 81 MG SCH MG: 81 TABLET ORAL at 09:01

## 2019-09-08 RX ADMIN — ENOXAPARIN SODIUM SCH MG: 40 INJECTION SUBCUTANEOUS at 20:40

## 2019-09-08 NOTE — SURGERY PROGRESS NOTE
Surgery Progress Note


Subjective


Additional Comments


no acute events





Objective





Last 24 Hour Vital Signs








  Date Time  Temp Pulse Resp B/P (MAP) Pulse Ox O2 Delivery O2 Flow Rate FiO2


 


9/8/19 08:24  84  131/65    


 


9/8/19 08:00 97.4 82 18 127/70 (89) 99   


 


9/8/19 04:00 98.3 83 18 125/62 (83) 96   


 


9/8/19 00:00 97.5 60 18 117/63 (81) 98   


 


9/7/19 21:00      Room Air  


 


9/7/19 20:45  95  125/57    


 


9/7/19 20:00 98.9 95 18 125/57 (79) 97   


 


9/7/19 16:00 98.7 91 19 129/47 (74) 98   


 


9/7/19 12:00 98.1 79 16 133/68 (89) 99   








I&O











Intake and Output  


 


 9/7/19 9/8/19





 19:00 07:00


 


Intake Total 350 ml 2806.666 ml


 


Balance 350 ml 2806.666 ml


 


  


 


Intake Oral 240 ml 240 ml


 


IV Total 110 ml 1366.666 ml


 


Other  1200 ml


 


# Voids 1 1


 


# Bowel Movements  1








Dressing:  dry


Wound:  clean


Cardiovascular:  RSR


Respiratory:  clear


Abdomen:  soft, non-tender, present bowel sounds


Extremities:  cyanosis, pulses, other





Plan


Problems:  


(1) Elevated lactic acid level


Assessment & Plan:  Patient presented with lactic acidosis, cellulitis and 

infection of his left foot with necrosis and gangrene of the left second toe.


Continue with his resuscitation


Okay for diet


improving


stable 





(2) Gangrene of toe of left foot


Assessment & Plan:  Patient presents with left second ray gangrene dry foul-

smelling no purulent drainage edema and cellulitis of the foot.  Has had it for 

a few weeks and worsening since his trip to [place].


Plain films noted no fracture


IV antibiotics as per infectious disease


Pending venous and arterial studies as well





MRI: Impression: Abnormal STIR and T1 signal within the second distal phalanx, 

indicative


of osteomyelitis. Subtle increased STIR signal within the second middle phalanx


without corresponding T1 signal abnormality. This may reflect reactive changes 

or


very early osteomyelitis Dorsal edema. This may indicate cellulitis or may be 

vasogenic in origin





Swab necrotic area of left foot with Betadine wash foot daily apply gauze 

dressing


We will follow with recommendations as results of imaging are available


We will attempt to salvage as much possible but potentially may need an 

amputation


cont with IV abx and local wound care.  defer to ID for abx duration


Podiatry input appreciated


vascular clearance and likely amputation 


Strict glucose monitoring and control


Thank you will follow with recommendations





(3) DM (diabetes mellitus)











Raciel Meléndez Sep 8, 2019 09:56

## 2019-09-08 NOTE — NUR
NURSE NOTES:

Pt received in bed alert and oriented, no c/o pain or signs of distress, able to make needs 
known, call light within reach, head of bed elevated, will continue to monitor.

## 2019-09-08 NOTE — NUR
NURSE NOTES:

changed dressing of the left 2nd toe. No secretion noted, foul odor, gangrenous black 
indurated area. Painted with betadine, placed a folded dry 4x4 gauze and secured with tape.

## 2019-09-08 NOTE — NUR
NURSE NOTES:

received pt from NOC RN AMBER Loomis. Pt is awake, sitting on chair, no complaint of pain or 
discomfort. RFA IV access saline locked. Bed locked at the lowest position possible, call 
light within easy reach. Will continue to monitor pt and follow up with the plan of care.

## 2019-09-08 NOTE — INFECTIOUS DISEASES PROG NOTE
Assessment/Plan


Assessment/Plan








ASSESSMENT AND PLAN:





1. left foot 2nd toe gangrene with osteomyelitis/cellulitis and infected wound, 

wc - staph aureus and klebsiella 





   - continue vancomycin, cefepime and flagyl - day # 8 abx 


   - monitor labs


   - plan on transfer to higher level of care, podiatry and vascular surgery f/u


   


2. Anemia.


3. Elevated creatinine.


4. Diabetes.


5. Hypertension.


6. Blood sugar and blood pressure treatment per primary.


7. BPH.


8. No known allergies.


9. Social history is negative.


10. Family history is noncontributory.


11. MAR was noted.


12. Case was discussed with RN.


13. Continue treatment per primary consultants.





Subjective


Constitutional:  Denies: fever


HEENT:  Denies: congestion


Respiratory:  Denies: shortness of breath


Cardiovascular:  Denies: chest pain


Gastrointestinal/Abdominal:  Denies: nausea, vomiting, diarrhea


Genitourinary:  Reports: other - no benjamin ; Denies: dysuria


Neurologic:  Denies: headache


Psychiatric:  Denies: depression


Skin:  Denies: rash


Hematologic:  Denies: bleeding


Musculoskeletal:  Denies: pain


Allergies:  


Coded Allergies:  


     No Known Allergies (Unverified , 6/2/16)





Objective


Vital Signs





Last 24 Hour Vital Signs








  Date Time  Temp Pulse Resp B/P (MAP) Pulse Ox O2 Delivery O2 Flow Rate FiO2


 


9/8/19 12:00 97.3 85 19 134/70 (91) 97   


 


9/8/19 09:00      Room Air  


 


9/8/19 08:24  84  131/65    


 


9/8/19 08:00 97.4 82 18 127/70 (89) 99   


 


9/8/19 04:00 98.3 83 18 125/62 (83) 96   


 


9/8/19 00:00 97.5 60 18 117/63 (81) 98   


 


9/7/19 21:00      Room Air  


 


9/7/19 20:45  95  125/57    


 


9/7/19 20:00 98.9 95 18 125/57 (79) 97   


 


9/7/19 16:00 98.7 91 19 129/47 (74) 98   








Height (Feet):  5


Height (Inches):  6.00


Weight (Pounds):  181


General Appearance:  no acute distress


HEENT:  normocephalic, atraumatic, anicteric, mucous membranes moist


Respiratory/Chest:  lungs clear, normal breath sounds, no respiratory distress


Cardiovascular:  normal rate, regular rhythm, no gallop/murmur, no JVD


Abdomen:  normal bowel sounds, soft, non tender, no organomegaly, non distended


Genitourinary:  other - no benjamin


Extremities:  other - left foot 2nd toe gangrene


Skin:  no rash


Neurologic/Psychiatric:  CNs II-XII grossly normal, alert, oriented x 3, 

responsive


Lymphatic:  no neck adenopathy


Musculoskeletal:  no effusion


Objective





Left foot MRI:





Impression: Abnormal STIR and T1 signal within the second distal phalanx, 

indicative


of osteomyelitis. Subtle increased STIR signal within the second middle phalanx


without corresponding T1 signal abnormality. This may reflect reactive changes 

or


very early osteomyelitis


 


Dorsal edema. This may indicate cellulitis or may be vasogenic in origin





Microbiology








 Date/Time


Source Procedure


Growth Status


 


 


 8/30/19 17:00


Blood Blood Culture - Final


NO GROWTH AFTER 5 DAYS Complete





 8/30/19 16:33


Foot Left Gram Stain - Final Complete


 


 8/30/19 16:33 Wound Culture - Final


Klebsiella Oxytoca


Staphylococcus Aureus


Diphtheroids Complete











Labs








Test


  9/6/19


05:30 9/7/19


01:10


 


White Blood Count


  7.8 K/UL


(4.8-10.8) 8.4 K/UL


(4.8-10.8)


 


Red Blood Count


  3.29 M/UL


(4.70-6.10) 3.38 M/UL


(4.70-6.10)


 


Hemoglobin


  9.5 G/DL


(14.2-18.0) 9.8 G/DL


(14.2-18.0)


 


Hematocrit


  28.6 %


(42.0-52.0) 29.2 %


(42.0-52.0)


 


Mean Corpuscular Volume 87 FL (80-99)  86 FL (80-99) 


 


Mean Corpuscular Hemoglobin


  28.8 PG


(27.0-31.0) 28.9 PG


(27.0-31.0)


 


Mean Corpuscular Hemoglobin


Concent 33.2 G/DL


(32.0-36.0) 33.5 G/DL


(32.0-36.0)


 


Red Cell Distribution Width


  11.2 %


(11.6-14.8) 11.6 %


(11.6-14.8)


 


Platelet Count


  231 K/UL


(150-450) 235 K/UL


(150-450)


 


Mean Platelet Volume


  6.0 FL


(6.5-10.1) 6.3 FL


(6.5-10.1)


 


Neutrophils (%) (Auto)


  61.3 %


(45.0-75.0) 61.2 %


(45.0-75.0)


 


Lymphocytes (%) (Auto)


  26.2 %


(20.0-45.0) 25.5 %


(20.0-45.0)


 


Monocytes (%) (Auto)


  9.0 %


(1.0-10.0) 9.5 %


(1.0-10.0)


 


Eosinophils (%) (Auto)


  2.6 %


(0.0-3.0) 2.7 %


(0.0-3.0)


 


Basophils (%) (Auto)


  1.0 %


(0.0-2.0) 1.1 %


(0.0-2.0)


 


Sodium Level


  140 MMOL/L


(136-145) 145 MMOL/L


(136-145)


 


Potassium Level


  3.9 MMOL/L


(3.5-5.1) 3.5 MMOL/L


(3.5-5.1)


 


Chloride Level


  109 MMOL/L


() 111 MMOL/L


()


 


Carbon Dioxide Level


  22 MMOL/L


(21-32) 23 MMOL/L


(21-32)


 


Anion Gap


  9 mmol/L


(5-15) 11 mmol/L


(5-15)


 


Blood Urea Nitrogen


  20 mg/dL


(7-18) 20 mg/dL


(7-18)


 


Creatinine


  1.5 MG/DL


(0.55-1.30) 1.6 MG/DL


(0.55-1.30)


 


Estimat Glomerular Filtration


Rate  mL/min (>60) 


   mL/min (>60) 


 


 


Glucose Level


  189 MG/DL


() 117 MG/DL


()


 


Calcium Level


  8.5 MG/DL


(8.5-10.1) 8.8 MG/DL


(8.5-10.1)


 


Total Bilirubin


  0.2 MG/DL


(0.2-1.0) 


 


 


Aspartate Amino Transf


(AST/SGOT) 47 U/L (15-37) 


  


 


 


Alanine Aminotransferase


(ALT/SGPT) 43 U/L (12-78) 


  


 


 


Alkaline Phosphatase


  75 U/L


() 


 


 


Total Protein


  6.7 G/DL


(6.4-8.2) 


 


 


Albumin


  2.4 G/DL


(3.4-5.0) 


 


 


Globulin 4.3 g/dL  


 


Albumin/Globulin Ratio 0.6 (1.0-2.7)  


 


Vancomycin Level Trough


  


  14.4 ug/mL


(5.0-12.0)











Current Medications








 Medications


  (Trade)  Dose


 Ordered  Sig/Sherly


 Route


 PRN Reason  Start Time


 Stop Time Status Last Admin


Dose Admin


 


 Acetaminophen


  (Tylenol)  650 mg  Q4H  PRN


 ORAL


 Mild Pain (Pain Scale 1-3)  8/30/19 18:30


 9/29/19 18:29  9/7/19 04:22


 


 


 Amlodipine


 Besylate


  (Norvasc)  5 mg  DAILY


 ORAL


   9/2/19 09:00


 10/2/19 08:59  9/8/19 08:24


 


 


 Aspirin


  (ASA)  81 mg  DAILY


 ORAL


   9/8/19 09:00


 10/8/19 08:59  9/8/19 09:01


 


 


 Atorvastatin


 Calcium


  (Lipitor)  20 mg  BEDTIME


 ORAL


   9/7/19 21:00


 10/7/19 20:59  9/7/19 20:44


 


 


 Cefepime HCl 2 gm/


 Dextrose  110 ml @ 


 220 mls/hr  Q24H


 IV


   9/4/19 16:00


 9/11/19 15:59  9/7/19 15:05


 


 


 Chlorhexidine


 Gluconate


  (Nickie-Hex 2%)  1 applic  DAILY@2000


 TOPIC


   9/3/19 20:00


 10/3/19 19:59  9/7/19 20:44


 


 


 Dextrose


  (Dextrose 50%)  25 ml  Q30M  PRN


 IV


 Hypoglycemia  8/30/19 18:30


 9/29/19 18:29   


 


 


 Dextrose


  (Dextrose 50%)  50 ml  Q30M  PRN


 IV


 Hypoglycemia  8/30/19 18:30


 9/29/19 18:29   


 


 


 Enoxaparin Sodium


  (Lovenox)  40 mg  QHS


 SUBQ


   9/4/19 21:00


 10/4/19 20:59  9/7/19 20:46


 


 


 Insulin Human NPH


  (Humulin N)  20 units  BEFORE  DINNER


 SUBQ


   8/31/19 16:30


 9/30/19 16:29  9/7/19 16:50


 


 


 Insulin Human NPH


  (Humulin N)  40 units  BEFORE  BREAKFAST


 SUBQ


   8/31/19 06:30


 9/30/19 06:29  9/8/19 06:05


 


 


 Metoprolol


 Tartrate


  (Lopressor)  100 mg  BEDTIME


 ORAL


   8/31/19 21:00


 9/30/19 20:59  9/7/19 20:45


 


 


 Metronidazole


  (Flagyl)  500 mg  EVERY 8  HOURS


 ORAL


   9/4/19 14:00


 9/11/19 13:59  9/8/19 13:12


 


 


 Ondansetron HCl


  (Zofran)  4 mg  Q6H  PRN


 IVP


 Nausea & Vomiting  9/8/19 12:15


 10/8/19 12:14  9/8/19 13:12


 


 


 Pantoprazole


  (Protonix)  40 mg  DAILY


 ORAL


   8/31/19 09:00


 9/30/19 08:59  9/8/19 08:22


 


 


 Polyethylene


 Glycol


  (Miralax)  17 gm  HSPRN  PRN


 ORAL


 Constipation  8/30/19 18:30


 9/29/19 18:29   


 


 


 Vancomycin HCl


  (Vanco rx to


 dose)  1 ea  DAILY  PRN


 MISC


 Per rx protocol  8/30/19 18:30


 9/29/19 18:29   


 


 


 Vancomycin/Sodium


 Chloride  275 ml @ 


 183.333


 mls/hr  Q24H


 IVPB


   9/5/19 02:00


 9/10/19 01:59  9/8/19 02:08


 

















Eve Milan MD Sep 8, 2019 15:06

## 2019-09-08 NOTE — GENERAL PROGRESS NOTE
Assessment/Plan


Problem List:  


(1) Gangrene of toe of left foot


ICD Codes:  I96 - Gangrene, not elsewhere classified


SNOMED:  50585497988593530


(2) Hyperglycemia


ICD Codes:  R73.9 - Hyperglycemia, unspecified


SNOMED:  21512468


(3) Renal failure


ICD Codes:  N19 - Unspecified kidney failure


SNOMED:  65196591


Qualifiers:  


   Qualified Codes:  N19 - Unspecified kidney failure


(4) Elevated lactic acid level


ICD Codes:  R79.89 - Other specified abnormal findings of blood chemistry


SNOMED:  7961251


(5) DM (diabetes mellitus)


ICD Codes:  E11.9 - Type 2 diabetes mellitus without complications


SNOMED:  97595203


Status:  stable


Assessment/Plan:


74 y/o male admitted with L second toe foul smelling necrosis, gangrene and 

osteomyelitis. 





# L second toe distal dry gangrene/ osteomyelitis shown on MRI foot. Vascular 

studies show patent arteries. 


# Diabetic foot ulcer


Cefepime and Vancomycin- renal dose, and Flagyl. day 8. wound cultures with 

klebsiella and staph aureus 


ESR and CRP weekly


ASA and statin


Surgical consult with Dr. Meléndez appreciated 


PICC placed 


ID consult to guide antibiotic coverage appreciated 


Podiatry consulted, Dr. Burrows, favoring amputation. 


Per vascular surgery, Dr. Brownlee, patient needs selective left leg angio 

prior to amputation. Will transfer to [facility] vs. [facility]   


 consult for transfer. [facility] contacted. Waiting for financial approval from 

insurance. 








 


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 


Patient denies prior renal disease and nephrology was consulted. Dr. Torres


IVF and monitor BMP


UA with microalbumin


Renal US reviewed 


MRI abdomen without contrast to work up bladder nodule seen on US 


continue to hold Losartan, will resume on discharge 





# IDDM- poorly controlled- HbA1c 10.4


finger stick glucose has been less than 200 on NPH 40 AM, 20 PM 


diabetic diet 





#Nausea, probably from Metronidazole. Add Zofran prn 





# Disposition


PT evaluation for possible need of assistive devices.


Would go home with home services 





FULL CODE


DVT and GI ppx





I spent 40 minutes on this encounter, 50% on counselling and care coordination





Subjective


Date patient seen:  Sep 8, 2019


ROS Limited/Unobtainable:  No


Constitutional:  Denies: no symptoms, chills, diaphoresis, fever, malaise, 

weakness, other


HEENT:  Denies: no symptoms, eye pain, blurred vision, tearing, double vision, 

ear pain, ear discharge, nose pain, nose congestion, throat pain, throat 

swelling, mouth pain, mouth swelling, other


Cardiovascular:  Denies: no symptoms, chest pain, edema, irregular heart rate, 

lightheadedness, palpitations, syncope, other


Respiratory:  Denies: no symptoms, cough, orthopnea, shortness of breath, SOB 

with excertion, SOB at rest, sputum, stridor, wheezing, other


Gastrointestinal/Abdominal:  Reports: nausea; Denies: no symptoms, abdomen 

distended, abdominal pain, black stools, tarry stools, blood in stool, 

constipated, diarrhea, difficulty swallowing, poor appetite, poor fluid intake, 

rectal bleeding, vomiting, other


Genitourinary:  Denies: no symptoms, burning, discharge, frequency, flank pain, 

hematuria, incontinence, pain, urgency, other


Neurologic/Psychiatric:  Denies: no symptoms, anxiety, depressed, emotional 

problems, headache, numbness, paresthesia, pre-existing deficit, seizure, 

tingling, tremors, weakness, other


Endocrine:  Denies: no symptoms, excessive sweating, flushing, intolerance to 

cold, intolerance to heat, increased hunger, increased thirst, increased urine, 

unexplained weight gain, unexplained weight loss, other


Hematologic/Lymphatic:  Denies: no symptoms, anemia, easy bleeding, easy 

bruising, other


Allergies:  


Coded Allergies:  


     No Known Allergies (Unverified , 6/2/16)


Subjective


Seen and examined. 


no abdominal pain, no diarrhea 


evaluated by podiatry, favors amputation.Per vascular surgery, Dr. Brownlee

, patient needs a selective left leg angio now waiting to transfer to [facility], 

pending insurance clearance 


c/o nausea today, no abdominal pain or diarrhea





Objective





Last 24 Hour Vital Signs








  Date Time  Temp Pulse Resp B/P (MAP) Pulse Ox O2 Delivery O2 Flow Rate FiO2


 


9/8/19 09:00      Room Air  


 


9/8/19 08:24  84  131/65    


 


9/8/19 08:00 97.4 82 18 127/70 (89) 99   


 


9/8/19 04:00 98.3 83 18 125/62 (83) 96   


 


9/8/19 00:00 97.5 60 18 117/63 (81) 98   


 


9/7/19 21:00      Room Air  


 


9/7/19 20:45  95  125/57    


 


9/7/19 20:00 98.9 95 18 125/57 (79) 97   


 


9/7/19 16:00 98.7 91 19 129/47 (74) 98   


 


9/7/19 12:00 98.1 79 16 133/68 (89) 99   

















Intake and Output  


 


 9/7/19 9/8/19





 19:00 07:00


 


Intake Total 350 ml 2806.666 ml


 


Balance 350 ml 2806.666 ml


 


  


 


Intake Oral 240 ml 240 ml


 


IV Total 110 ml 1366.666 ml


 


Other  1200 ml


 


# Voids 1 1


 


# Bowel Movements  1








Height (Feet):  5


Height (Inches):  6.00


Weight (Pounds):  181


Objective


General appearance:  alert, cooperative, no distress, appears stated age


Head:  Normocephalic, without obvious abnormality, atraumatic


Eyes:  conjunctivae/corneas clear. PERRL, EOM's intact. Fundi benign


Throat:  Lips, mucosa, and tongue normal. Teeth and gums normal


Neck:  supple, symmetrical, trachea midline, no adenopathy, thyroid: not 

enlarged, symmetric, no tenderness/mass/nodules, no carotid bruit and no JVD


Lungs:  clear to auscultation bilaterally


Heart:  regular rate and rhythm, S1, S2 normal, no murmur, click, rub or gallop


Abdomen:  soft, non-tender. Bowel sounds normal. No masses,  no organomegaly


Extremities:  extremities normal, atraumatic, necrotic left second toe. foul 

odor. no drainage . +picc present. clean and clear no erythema


Pulses:  2+ and symmetric


Skin:  Skin color, texture, turgor normal. No rashes or lesions


Neurologic:  Grossly normal











Guillermo Tompkins M.D. Sep 8, 2019 11:11

## 2019-09-08 NOTE — NEPHROLOGY PROGRESS NOTE
Assessment/Plan


Problem List:  


(1) Hyperglycemia


(2) Elevated lactic acid level


(3) Gangrene of toe of left foot


(4) Renal failure


(5) Dehydration


(6) HTN (hypertension)


(7) DM (diabetes mellitus)


Plan


# Acute kidney injury on possibley CKD stage III- likely diabetic nephropathy


# L second toe distal dry gangrene- with osteol


# IDDM


# HTN- controlled


# Diabetic foot ulcer





- hold losartan for now- ok to resume on DC-


- needs outpatient follow up with renal 


- continue to monitor off IVF


- continue metop 100 at night


- continue amlodipine 5mg daily 


- check renal US--> 





IMPRESSION:     


1.  No hydronephrosis.


2.  Nodular lesion arising from the prostate or bladder base, measures 1.


8 x 1.2 cm





mri pelvic to better delineate lesion --> Indentation of the bladder floor 














- continue abx


- avoid supratherapeutic vanco level


- podiatry/surgery eval noted 


 - patient needs selective left leg angio prior to amputation





Subjective


Subjective


no significant pain


No fevers or chills. 


mri foot done


pelvis mri done showing Indentation of the bladder floor 


s/p PICC line placed


Cr stable


 patient needs selective left leg angio prior to amputation





Objective


Objective





Last 24 Hour Vital Signs








  Date Time  Temp Pulse Resp B/P (MAP) Pulse Ox O2 Delivery O2 Flow Rate FiO2


 


9/8/19 12:00 97.3 85 19 134/70 (91) 97   


 


9/8/19 09:00      Room Air  


 


9/8/19 08:24  84  131/65    


 


9/8/19 08:00 97.4 82 18 127/70 (89) 99   


 


9/8/19 04:00 98.3 83 18 125/62 (83) 96   


 


9/8/19 00:00 97.5 60 18 117/63 (81) 98   


 


9/7/19 21:00      Room Air  


 


9/7/19 20:45  95  125/57    


 


9/7/19 20:00 98.9 95 18 125/57 (79) 97   


 


9/7/19 16:00 98.7 91 19 129/47 (74) 98   

















Intake and Output  


 


 9/7/19 9/8/19





 19:00 07:00


 


Intake Total 350 ml 2806.666 ml


 


Balance 350 ml 2806.666 ml


 


  


 


Intake Oral 240 ml 240 ml


 


IV Total 110 ml 1366.666 ml


 


Other  1200 ml


 


# Voids 1 1


 


# Bowel Movements  1








Height (Feet):  5


Height (Inches):  6.00


Weight (Pounds):  181











Kyler Torres M.D. Sep 8, 2019 14:04

## 2019-09-09 VITALS — SYSTOLIC BLOOD PRESSURE: 138 MMHG | DIASTOLIC BLOOD PRESSURE: 70 MMHG

## 2019-09-09 VITALS — DIASTOLIC BLOOD PRESSURE: 80 MMHG | SYSTOLIC BLOOD PRESSURE: 131 MMHG

## 2019-09-09 VITALS — DIASTOLIC BLOOD PRESSURE: 65 MMHG | SYSTOLIC BLOOD PRESSURE: 115 MMHG

## 2019-09-09 VITALS — SYSTOLIC BLOOD PRESSURE: 140 MMHG | DIASTOLIC BLOOD PRESSURE: 72 MMHG

## 2019-09-09 VITALS — DIASTOLIC BLOOD PRESSURE: 75 MMHG | SYSTOLIC BLOOD PRESSURE: 130 MMHG

## 2019-09-09 VITALS — SYSTOLIC BLOOD PRESSURE: 145 MMHG | DIASTOLIC BLOOD PRESSURE: 74 MMHG

## 2019-09-09 LAB
ADD MANUAL DIFF: NO
ANION GAP SERPL CALC-SCNC: 10 MMOL/L (ref 5–15)
BASOPHILS NFR BLD AUTO: 0.9 % (ref 0–2)
BUN SERPL-MCNC: 12 MG/DL (ref 7–18)
CALCIUM SERPL-MCNC: 8.9 MG/DL (ref 8.5–10.1)
CHLORIDE SERPL-SCNC: 112 MMOL/L (ref 98–107)
CO2 SERPL-SCNC: 22 MMOL/L (ref 21–32)
CREAT SERPL-MCNC: 1.5 MG/DL (ref 0.55–1.3)
EOSINOPHIL NFR BLD AUTO: 2.6 % (ref 0–3)
ERYTHROCYTE [DISTWIDTH] IN BLOOD BY AUTOMATED COUNT: 11.9 % (ref 11.6–14.8)
HCT VFR BLD CALC: 30 % (ref 42–52)
HGB BLD-MCNC: 10.2 G/DL (ref 14.2–18)
LYMPHOCYTES NFR BLD AUTO: 26.5 % (ref 20–45)
MCV RBC AUTO: 86 FL (ref 80–99)
MONOCYTES NFR BLD AUTO: 10.5 % (ref 1–10)
NEUTROPHILS NFR BLD AUTO: 59.5 % (ref 45–75)
PLATELET # BLD: 235 K/UL (ref 150–450)
POTASSIUM SERPL-SCNC: 3.7 MMOL/L (ref 3.5–5.1)
RBC # BLD AUTO: 3.49 M/UL (ref 4.7–6.1)
SODIUM SERPL-SCNC: 144 MMOL/L (ref 136–145)
WBC # BLD AUTO: 8.3 K/UL (ref 4.8–10.8)

## 2019-09-09 RX ADMIN — METRONIDAZOLE SCH MG: 500 TABLET ORAL at 22:05

## 2019-09-09 RX ADMIN — HUMAN INSULIN SCH UNITS: 100 INJECTION, SUSPENSION SUBCUTANEOUS at 17:48

## 2019-09-09 RX ADMIN — METRONIDAZOLE SCH MG: 500 TABLET ORAL at 05:35

## 2019-09-09 RX ADMIN — HUMAN INSULIN SCH UNITS: 100 INJECTION, SUSPENSION SUBCUTANEOUS at 06:03

## 2019-09-09 RX ADMIN — CHLORHEXIDINE GLUCONATE SCH APPLIC: 213 SOLUTION TOPICAL at 19:57

## 2019-09-09 RX ADMIN — METRONIDAZOLE SCH MG: 500 TABLET ORAL at 14:19

## 2019-09-09 RX ADMIN — ASPIRIN 81 MG SCH MG: 81 TABLET ORAL at 08:54

## 2019-09-09 RX ADMIN — ENOXAPARIN SODIUM SCH MG: 40 INJECTION SUBCUTANEOUS at 21:08

## 2019-09-09 RX ADMIN — SODIUM CHLORIDE SCH MLS/HR: 0.9 INJECTION INTRAVENOUS at 16:55

## 2019-09-09 RX ADMIN — ATORVASTATIN CALCIUM SCH MG: 20 TABLET, FILM COATED ORAL at 21:02

## 2019-09-09 RX ADMIN — Medication SCH MLS/HR: at 02:53

## 2019-09-09 NOTE — NUR
NURSE NOTES:

Vianney from [facility] called stating they are still waiting on authorization 
and the finance department is still working on it.

## 2019-09-09 NOTE — NUR
NURSE NOTES:

Received report from BRANDON Ford. Patient a/a/o breathing unlabored without distress, 
discomfort, or sob. Denies pain at this time. PICC line with two lumens noted intact on left 
upper arm with one lumen running TKO. Dressing intact. Walker noted at the bedside. Bed 
placed at the lowest with brakes and siderails up for safety. Call light placed within 
reach. Will continue to monitor and provide care as ordered.

## 2019-09-09 NOTE — NUR
SI:    GANGRENE LEFT SECOND TOE

T. 98.1 HR 77 RR 18 B/P 131/80

RA 98%

CR 1.5







IS:   VANCO IV

CEFEPIME IV

FKAGYL PO

PROTONIX 

INSULIN IV

*******MED/SURG STATUS******

## 2019-09-09 NOTE — NUR
NURSE NOTES:

received pt from NOC RN AMBER Loomis. Pt is awake, in semi-Fowlers in bed, no complaint of pain 
or discomfort. RFA IV access saline locked. Bed locked at the lowest position possible, call 
light within easy reach. Will continue to monitor pt and follow up with the plan of care.

## 2019-09-09 NOTE — NUR
*-* INSURANCE *-*



UPDATED CLINICALS AND REVIEW HAVE BEEN FAXED TO:



[facility]# [id number]

F: [phone]

-------------------------------------------------------------------------------

Addendum: 09/09/19 at 1534 by AVINASH BAUER 

-------------------------------------------------------------------------------

ALIGNMENT

(SEE ABOVE NOTES) 

PLEASE FAX THE REVIEW/CLINICAL

P- [phone]

F- [phone]...REVIEW/CLINICAL

## 2019-09-09 NOTE — NUR
NURSE NOTES:

changed dressing of the left 2nd toe. No secretion noted, foul odor, gangrenous black 
indurated area on tip and adjacent area of the toe. Painted with Betadine, placed a folded 
dry 4x4 gauze and secured with tape.

## 2019-09-09 NOTE — SURGERY PROGRESS NOTE
Surgery Progress Note


Subjective


Additional Comments


no acute events


comfortable


awaiting [facility] decision for transfer





Objective





Last 24 Hour Vital Signs








  Date Time  Temp Pulse Resp B/P (MAP) Pulse Ox O2 Delivery O2 Flow Rate FiO2


 


9/9/19 09:00      Room Air  


 


9/9/19 08:49  73  138/70    


 


9/9/19 08:00 98.4 73 18 138/70 (92) 99   


 


9/9/19 04:00 98.8 70 18 140/72 (94) 98   


 


9/9/19 00:00 99.1 85 19 115/65 (82) 99   


 


9/8/19 21:00      Room Air  


 


9/8/19 20:39  100  139/75    


 


9/8/19 20:00 97.9 100 19 139/75 (96) 98   


 


9/8/19 16:00 98.2 98 20 144/77 (99) 97   


 


9/8/19 12:00 97.3 85 19 134/70 (91) 97   








I&O











Intake and Output  


 


 9/8/19 9/9/19





 19:00 07:00


 


Intake Total 840 ml 716.666 ml


 


Balance 840 ml 716.666 ml


 


  


 


Intake Oral 740 ml 350 ml


 


IV Total 100 ml 366.666 ml








Dressing:  dry


Wound:  clean


Cardiovascular:  RSR


Respiratory:  clear


Abdomen:  soft, non-tender, present bowel sounds


Extremities:  cyanosis, other





Laboratory Tests








Test


  9/9/19


05:45


 


White Blood Count


  8.3 K/UL


(4.8-10.8)


 


Red Blood Count


  3.49 M/UL


(4.70-6.10)  L


 


Hemoglobin


  10.2 G/DL


(14.2-18.0)  L


 


Hematocrit


  30.0 %


(42.0-52.0)  L


 


Mean Corpuscular Volume 86 FL (80-99)  


 


Mean Corpuscular Hemoglobin


  29.1 PG


(27.0-31.0)


 


Mean Corpuscular Hemoglobin


Concent 33.9 G/DL


(32.0-36.0)


 


Red Cell Distribution Width


  11.9 %


(11.6-14.8)


 


Platelet Count


  235 K/UL


(150-450)


 


Mean Platelet Volume


  6.2 FL


(6.5-10.1)  L


 


Neutrophils (%) (Auto)


  59.5 %


(45.0-75.0)


 


Lymphocytes (%) (Auto)


  26.5 %


(20.0-45.0)


 


Monocytes (%) (Auto)


  10.5 %


(1.0-10.0)  H


 


Eosinophils (%) (Auto)


  2.6 %


(0.0-3.0)


 


Basophils (%) (Auto)


  0.9 %


(0.0-2.0)


 


Sodium Level


  144 MMOL/L


(136-145)


 


Potassium Level


  3.7 MMOL/L


(3.5-5.1)


 


Chloride Level


  112 MMOL/L


()  H


 


Carbon Dioxide Level


  22 MMOL/L


(21-32)


 


Anion Gap


  10 mmol/L


(5-15)


 


Blood Urea Nitrogen


  12 mg/dL


(7-18)


 


Creatinine


  1.5 MG/DL


(0.55-1.30)  H


 


Estimat Glomerular Filtration


Rate  mL/min (>60)  


 


 


Glucose Level


  79 MG/DL


()


 


Calcium Level


  8.9 MG/DL


(8.5-10.1)











Plan


Problems:  


(1) Elevated lactic acid level


Assessment & Plan:  Patient presented with lactic acidosis, cellulitis and 

infection of his left foot with necrosis and gangrene of the left second toe.


Continue with his resuscitation


Okay for diet


improving


stable 





(2) Gangrene of toe of left foot


Assessment & Plan:  Patient presents with left second ray gangrene dry foul-

smelling no purulent drainage edema and cellulitis of the foot.  Has had it for 

a few weeks and worsening since his trip to [place].


Plain films noted no fracture


IV antibiotics as per infectious disease


Pending venous and arterial studies as well





MRI: Impression: Abnormal STIR and T1 signal within the second distal phalanx, 

indicative


of osteomyelitis. Subtle increased STIR signal within the second middle phalanx


without corresponding T1 signal abnormality. This may reflect reactive changes 

or


very early osteomyelitis Dorsal edema. This may indicate cellulitis or may be 

vasogenic in origin





Swab necrotic area of left foot with Betadine wash foot daily apply gauze 

dressing


We will follow with recommendations as results of imaging are available


We will attempt to salvage as much possible but potentially may need an 

amputation


cont with IV abx and local wound care.  defer to ID for abx duration


Podiatry input appreciated


vascular clearance and likely amputation 


Strict glucose monitoring and control


Thank you will follow with recommendations





(3) DM (diabetes mellitus)











Raciel Meléndez Sep 9, 2019 11:48

## 2019-09-09 NOTE — GENERAL PROGRESS NOTE
Assessment/Plan


Problem List:  


(1) Gangrene of toe of left foot


ICD Codes:  I96 - Gangrene, not elsewhere classified


SNOMED:  72231208529906399


(2) Hyperglycemia


ICD Codes:  R73.9 - Hyperglycemia, unspecified


SNOMED:  76723477


(3) Renal failure


ICD Codes:  N19 - Unspecified kidney failure


SNOMED:  72202738


Qualifiers:  


   Qualified Codes:  N19 - Unspecified kidney failure


(4) Elevated lactic acid level


ICD Codes:  R79.89 - Other specified abnormal findings of blood chemistry


SNOMED:  3765549


(5) DM (diabetes mellitus)


ICD Codes:  E11.9 - Type 2 diabetes mellitus without complications


SNOMED:  77562250


Status:  stable


Assessment/Plan:


74 y/o male admitted with L second toe foul smelling necrosis, gangrene and 

osteomyelitis. 





# L second toe distal dry gangrene/ osteomyelitis shown on MRI foot. Vascular 

studies show patent arteries. 


# Diabetic foot ulcer


Cefepime and Vancomycin- renal dose, and Flagyl. day 8. wound cultures with 

klebsiella and staph aureus 


ESR and CRP weekly


ASA and statin


Surgical consult with Dr. Meléndez appreciated 


PICC placed 


ID consult to guide antibiotic coverage appreciated 


Podiatry consulted, Dr. Burrows, favoring amputation. 


Per vascular surgery, Dr. Brownlee, patient needs selective left leg angio 

prior to amputation. Will transfer to [facility] vs. [facility]   


 consult for transfer. [facility] contacted. Waiting for financial approval from 

insurance. 








 


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 


Patient denies prior renal disease and nephrology was consulted. Dr. Torres


IVF and monitor BMP


UA with microalbumin


Renal US reviewed 


MRI abdomen without contrast to work up bladder nodule seen on US 


continue to hold Losartan, will resume on discharge 





# IDDM- poorly controlled- HbA1c 10.4


finger stick glucose has been less than 200 on NPH 40 AM, 20 PM 


diabetic diet 





#Nausea, probably from Metronidazole. Add Zofran prn 





# Disposition


PT evaluation for possible need of assistive devices.


Would go home with home services 





FULL CODE


DVT and GI ppx





I spent 40 minutes on this encounter, 50% on counselling and care coordination





Subjective


Date patient seen:  Sep 9, 2019


Constitutional:  Denies: no symptoms, chills, diaphoresis, fever, malaise, 

weakness, other


HEENT:  Denies: no symptoms, eye pain, blurred vision, tearing, double vision, 

ear pain, ear discharge, nose pain, nose congestion, throat pain, throat 

swelling, mouth pain, mouth swelling, other


Cardiovascular:  Denies: no symptoms, chest pain, edema, irregular heart rate, 

lightheadedness, palpitations, syncope, other


Respiratory:  Denies: no symptoms, cough, orthopnea, shortness of breath, SOB 

with excertion, SOB at rest, sputum, stridor, wheezing, other


Gastrointestinal/Abdominal:  Denies: no symptoms, abdomen distended, abdominal 

pain, black stools, tarry stools, blood in stool, constipated, diarrhea, 

difficulty swallowing, nausea, poor appetite, poor fluid intake, rectal bleeding

, vomiting, other


Genitourinary:  Denies: no symptoms, burning, discharge, frequency, flank pain, 

hematuria, incontinence, pain, urgency, other


Neurologic/Psychiatric:  Denies: no symptoms, anxiety, depressed, emotional 

problems, headache, numbness, paresthesia, pre-existing deficit, seizure, 

tingling, tremors, weakness, other


Endocrine:  Denies: no symptoms, excessive sweating, flushing, intolerance to 

cold, intolerance to heat, increased hunger, increased thirst, increased urine, 

unexplained weight gain, unexplained weight loss, other


Hematologic/Lymphatic:  Denies: no symptoms, anemia, easy bleeding, easy 

bruising, other


Allergies:  


Coded Allergies:  


     No Known Allergies (Unverified , 6/2/16)


Subjective


Seen and examined. 


no abdominal pain, no diarrhea 


evaluated by podiatry, favors amputation.Per vascular surgery, Dr. Brownlee

, patient needs a selective left leg angio now waiting to transfer to [facility], 

pending insurance approval


Nausea better today, no abdominal pain or diarrhea





Objective





Last 24 Hour Vital Signs








  Date Time  Temp Pulse Resp B/P (MAP) Pulse Ox O2 Delivery O2 Flow Rate FiO2


 


9/9/19 12:00 98.1 77 18 131/80 (97) 98   


 


9/9/19 09:00      Room Air  


 


9/9/19 08:49  73  138/70    


 


9/9/19 08:00 98.4 73 18 138/70 (92) 99   


 


9/9/19 04:00 98.8 70 18 140/72 (94) 98   


 


9/9/19 00:00 99.1 85 19 115/65 (82) 99   


 


9/8/19 21:00      Room Air  


 


9/8/19 20:39  100  139/75    


 


9/8/19 20:00 97.9 100 19 139/75 (96) 98   


 


9/8/19 16:00 98.2 98 20 144/77 (99) 97   

















Intake and Output  


 


 9/8/19 9/9/19





 19:00 07:00


 


Intake Total 840 ml 716.666 ml


 


Balance 840 ml 716.666 ml


 


  


 


Intake Oral 740 ml 350 ml


 


IV Total 100 ml 366.666 ml








Laboratory Tests


9/9/19 05:45: 


White Blood Count 8.3, Red Blood Count 3.49L, Hemoglobin 10.2L, Hematocrit 30.0L

, Mean Corpuscular Volume 86, Mean Corpuscular Hemoglobin 29.1, Mean 

Corpuscular Hemoglobin Concent 33.9, Red Cell Distribution Width 11.9, Platelet 

Count 235, Mean Platelet Volume 6.2L, Neutrophils (%) (Auto) 59.5, Lymphocytes (

%) (Auto) 26.5, Monocytes (%) (Auto) 10.5H, Eosinophils (%) (Auto) 2.6, 

Basophils (%) (Auto) 0.9, Sodium Level 144, Potassium Level 3.7, Chloride Level 

112H, Carbon Dioxide Level 22, Anion Gap 10, Blood Urea Nitrogen 12, Creatinine 

1.5H, Estimat Glomerular Filtration Rate , Glucose Level 79, Calcium Level 8.9


Height (Feet):  5


Height (Inches):  6.00


Weight (Pounds):  181


Objective


General appearance:  alert, cooperative, no distress, appears stated age


Head:  Normocephalic, without obvious abnormality, atraumatic


Eyes:  conjunctivae/corneas clear. PERRL, EOM's intact. Fundi benign


Throat:  Lips, mucosa, and tongue normal. Teeth and gums normal


Neck:  supple, symmetrical, trachea midline, no adenopathy, thyroid: not 

enlarged, symmetric, no tenderness/mass/nodules, no carotid bruit and no JVD


Lungs:  clear to auscultation bilaterally


Heart:  regular rate and rhythm, S1, S2 normal, no murmur, click, rub or gallop


Abdomen:  soft, non-tender. Bowel sounds normal. No masses,  no organomegaly


Extremities:  extremities normal, atraumatic, necrotic left second toe. foul 

odor. no drainage . +picc present. clean and clear no erythema


Pulses:  2+ and symmetric


Skin:  Skin color, texture, turgor normal. No rashes or lesions


Neurologic:  Grossly normal











Guillermo Tompkins M.D. Sep 9, 2019 13:50

## 2019-09-09 NOTE — NEPHROLOGY PROGRESS NOTE
Assessment/Plan


Problem List:  


(1) Hyperglycemia


(2) Elevated lactic acid level


(3) Gangrene of toe of left foot


(4) Renal failure


(5) Dehydration


(6) HTN (hypertension)


(7) DM (diabetes mellitus)


Plan


# Acute kidney injury on possibley CKD stage III- likely diabetic nephropathy


# L second toe distal dry gangrene- with osteol


# IDDM


# HTN- controlled


# Diabetic foot ulcer





- hold losartan for now- ok to resume on DC-


- needs outpatient follow up with renal 


- continue to monitor off IVF


- continue metop 100 at night


- continue amlodipine 5mg daily 


- check renal US--> 





IMPRESSION:     


1.  No hydronephrosis.


2.  Nodular lesion arising from the prostate or bladder base, measures 1.


8 x 1.2 cm





mri pelvic to better delineate lesion --> Indentation of the bladder floor 














- continue abx


- avoid supratherapeutic vanco level


- podiatry/surgery eval noted 


 - patient needs selective left leg angio prior to amputation





Subjective


Subjective


no significant pain


No fevers or chills. 


mri foot done


pelvis mri done showing Indentation of the bladder floor 


s/p PICC line placed


Cr stable


 patient needs selective left leg angio prior to amputation





Objective


Objective





Last 24 Hour Vital Signs








  Date Time  Temp Pulse Resp B/P (MAP) Pulse Ox O2 Delivery O2 Flow Rate FiO2


 


9/9/19 09:00      Room Air  


 


9/9/19 08:49  73  138/70    


 


9/9/19 08:00 98.4 73 18 138/70 (92) 99   


 


9/9/19 04:00 98.8 70 18 140/72 (94) 98   


 


9/9/19 00:00 99.1 85 19 115/65 (82) 99   


 


9/8/19 21:00      Room Air  


 


9/8/19 20:39  100  139/75    


 


9/8/19 20:00 97.9 100 19 139/75 (96) 98   


 


9/8/19 16:00 98.2 98 20 144/77 (99) 97   

















Intake and Output  


 


 9/8/19 9/9/19





 19:00 07:00


 


Intake Total 840 ml 716.666 ml


 


Balance 840 ml 716.666 ml


 


  


 


Intake Oral 740 ml 350 ml


 


IV Total 100 ml 366.666 ml








Laboratory Tests


9/9/19 05:45: 


White Blood Count 8.3, Red Blood Count 3.49L, Hemoglobin 10.2L, Hematocrit 30.0L

, Mean Corpuscular Volume 86, Mean Corpuscular Hemoglobin 29.1, Mean 

Corpuscular Hemoglobin Concent 33.9, Red Cell Distribution Width 11.9, Platelet 

Count 235, Mean Platelet Volume 6.2L, Neutrophils (%) (Auto) 59.5, Lymphocytes (

%) (Auto) 26.5, Monocytes (%) (Auto) 10.5H, Eosinophils (%) (Auto) 2.6, 

Basophils (%) (Auto) 0.9, Sodium Level 144, Potassium Level 3.7, Chloride Level 

112H, Carbon Dioxide Level 22, Anion Gap 10, Blood Urea Nitrogen 12, Creatinine 

1.5H, Estimat Glomerular Filtration Rate , Glucose Level 79, Calcium Level 8.9


Height (Feet):  5


Height (Inches):  6.00


Weight (Pounds):  181











Kyler Torres M.D. Sep 9, 2019 12:04

## 2019-09-09 NOTE — NUR
HAND-OFF: 

Report given to BRANDON Ford. Endorsed that [facility] called again this morning 
stating they are still waiting on Authorization and the finance dept is working on it.

## 2019-09-09 NOTE — NUR
NURSE NOTES:

Patient's PICC line dressing was coming off with multiple tapes attached on top of the 
dressing. After confirming with the patient, decided to change the PICC line dressing before 
seven days. Explained all procedure to the patient and patient verbalized understanding. 
Sterile field prepared and proceeded the PICC line dressing change. Patient tolerate the 
procedure well. Patient in stable condition. No discomfort noted or complained. Denies pain. 
Will continue to monitor.

## 2019-09-10 VITALS — SYSTOLIC BLOOD PRESSURE: 118 MMHG | DIASTOLIC BLOOD PRESSURE: 65 MMHG

## 2019-09-10 VITALS — DIASTOLIC BLOOD PRESSURE: 56 MMHG | SYSTOLIC BLOOD PRESSURE: 127 MMHG

## 2019-09-10 VITALS — DIASTOLIC BLOOD PRESSURE: 75 MMHG | SYSTOLIC BLOOD PRESSURE: 139 MMHG

## 2019-09-10 VITALS — SYSTOLIC BLOOD PRESSURE: 124 MMHG | DIASTOLIC BLOOD PRESSURE: 62 MMHG

## 2019-09-10 VITALS — DIASTOLIC BLOOD PRESSURE: 65 MMHG | SYSTOLIC BLOOD PRESSURE: 135 MMHG

## 2019-09-10 VITALS — DIASTOLIC BLOOD PRESSURE: 62 MMHG | SYSTOLIC BLOOD PRESSURE: 125 MMHG

## 2019-09-10 VITALS — SYSTOLIC BLOOD PRESSURE: 125 MMHG | DIASTOLIC BLOOD PRESSURE: 62 MMHG

## 2019-09-10 RX ADMIN — ASPIRIN 81 MG SCH MG: 81 TABLET ORAL at 09:31

## 2019-09-10 RX ADMIN — Medication SCH MLS/HR: at 02:00

## 2019-09-10 RX ADMIN — METRONIDAZOLE SCH MG: 500 TABLET ORAL at 05:37

## 2019-09-10 RX ADMIN — ENOXAPARIN SODIUM SCH MG: 40 INJECTION SUBCUTANEOUS at 21:12

## 2019-09-10 RX ADMIN — ATORVASTATIN CALCIUM SCH MG: 20 TABLET, FILM COATED ORAL at 21:10

## 2019-09-10 RX ADMIN — CHLORHEXIDINE GLUCONATE SCH APPLIC: 213 SOLUTION TOPICAL at 21:09

## 2019-09-10 RX ADMIN — METRONIDAZOLE SCH MG: 500 TABLET ORAL at 21:09

## 2019-09-10 RX ADMIN — METRONIDAZOLE SCH MG: 500 TABLET ORAL at 13:51

## 2019-09-10 RX ADMIN — HUMAN INSULIN SCH UNITS: 100 INJECTION, SUSPENSION SUBCUTANEOUS at 06:21

## 2019-09-10 RX ADMIN — HUMAN INSULIN SCH UNITS: 100 INJECTION, SUSPENSION SUBCUTANEOUS at 16:14

## 2019-09-10 NOTE — NUR
NURSE NOTES:

Received report from BRANDON Begum. Patient stable a/a/o, Romansh speaking. Breathing unlabored 
without distress, discomfort, or sob. Denies pain at this time. Double lumen picc line noted 
on the left upper arm patent with intact dressing. Bed placed at the lowest with brake and 
siderails up for safety. Discharge order noted and awaiting for [facility]. Call light placed 
within reach. Will continue to monitor and provide care as ordered.

## 2019-09-10 NOTE — NUR
RD ASSESSMENT & RECOMMENDATIONS

SEE CARE ACTIVITY FOR COMPLETE ASSESSMENT



DAILY ESTIMATED NEEDS:

Needs based on DM, OM 69kg adj  

25-30  kcals/kg 

3644-3960  total kcals

1.25-1.5  g protein/kg

  g total protein 

25-30  mL/kg

5019-9972  total fluid mLs



NUTRITION DIAGNOSIS:

1) Increased pro needs r/t wound healing as evidence by L foot DM ulcer,

w/ OM per MD.

2) Altered nutrition related lab values r/t diabetes as evidenced by A1C

10.4, Uglu 4+ on adm.





CURRENT DIET: CCHO MED     

 



PO DIET RECOMMENDATIONS:

Consider CCHO LOW + Double protein portions  



 



ADDITIONAL RECOMMENDATIONS:

1) Obtain a standing weight as able 

2) Wound care: JAYLEEN BID + MVI x1 + vit C 250mg BID  

3) Diet edu as able  

4) Add high pro/ 1 carb snacks in b/w meals

## 2019-09-10 NOTE — NUR
NOTES







PT ACCEPTED TO [facility]. PT IN AGREEMENT WITH DC AT THIS TIME. PT TO GO TO ROOM 
[address] BED [address]. DR VOGEL RECEIVING DOCTOR. NURSE TO CALL REPORT [phone]. [facility] TO 
TRANSPORT PT WITH AUTH. [id number]. PT WILL BE SKILLED.







[facility]

[address]

[place] [address]

 [address]

[phone]

## 2019-09-10 NOTE — DISCHARGE INSTRUCTIONS
Discharge Instructions


Discharge Instructions


Diet:  diabetic calorie control


Activity:  as tolerated





For Congestive Heart Failure


Reminder


Report to your physician any weight gain of 5 pounds or more in one week.











Guillermo Tompkins M.D. Sep 10, 2019 13:20

## 2019-09-10 NOTE — SURGERY PROGRESS NOTE
Surgery Progress Note


Subjective


Symptoms:  improved, tolerating diet, voiding well, passing flatus





Objective





Last 24 Hour Vital Signs








  Date Time  Temp Pulse Resp B/P (MAP) Pulse Ox O2 Delivery O2 Flow Rate FiO2


 


9/10/19 09:31  84  139/75    


 


9/10/19 08:26      Room Air  


 


9/10/19 08:00 98.1 84 18 139/75 (96) 96   


 


9/10/19 04:00 98.4 81 19 118/65 (82) 95   


 


9/10/19 00:00 98.1 88 18 127/56 (79) 95   


 


9/9/19 21:02  95  134/73    


 


9/9/19 21:00      Room Air  


 


9/9/19 20:00 97.9 100 18 145/74 (97) 95   


 


9/9/19 16:00 98.5 106 18 130/75 (93) 98   








I&O











Intake and Output  


 


 9/9/19 9/10/19





 19:00 07:00


 


Intake Total 1110 ml 455.000 ml


 


Output Total  300 ml


 


Balance 1110 ml 155.000 ml


 


  


 


Intake Oral 1000 ml 180 ml


 


IV Total 110 ml 275.000 ml


 


Output Urine Total  300 ml


 


# Voids 8 1








Dressing:  dry


Wound:  clean


Cardiovascular:  RSR


Respiratory:  clear


Abdomen:  soft, present bowel sounds, non-distended


Extremities:  no cyanosis





Plan


Problems:  


(1) Elevated lactic acid level


Assessment & Plan:  Patient presented with lactic acidosis, cellulitis and 

infection of his left foot with necrosis and gangrene of the left second toe.


Continue with his resuscitation


Okay for diet


improving


stable 





(2) Gangrene of toe of left foot


Assessment & Plan:  Patient presents with left second ray gangrene dry foul-

smelling no purulent drainage edema and cellulitis of the foot.  Has had it for 

a few weeks and worsening since his trip to [place].


Plain films noted no fracture


IV antibiotics as per infectious disease


Pending venous and arterial studies as well





MRI: Impression: Abnormal STIR and T1 signal within the second distal phalanx, 

indicative


of osteomyelitis. Subtle increased STIR signal within the second middle phalanx


without corresponding T1 signal abnormality. This may reflect reactive changes 

or


very early osteomyelitis Dorsal edema. This may indicate cellulitis or may be 

vasogenic in origin





Swab necrotic area of left foot with Betadine wash foot daily apply gauze 

dressing


We will follow with recommendations as results of imaging are available


We will attempt to salvage as much possible but potentially may need an 

amputation


cont with IV abx and local wound care.  defer to ID for abx duration


Podiatry input appreciated


vascular clearance and likely amputation 


Strict glucose monitoring and control


Thank you will follow with recommendations





(3) DM (diabetes mellitus)


Additional Comments


No acute events.  Doing well.


Discharge planning on IV antibiotics for 6 weeks given osteo


Outpatient wound care as per podiatry


Familia paz DC from surgical standpoint











Raciel Meléndez Sep 10, 2019 12:09

## 2019-09-10 NOTE — NUR
*-* DISCHARGE PLANNING *-*



PATIENT HAS BEEN REFERRED TO:



[facility]

P: [phone]

F: [phone]



OTHER FACILITIES THEY ARE CONTRACTED WITH



[facility]

P: [phone]

F: [phone]



[facility]

P: [phone]

F: [phone]



[facility]

P: [phone]

F: [phone]


-------------------------------------------------------------------------------

Addendum: 09/10/19 at 1453 by AVINASH BAUER CM

-------------------------------------------------------------------------------

[facility] [phone]

## 2019-09-10 NOTE — INFECTIOUS DISEASES PROG NOTE
Assessment/Plan


Assessment/Plan








ASSESSMENT AND PLAN:





1. left foot 2nd toe gangrene with osteomyelitis/cellulitis and infected wound, 

wc - staph aureus and klebsiella 





   - continue vancomycin, cefepime and flagyl - day # 10 abx 


   - monitor labs


   - plan on transfer to higher level of care, podiatry and vascular surgery f/u


   


2. Anemia.


3. Elevated creatinine.


4. Diabetes.


5. Hypertension.


6. Blood sugar and blood pressure treatment per primary.


7. BPH.


8. No known allergies.


9. Social history is negative.


10. Family history is noncontributory.


11. MAR was noted.


12. Case was discussed with RN.


13. Continue treatment per primary consultants.





Subjective


Constitutional:  Denies: fever


HEENT:  Denies: congestion


Respiratory:  Denies: shortness of breath


Cardiovascular:  Denies: chest pain


Gastrointestinal/Abdominal:  Denies: nausea, vomiting, diarrhea


Genitourinary:  Denies: dysuria, hematuria


Neurologic:  Denies: headache


Psychiatric:  Denies: depression


Skin:  Denies: rash


Hematologic:  Denies: bleeding


Musculoskeletal:  Denies: pain


Allergies:  


Coded Allergies:  


     No Known Allergies (Unverified , 6/2/16)





Objective


Vital Signs





Last 24 Hour Vital Signs








  Date Time  Temp Pulse Resp B/P (MAP) Pulse Ox O2 Delivery O2 Flow Rate FiO2


 


9/10/19 12:00 97.9 84 18 124/62 (82) 95   


 


9/10/19 09:31  84  139/75    


 


9/10/19 08:26      Room Air  


 


9/10/19 08:00 98.1 84 18 139/75 (96) 96   


 


9/10/19 04:00 98.4 81 19 118/65 (82) 95   


 


9/10/19 00:00 98.1 88 18 127/56 (79) 95   


 


9/9/19 21:02  95  134/73    


 


9/9/19 21:00      Room Air  


 


9/9/19 20:00 97.9 100 18 145/74 (97) 95   


 


9/9/19 16:00 98.5 106 18 130/75 (93) 98   








Height (Feet):  5


Height (Inches):  6.00


Weight (Pounds):  181


General Appearance:  no acute distress


HEENT:  normocephalic, atraumatic, anicteric


Respiratory/Chest:  lungs clear, normal breath sounds, no respiratory distress


Cardiovascular:  normal rate, regular rhythm, no gallop/murmur, no JVD


Abdomen:  normal bowel sounds, soft, non tender, no organomegaly, non distended


Genitourinary:  other - no benjamin


Extremities:  other - left foot 2nd toe gangrene


Skin:  no rash


Neurologic/Psychiatric:  CNs II-XII grossly normal, alert, oriented x 3, 

responsive


Lymphatic:  no neck adenopathy


Musculoskeletal:  no effusion


Objective





Left foot MRI:





Impression: Abnormal STIR and T1 signal within the second distal phalanx, 

indicative


of osteomyelitis. Subtle increased STIR signal within the second middle phalanx


without corresponding T1 signal abnormality. This may reflect reactive changes 

or


very early osteomyelitis


 


Dorsal edema. This may indicate cellulitis or may be vasogenic in origin





Microbiology








 Date/Time


Source Procedure


Growth Status


 


 


 8/30/19 17:00


Blood Blood Culture - Final


NO GROWTH AFTER 5 DAYS Complete





 8/30/19 16:33


Foot Left Gram Stain - Final Complete


 


 8/30/19 16:33 Wound Culture - Final


Klebsiella Oxytoca


Staphylococcus Aureus


Diphtheroids Complete











Labs








Test


  9/9/19


05:45


 


White Blood Count


  8.3 K/UL


(4.8-10.8)


 


Red Blood Count


  3.49 M/UL


(4.70-6.10)


 


Hemoglobin


  10.2 G/DL


(14.2-18.0)


 


Hematocrit


  30.0 %


(42.0-52.0)


 


Mean Corpuscular Volume 86 FL (80-99) 


 


Mean Corpuscular Hemoglobin


  29.1 PG


(27.0-31.0)


 


Mean Corpuscular Hemoglobin


Concent 33.9 G/DL


(32.0-36.0)


 


Red Cell Distribution Width


  11.9 %


(11.6-14.8)


 


Platelet Count


  235 K/UL


(150-450)


 


Mean Platelet Volume


  6.2 FL


(6.5-10.1)


 


Neutrophils (%) (Auto)


  59.5 %


(45.0-75.0)


 


Lymphocytes (%) (Auto)


  26.5 %


(20.0-45.0)


 


Monocytes (%) (Auto)


  10.5 %


(1.0-10.0)


 


Eosinophils (%) (Auto)


  2.6 %


(0.0-3.0)


 


Basophils (%) (Auto)


  0.9 %


(0.0-2.0)


 


Sodium Level


  144 MMOL/L


(136-145)


 


Potassium Level


  3.7 MMOL/L


(3.5-5.1)


 


Chloride Level


  112 MMOL/L


()


 


Carbon Dioxide Level


  22 MMOL/L


(21-32)


 


Anion Gap


  10 mmol/L


(5-15)


 


Blood Urea Nitrogen


  12 mg/dL


(7-18)


 


Creatinine


  1.5 MG/DL


(0.55-1.30)


 


Estimat Glomerular Filtration


Rate  mL/min (>60) 


 


 


Glucose Level


  79 MG/DL


()


 


Calcium Level


  8.9 MG/DL


(8.5-10.1)











Current Medications








 Medications


  (Trade)  Dose


 Ordered  Sig/Sherly


 Route


 PRN Reason  Start Time


 Stop Time Status Last Admin


Dose Admin


 


 Acetaminophen


  (Tylenol)  650 mg  Q4H  PRN


 ORAL


 Mild Pain (Pain Scale 1-3)  8/30/19 18:30


 9/29/19 18:29  9/7/19 04:22


 


 


 Amlodipine


 Besylate


  (Norvasc)  5 mg  DAILY


 ORAL


   9/2/19 09:00


 10/2/19 08:59  9/10/19 09:31


 


 


 Aspirin


  (ASA)  81 mg  DAILY


 ORAL


   9/8/19 09:00


 10/8/19 08:59  9/10/19 09:31


 


 


 Atorvastatin


 Calcium


  (Lipitor)  20 mg  BEDTIME


 ORAL


   9/7/19 21:00


 10/7/19 20:59  9/9/19 21:02


 


 


 Cefepime HCl 2 gm/


 Dextrose  110 ml @ 


 220 mls/hr  Q24H


 IV


   9/4/19 16:00


 9/11/19 15:59  9/9/19 16:55


 


 


 Chlorhexidine


 Gluconate


  (Nickie-Hex 2%)  1 applic  DAILY@2000


 TOPIC


   9/3/19 20:00


 10/3/19 19:59  9/9/19 19:57


 


 


 Dextrose


  (Dextrose 50%)  25 ml  Q30M  PRN


 IV


 Hypoglycemia  8/30/19 18:30


 9/29/19 18:29   


 


 


 Dextrose


  (Dextrose 50%)  50 ml  Q30M  PRN


 IV


 Hypoglycemia  8/30/19 18:30


 9/29/19 18:29   


 


 


 Enoxaparin Sodium


  (Lovenox)  40 mg  QHS


 SUBQ


   9/4/19 21:00


 10/4/19 20:59  9/9/19 21:08


 


 


 Insulin Human NPH


  (Humulin N)  20 units  BEFORE  DINNER


 SUBQ


   8/31/19 16:30


 9/30/19 16:29  9/9/19 17:48


 


 


 Insulin Human NPH


  (Humulin N)  40 units  BEFORE  BREAKFAST


 SUBQ


   8/31/19 06:30


 9/30/19 06:29  9/10/19 06:21


 


 


 Metoprolol


 Tartrate


  (Lopressor)  100 mg  BEDTIME


 ORAL


   8/31/19 21:00


 9/30/19 20:59  9/9/19 21:02


 


 


 Metronidazole


  (Flagyl)  500 mg  EVERY 8  HOURS


 ORAL


   9/4/19 14:00


 9/11/19 13:59  9/10/19 05:37


 


 


 Ondansetron HCl


  (Zofran)  4 mg  Q6H  PRN


 IVP


 Nausea & Vomiting  9/8/19 12:15


 10/8/19 12:14  9/10/19 01:34


 


 


 Pantoprazole


  (Protonix)  40 mg  DAILY


 ORAL


   8/31/19 09:00


 9/30/19 08:59  9/10/19 09:31


 


 


 Polyethylene


 Glycol


  (Miralax)  17 gm  HSPRN  PRN


 ORAL


 Constipation  8/30/19 18:30


 9/29/19 18:29   


 


 


 Vancomycin HCl


  (Vanco rx to


 dose)  1 ea  DAILY  PRN


 MISC


 Per rx protocol  8/30/19 18:30


 9/29/19 18:29   


 


 


 Vancomycin/Sodium


 Chloride  275 ml @ 


 183.333


 mls/hr  Q24H


 IVPB


   9/9/19 02:00


 9/14/19 01:59  9/10/19 02:00


 

















Eve Milan MD Sep 10, 2019 12:31

## 2019-09-10 NOTE — NEPHROLOGY PROGRESS NOTE
Assessment/Plan


Problem List:  


(1) Hyperglycemia


(2) Elevated lactic acid level


(3) Gangrene of toe of left foot


(4) Renal failure


(5) Dehydration


(6) HTN (hypertension)


(7) DM (diabetes mellitus)


Plan


# Acute kidney injury on possibley CKD stage III- likely diabetic nephropathy


# L second toe distal dry gangrene- with osteol


# IDDM


# HTN- controlled


# Diabetic foot ulcer





- hold losartan for now- ok to resume on DC-


- needs outpatient follow up with renal 


- continue to monitor off IVF


- continue metop 100 at night


- continue amlodipine 5mg daily 


- check renal US--> 





IMPRESSION:     


1.  No hydronephrosis.


2.  Nodular lesion arising from the prostate or bladder base, measures 1.


8 x 1.2 cm





mri pelvic to better delineate lesion --> Indentation of the bladder floor 

possibly causing chronic outlet obstruction





NEEDS follow up with urology - can arrange while at [facility]








- continue abx


- avoid supratherapeutic vanco level


- podiatry/surgery eval noted 


 - patient needs selective left leg angio prior to amputation





Subjective


Subjective


no significant pain


No fevers or chills. 


mri foot done


pelvis mri done showing Indentation of the bladder floor 


s/p PICC line placed


Cr stable- 1.5


 patient needs selective left leg angio prior to amputation





Objective


Objective





Last 24 Hour Vital Signs








  Date Time  Temp Pulse Resp B/P (MAP) Pulse Ox O2 Delivery O2 Flow Rate FiO2


 


9/10/19 16:00 97.7 94 18 135/65 (88) 98   


 


9/10/19 12:00 97.9 84 18 124/62 (82) 95   


 


9/10/19 09:31  84  139/75    


 


9/10/19 08:26      Room Air  


 


9/10/19 08:00 98.1 84 18 139/75 (96) 96   


 


9/10/19 04:00 98.4 81 19 118/65 (82) 95   


 


9/10/19 00:00 98.1 88 18 127/56 (79) 95   


 


9/9/19 21:02  95  134/73    


 


9/9/19 21:00      Room Air  


 


9/9/19 20:00 97.9 100 18 145/74 (97) 95   

















Intake and Output  


 


 9/9/19 9/10/19





 19:00 07:00


 


Intake Total 1110 ml 455.000 ml


 


Output Total  300 ml


 


Balance 1110 ml 155.000 ml


 


  


 


Intake Oral 1000 ml 180 ml


 


IV Total 110 ml 275.000 ml


 


Output Urine Total  300 ml


 


# Voids 8 1








Height (Feet):  5


Height (Inches):  6.00


Weight (Pounds):  181











Kyler Torres M.D. Sep 10, 2019 17:10

## 2019-09-10 NOTE — NUR
HAND-OFF: 

Report given to BRANDON Dasilva. Called [facility] to confirm DC , should be another 30-45 
minutes.

## 2019-09-10 NOTE — NUR
NURSE NOTES:

Given report to Willian [facility] personnel. Patient a/a/o and breathing unlabored without 
distress, discomfort, or sob. Denies pain at this time. VS: 148/75 HR99 RR 18 O2Sat 99% on 
room air and  0/10 pain. Patient discharged with PICC line per MD order. Skin intact except 
the gangrene on the left 2nd toe. Removed ID band and belongings list confirmed and signed 
by patient. Left the unit on a gurney. 

-------------------------------------------------------------------------------

Addendum: 09/10/19 at 2352 by Vidya Connors RN

-------------------------------------------------------------------------------

Called [facility] and confirmed with person name Deedee to follow up on Dr. Milan 
order for labs on 09/12/19. Confirmed order paper received and to follow up accordingly.

## 2019-09-10 NOTE — NUR
*-* DISCHARGE PLANNING *-*



PATIENT HAS BEEN REFERRED TO:



[facility]

P: [phone]

F: [phone]

## 2019-09-10 NOTE — NUR
NURSE NOTES:

Received report from Minsu, RN. Pt awake, talkative, ambulatory, no apparent distress noted, 
no complaints of pain at this time, dressing to 2nd left toe dry and intact, bed in lowest 
position, call light within reach, PICC line patent in both lumens.

## 2019-09-10 NOTE — DISCHARGE SUMMARY
Discharge Summary


Hospital Course


Date of Admission


Aug 30, 2019 at 17:26


Date of Discharge


9/10/2019


Admitting Diagnosis


gangrene L 2nd toe/DM


HPI


Warner Dyson is a 73 year old male who was admitted on Aug 30, 2019 at 17

:26 for Gangrene Left Second Toe, Diabetes


Consultations


ID: Dr. Bergman


Nephology: Dr. Kyler Torres


Podiatry: Dr. Burrows


Vascular; Dr. Samarian 


Surgery: Dr. Meléndez


Procedures


Foot XR


CXR


Venous and arterial duplex LE


Renal US


MRI pelvis


MRI foot


PICC insertion


Hospital Course


72 y/o male admitted with L second toe foul smelling necrosis, gangrene and 

osteomyelitis. 





#left foot 2nd toe gangrene with osteomyelitis/cellulitis and infected wound, 

wc - staph aureus and klebsiella 


- continue vancomycin, cefepime and flagyl - day # 10 abx 


- monitor labs


- podiatry and vascular surgery follow up. Needs selective left leg angio 

before amputation. 


-ASA and statin





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 


Patient denies prior renal disease and nephrology was consulted. Dr. Torres


monitor BMP


Renal US:


FINDINGS:


  Right kidney:  Right kidney measures 10.2 x 5.9 cm.  No hydronephrosis. 


 No perinephric collection.


  Left kidney:  Left kidney measures 10.5 x 5.0 cm.  No hydronephrosis or 


perinephric collection.


  Bladder:  Nodular lesion arising from the prostate or bladder base, 


measures 1.8 x 1.2 cm..  Bladder volume 162 mL


  Other findings:  Heterogeneous nodular prostate measuring 4.6 x 4.2 x 4.


3 cm.


 IMPRESSION:     


1.  No hydronephrosis.


2.  Nodular lesion arising from the prostate or bladder base, measures 1.


8 x 1.2 cm





#Bladder outlet obstruction, + Trace free fluid in pelvis on MRI pelvis 


Urology follow up at [facility] 





# IDDM- poorly controlled- HbA1c 10.4


finger stick glucose has been less than 200 on NPH 40 AM, 20 PM 


diabetic diet 





#Nausea, probably from Metronidazole. Add Zofran prn 





# Disposition


SNF 





FULL CODE


DVT and GI ppx





I spent 40 minutes on this encounter.





Discharge Medications


New Medications:  


Cefepime Hcl/Dextrose, Iso-Osm (Cefepime 2 Gm Injection) 2 Gm/100 Ml Froz.piggy


2 GM IV DAILY for 32 Days, #1 BAG 1 Refill





Ondansetron Odt* (Zofran Odt*) 4 Mg Tab.rapdis


4 MG BC EVERY 8 HOURS for 10 Days, #10 TAB 0 Refills





Vancomycin HCl in Water (Vancomycin 1,250 mg/12.5 ml Vl) 1.25 Gm/12.5 Ml Vial


1.25 GM IV DAILY for 32 Days, #1 VIAL 1 Refill





Acetaminophen* (Acetaminophen 325MG Tablet*) 325 Mg Tablet


650 MG ORAL Q4H PRN for 30 Days, #30 TAB





Amlodipine Besylate (Norvasc) 5 Mg Tablet


5 MG ORAL DAILY for 30 Days, #30 TAB





Aspirin* (Aspirin*) 81 Mg Tab.chew


81 MG ORAL DAILY for 30 Days, #30 TAB





Atorvastatin Calcium* (Lipitor*) 20 Mg Tablet


20 MG ORAL BEDTIME for 30 Days, #30 TAB





Chlorhexidine Gluconate* (Hibiclens*) 118 Ml Liquid


1 APPLIC TOPIC DAILY@2000 for 30 Days, #30 ML





[D50w] () 50 ML SOLN


50 ML IV Q30M PRN





[D50w] () 50 ML SOLN


25 ML IV Q30M PRN





Metoprolol Tartrate* (Metoprolol Tartrate*) 100 Mg Tablet


100 MG ORAL BEDTIME for 30 Days, #30 TAB





Metronidazole* (Flagyl*) 500 Mg Tablet


500 MG ORAL EVERY 8 HOURS for 32 Days, #96 TAB





Nph, Human Insulin Isophane* (Novolin N*) 100 Unit/1 Ml Vial


20 UNITS SUBQ BEFORE DINNER for 30 Days, #1 VIAL





Nph, Human Insulin Isophane* (Novolin N*) 100 Unit/1 Ml Vial


40 UNITS SUBQ BEFORE BREAKFAST for 30 Days, #1 VIAL





Pantoprazole* (Pantoprazole*) 40 Mg Tablet.dr


40 MG ORAL DAILY for 30 Days, #30 TAB





Polyethylene Glycol 3350 (Miralax) 119 Gm Powder


17 GM ORAL HSPRN PRN for 10 Days, #17 GM





 


Discontinued Medications:  


Acetaminophen (Acetaminophen) 650 Mg/20.3 Ml Solution


650 MG RC QID PRN for Fever/Headache/Mild Pain, ML 0 Refills





Hydromorphone Hcl/Pf (Hydromorphone Hcl 1 Mg/Ml Amp) 1 Mg/1 Ml Ampul


1 MG IVP Q4HR for For Pain, AMP





Insulin Glargine (Lantus) 100 Unit/1 Ml Insuln.pen


30 SUBQ, #1 EA 0 Refills





Losartan Potassium* (Losartan Potassium*) 50 Mg Tablet


100 MG ORAL DAILY, TAB





Metoprolol Tartrate* (Metoprolol Tartrate*) 25 Mg Tablet


Unknown Dose ORAL EVERY 12 HOURS, TAB





Omeprazole (Omeprazole) 10 Mg Capsule.dr


Unknown Dose ORAL DAILY, #30 CAP 0 Refills





Ondansetron* (Zofran*) 4 Mg/2 Ml Vial


4 MG IVP for Nausea & Vomiting, VIAL





Saline (Sodium Chloride) 2.5 Ml Syringe


10 ML IVF Q8HR for for line flush, SYR











Discharge


Condition Upon Discharge:  stable


Discharge Disposition


Patient was discharged to SNF.


Discharge Diagnoses:  


(1) Gangrene of toe of left foot


(2) Bladder outlet obstruction


(3) Renal failure


(4) Elevated LFTs


(5) DM (diabetes mellitus)


(6) HTN (hypertension)


(7) Renal insufficiency


(8) Hyperglycemia


(9) Elevated lactic acid level





Discharge Instructions


Discharge Instructions


Activity:  as tolerated











Guillermo Tompkins M.D. Sep 10, 2019 13:36

## 2019-09-10 NOTE — NUR
*-* INSURANCE *-*



UPDATED CLINICALS AND REVIEW HAVE BEEN FAXED TO:





ALIGNMENT

(SEE ABOVE NOTES) 

PLEASE FAX THE REVIEW/CLINICAL

P- [phone]

F- [phone]...REVIEW/CLINICAL



& 



[facility]

: [place]

#[phone] Ext. [phone]

Fax#[phone]

## 2019-09-12 NOTE — CONSULTATION
DATE OF CONSULTATION:  09/06/2019

VASCULAR SURGERY CONSULTATION



CONSULTING PHYSICIAN:  Anthony Brownlee M.D.



REFERRING PHYSICIANS:

1. Reji Burrows D.P.M.

2. Wander Pardo D.P.M.

3. Sue Sheehan M.D.



REASON FOR CONSULTATION:  Left toe gangrene, second toe.



HISTORY OF PRESENT ILLNESS:  This is a 73-year-old  male, who

suffers from diabetes mellitus and hypertension, who presented with left

second toe dry gangrene.  Vascular Surgery is consulted for evaluation.

The patient currently has no other complaints _____ with some

difficulty.



PAST MEDICAL HISTORY:  As above.  History of obesity, hypertension,

diabetes mellitus, PAD, and recent left toe second toe gangrene.



MEDICATIONS:  See attached MAR.



ALLERGIES:  No known drug allergies.



SOCIAL HISTORY:  Denies history of smoking, drugs, or alcohol

abuse.



FAMILY HISTORY:  Unremarkable.



REVIEW OF SYSTEMS:  CARDIOVASCULAR:  No history of chest pain or

palpitations.  PULMONARY:  No cough.  No hemoptysis.  GASTROINTESTINAL:

No history of abdominal pain, constipation, or diarrhea.  GENITOURINARY:

No urinary symptoms.  NEUROLOGIC:  No history of strokes or seizures.



PHYSICAL EXAMINATION:

VITAL SIGNS:  The patient is currently afebrile, 97.7, heart rate is 80,

blood pressure 150/70, and respirations 16.  The patient has palpable

radial pulses.

NECK:  No evidence of carotid bruit.

LUNGS:  Clear to auscultation.

HEART:  Regular.

ABDOMEN:  Soft and nontender.

EXTREMITIES:  He has palpable femoral pulses.  Absent popliteal and pedal

pulses bilaterally.  There is a left second toe dry gangrene and

necrosis.



IMPRESSION:

1. Ischemic left second toe dry gangrene and necrosis.

2. History of calcific arterial occlusive disease with absent popliteal

and pedal pulses.

3. Obesity with hypertension, diabetes mellitus, and hyperlipidemia.



PLAN AND RECOMMENDATIONS:

1. The patient should be on antiplatelet and statin therapy.

2. Complete the _____ duplex and CT imaging and schedule the patient for

selective left leg angiography to assess for revascularization once

medically optimized _____.









  ______________________________________________

  Anthony Brownlee M.D.





DR:  GEN

D:  09/11/2019 16:07

T:  09/12/2019 02:49

JOB#:  [id number]

CC:  DAO Leonardo.P.M.; Fax#:  [phone]

DAO LEOS.P.RAMONA  ; FAX#:  [phone]

SUE SHEEHAN M.D.; FAX#:  [phone]

## 2019-10-04 ENCOUNTER — HOSPITAL ENCOUNTER (INPATIENT)
Dept: HOSPITAL 72 - EMR | Age: 74
LOS: 6 days | Discharge: ANOTHER HOSPITAL | DRG: 871 | End: 2019-10-10
Payer: COMMERCIAL

## 2019-10-04 VITALS — SYSTOLIC BLOOD PRESSURE: 173 MMHG | DIASTOLIC BLOOD PRESSURE: 67 MMHG

## 2019-10-04 VITALS — DIASTOLIC BLOOD PRESSURE: 54 MMHG | SYSTOLIC BLOOD PRESSURE: 139 MMHG

## 2019-10-04 VITALS — SYSTOLIC BLOOD PRESSURE: 123 MMHG | DIASTOLIC BLOOD PRESSURE: 65 MMHG

## 2019-10-04 VITALS — SYSTOLIC BLOOD PRESSURE: 122 MMHG | DIASTOLIC BLOOD PRESSURE: 69 MMHG

## 2019-10-04 VITALS — SYSTOLIC BLOOD PRESSURE: 119 MMHG | DIASTOLIC BLOOD PRESSURE: 49 MMHG

## 2019-10-04 VITALS — DIASTOLIC BLOOD PRESSURE: 66 MMHG | SYSTOLIC BLOOD PRESSURE: 140 MMHG

## 2019-10-04 VITALS — DIASTOLIC BLOOD PRESSURE: 68 MMHG | SYSTOLIC BLOOD PRESSURE: 139 MMHG

## 2019-10-04 VITALS — DIASTOLIC BLOOD PRESSURE: 62 MMHG | SYSTOLIC BLOOD PRESSURE: 116 MMHG

## 2019-10-04 VITALS — DIASTOLIC BLOOD PRESSURE: 82 MMHG | SYSTOLIC BLOOD PRESSURE: 128 MMHG

## 2019-10-04 VITALS — DIASTOLIC BLOOD PRESSURE: 56 MMHG | SYSTOLIC BLOOD PRESSURE: 133 MMHG

## 2019-10-04 VITALS — DIASTOLIC BLOOD PRESSURE: 92 MMHG | SYSTOLIC BLOOD PRESSURE: 125 MMHG

## 2019-10-04 VITALS — SYSTOLIC BLOOD PRESSURE: 134 MMHG | DIASTOLIC BLOOD PRESSURE: 60 MMHG

## 2019-10-04 VITALS — SYSTOLIC BLOOD PRESSURE: 114 MMHG | DIASTOLIC BLOOD PRESSURE: 58 MMHG

## 2019-10-04 VITALS — DIASTOLIC BLOOD PRESSURE: 67 MMHG | SYSTOLIC BLOOD PRESSURE: 110 MMHG

## 2019-10-04 VITALS — HEIGHT: 66 IN | BODY MASS INDEX: 27.23 KG/M2 | WEIGHT: 169.4 LBS

## 2019-10-04 VITALS — SYSTOLIC BLOOD PRESSURE: 129 MMHG | DIASTOLIC BLOOD PRESSURE: 74 MMHG

## 2019-10-04 VITALS — DIASTOLIC BLOOD PRESSURE: 67 MMHG | SYSTOLIC BLOOD PRESSURE: 115 MMHG

## 2019-10-04 VITALS — SYSTOLIC BLOOD PRESSURE: 144 MMHG | DIASTOLIC BLOOD PRESSURE: 75 MMHG

## 2019-10-04 VITALS — SYSTOLIC BLOOD PRESSURE: 132 MMHG | DIASTOLIC BLOOD PRESSURE: 64 MMHG

## 2019-10-04 VITALS — DIASTOLIC BLOOD PRESSURE: 55 MMHG | SYSTOLIC BLOOD PRESSURE: 132 MMHG

## 2019-10-04 VITALS — SYSTOLIC BLOOD PRESSURE: 118 MMHG | DIASTOLIC BLOOD PRESSURE: 77 MMHG

## 2019-10-04 VITALS — SYSTOLIC BLOOD PRESSURE: 144 MMHG | DIASTOLIC BLOOD PRESSURE: 57 MMHG

## 2019-10-04 VITALS — DIASTOLIC BLOOD PRESSURE: 100 MMHG | SYSTOLIC BLOOD PRESSURE: 120 MMHG

## 2019-10-04 VITALS — DIASTOLIC BLOOD PRESSURE: 56 MMHG | SYSTOLIC BLOOD PRESSURE: 139 MMHG

## 2019-10-04 DIAGNOSIS — I50.9: ICD-10-CM

## 2019-10-04 DIAGNOSIS — J18.9: ICD-10-CM

## 2019-10-04 DIAGNOSIS — I25.10: ICD-10-CM

## 2019-10-04 DIAGNOSIS — E10.628: ICD-10-CM

## 2019-10-04 DIAGNOSIS — I50.23: ICD-10-CM

## 2019-10-04 DIAGNOSIS — L03.119: ICD-10-CM

## 2019-10-04 DIAGNOSIS — K57.32: ICD-10-CM

## 2019-10-04 DIAGNOSIS — E87.6: ICD-10-CM

## 2019-10-04 DIAGNOSIS — N18.9: ICD-10-CM

## 2019-10-04 DIAGNOSIS — K80.20: ICD-10-CM

## 2019-10-04 DIAGNOSIS — R33.8: ICD-10-CM

## 2019-10-04 DIAGNOSIS — N31.9: ICD-10-CM

## 2019-10-04 DIAGNOSIS — I13.0: ICD-10-CM

## 2019-10-04 DIAGNOSIS — N40.1: ICD-10-CM

## 2019-10-04 DIAGNOSIS — E05.90: ICD-10-CM

## 2019-10-04 DIAGNOSIS — I11.0: ICD-10-CM

## 2019-10-04 DIAGNOSIS — A41.9: Primary | ICD-10-CM

## 2019-10-04 DIAGNOSIS — I42.9: ICD-10-CM

## 2019-10-04 DIAGNOSIS — R10.9: ICD-10-CM

## 2019-10-04 DIAGNOSIS — N17.9: ICD-10-CM

## 2019-10-04 DIAGNOSIS — N32.0: ICD-10-CM

## 2019-10-04 DIAGNOSIS — E83.42: ICD-10-CM

## 2019-10-04 DIAGNOSIS — I73.9: ICD-10-CM

## 2019-10-04 DIAGNOSIS — I48.91: ICD-10-CM

## 2019-10-04 DIAGNOSIS — E10.65: ICD-10-CM

## 2019-10-04 DIAGNOSIS — E10.22: ICD-10-CM

## 2019-10-04 DIAGNOSIS — Z89.422: ICD-10-CM

## 2019-10-04 LAB
ADD MANUAL DIFF: YES
ALBUMIN SERPL-MCNC: 2.9 G/DL (ref 3.4–5)
ALBUMIN/GLOB SERPL: 0.6 {RATIO} (ref 1–2.7)
ALP SERPL-CCNC: 100 U/L (ref 46–116)
ALT SERPL-CCNC: 26 U/L (ref 12–78)
ANION GAP SERPL CALC-SCNC: 13 MMOL/L (ref 5–15)
APPEARANCE UR: CLEAR
APTT PPP: (no result) S
AST SERPL-CCNC: 39 U/L (ref 15–37)
BILIRUB DIRECT SERPL-MCNC: 0.1 MG/DL (ref 0–0.3)
BILIRUB SERPL-MCNC: 1.3 MG/DL (ref 0.2–1)
BUN SERPL-MCNC: 27 MG/DL (ref 7–18)
CALCIUM SERPL-MCNC: 8.7 MG/DL (ref 8.5–10.1)
CHLORIDE SERPL-SCNC: 101 MMOL/L (ref 98–107)
CO2 SERPL-SCNC: 21 MMOL/L (ref 21–32)
CREAT SERPL-MCNC: 2 MG/DL (ref 0.55–1.3)
ERYTHROCYTE [DISTWIDTH] IN BLOOD BY AUTOMATED COUNT: 13.6 % (ref 11.6–14.8)
GLOBULIN SER-MCNC: 4.5 G/DL
GLUCOSE UR STRIP-MCNC: (no result) MG/DL
HCT VFR BLD CALC: 35.6 % (ref 42–52)
HGB BLD-MCNC: 11.5 G/DL (ref 14.2–18)
KETONES UR QL STRIP: NEGATIVE
LEUKOCYTE ESTERASE UR QL STRIP: NEGATIVE
MCV RBC AUTO: 90 FL (ref 80–99)
NITRITE UR QL STRIP: NEGATIVE
PH UR STRIP: 5 [PH] (ref 4.5–8)
PLATELET # BLD: 247 K/UL (ref 150–450)
POTASSIUM SERPL-SCNC: 5 MMOL/L (ref 3.5–5.1)
PROT UR QL STRIP: (no result)
RBC # BLD AUTO: 3.97 M/UL (ref 4.7–6.1)
SODIUM SERPL-SCNC: 134 MMOL/L (ref 136–145)
SP GR UR STRIP: 1.01 (ref 1–1.03)
UROBILINOGEN UR-MCNC: NORMAL MG/DL (ref 0–1)
WBC # BLD AUTO: 18.8 K/UL (ref 4.8–10.8)

## 2019-10-04 PROCEDURE — 81003 URINALYSIS AUTO W/O SCOPE: CPT

## 2019-10-04 PROCEDURE — 82553 CREATINE MB FRACTION: CPT

## 2019-10-04 PROCEDURE — 82550 ASSAY OF CK (CPK): CPT

## 2019-10-04 PROCEDURE — 87081 CULTURE SCREEN ONLY: CPT

## 2019-10-04 PROCEDURE — 87070 CULTURE OTHR SPECIMN AEROBIC: CPT

## 2019-10-04 PROCEDURE — 96375 TX/PRO/DX INJ NEW DRUG ADDON: CPT

## 2019-10-04 PROCEDURE — 81001 URINALYSIS AUTO W/SCOPE: CPT

## 2019-10-04 PROCEDURE — 80162 ASSAY OF DIGOXIN TOTAL: CPT

## 2019-10-04 PROCEDURE — 94660 CPAP INITIATION&MGMT: CPT

## 2019-10-04 PROCEDURE — 85379 FIBRIN DEGRADATION QUANT: CPT

## 2019-10-04 PROCEDURE — 80202 ASSAY OF VANCOMYCIN: CPT

## 2019-10-04 PROCEDURE — 86140 C-REACTIVE PROTEIN: CPT

## 2019-10-04 PROCEDURE — 82977 ASSAY OF GGT: CPT

## 2019-10-04 PROCEDURE — 86376 MICROSOMAL ANTIBODY EACH: CPT

## 2019-10-04 PROCEDURE — 89050 BODY FLUID CELL COUNT: CPT

## 2019-10-04 PROCEDURE — 84484 ASSAY OF TROPONIN QUANT: CPT

## 2019-10-04 PROCEDURE — 93306 TTE W/DOPPLER COMPLETE: CPT

## 2019-10-04 PROCEDURE — 83550 IRON BINDING TEST: CPT

## 2019-10-04 PROCEDURE — 85730 THROMBOPLASTIN TIME PARTIAL: CPT

## 2019-10-04 PROCEDURE — 82803 BLOOD GASES ANY COMBINATION: CPT

## 2019-10-04 PROCEDURE — 85007 BL SMEAR W/DIFF WBC COUNT: CPT

## 2019-10-04 PROCEDURE — 83735 ASSAY OF MAGNESIUM: CPT

## 2019-10-04 PROCEDURE — 93005 ELECTROCARDIOGRAM TRACING: CPT

## 2019-10-04 PROCEDURE — 82746 ASSAY OF FOLIC ACID SERUM: CPT

## 2019-10-04 PROCEDURE — 84436 ASSAY OF TOTAL THYROXINE: CPT

## 2019-10-04 PROCEDURE — 94664 DEMO&/EVAL PT USE INHALER: CPT

## 2019-10-04 PROCEDURE — 84443 ASSAY THYROID STIM HORMONE: CPT

## 2019-10-04 PROCEDURE — 80048 BASIC METABOLIC PNL TOTAL CA: CPT

## 2019-10-04 PROCEDURE — 36415 COLL VENOUS BLD VENIPUNCTURE: CPT

## 2019-10-04 PROCEDURE — 84100 ASSAY OF PHOSPHORUS: CPT

## 2019-10-04 PROCEDURE — 82728 ASSAY OF FERRITIN: CPT

## 2019-10-04 PROCEDURE — 85651 RBC SED RATE NONAUTOMATED: CPT

## 2019-10-04 PROCEDURE — 36600 WITHDRAWAL OF ARTERIAL BLOOD: CPT

## 2019-10-04 PROCEDURE — 83540 ASSAY OF IRON: CPT

## 2019-10-04 PROCEDURE — 85025 COMPLETE CBC W/AUTO DIFF WBC: CPT

## 2019-10-04 PROCEDURE — 82962 GLUCOSE BLOOD TEST: CPT

## 2019-10-04 PROCEDURE — 96365 THER/PROPH/DIAG IV INF INIT: CPT

## 2019-10-04 PROCEDURE — 71045 X-RAY EXAM CHEST 1 VIEW: CPT

## 2019-10-04 PROCEDURE — 82248 BILIRUBIN DIRECT: CPT

## 2019-10-04 PROCEDURE — 87040 BLOOD CULTURE FOR BACTERIA: CPT

## 2019-10-04 PROCEDURE — 80053 COMPREHEN METABOLIC PANEL: CPT

## 2019-10-04 PROCEDURE — 74176 CT ABD & PELVIS W/O CONTRAST: CPT

## 2019-10-04 PROCEDURE — 82607 VITAMIN B-12: CPT

## 2019-10-04 PROCEDURE — 83690 ASSAY OF LIPASE: CPT

## 2019-10-04 PROCEDURE — 99291 CRITICAL CARE FIRST HOUR: CPT

## 2019-10-04 PROCEDURE — 87086 URINE CULTURE/COLONY COUNT: CPT

## 2019-10-04 PROCEDURE — 93970 EXTREMITY STUDY: CPT

## 2019-10-04 PROCEDURE — 84481 FREE ASSAY (FT-3): CPT

## 2019-10-04 PROCEDURE — 83605 ASSAY OF LACTIC ACID: CPT

## 2019-10-04 PROCEDURE — 96376 TX/PRO/DX INJ SAME DRUG ADON: CPT

## 2019-10-04 PROCEDURE — 87205 SMEAR GRAM STAIN: CPT

## 2019-10-04 PROCEDURE — 76700 US EXAM ABDOM COMPLETE: CPT

## 2019-10-04 PROCEDURE — 84439 ASSAY OF FREE THYROXINE: CPT

## 2019-10-04 PROCEDURE — 76770 US EXAM ABDO BACK WALL COMP: CPT

## 2019-10-04 PROCEDURE — 5A09357 ASSISTANCE WITH RESPIRATORY VENTILATION, LESS THAN 24 CONSECUTIVE HOURS, CONTINUOUS POSITIVE AIRWAY PRESSURE: ICD-10-PCS

## 2019-10-04 PROCEDURE — 83036 HEMOGLOBIN GLYCOSYLATED A1C: CPT

## 2019-10-04 PROCEDURE — 80061 LIPID PANEL: CPT

## 2019-10-04 PROCEDURE — 83880 ASSAY OF NATRIURETIC PEPTIDE: CPT

## 2019-10-04 PROCEDURE — 84550 ASSAY OF BLOOD/URIC ACID: CPT

## 2019-10-04 RX ADMIN — INSULIN DETEMIR SCH UNITS: 100 INJECTION, SOLUTION SUBCUTANEOUS at 12:05

## 2019-10-04 RX ADMIN — INSULIN ASPART SCH UNITS: 100 INJECTION, SOLUTION INTRAVENOUS; SUBCUTANEOUS at 17:47

## 2019-10-04 RX ADMIN — ASPIRIN 81 MG SCH MG: 81 TABLET ORAL at 11:59

## 2019-10-04 RX ADMIN — INSULIN ASPART SCH UNITS: 100 INJECTION, SOLUTION INTRAVENOUS; SUBCUTANEOUS at 23:45

## 2019-10-04 RX ADMIN — INSULIN ASPART SCH UNITS: 100 INJECTION, SOLUTION INTRAVENOUS; SUBCUTANEOUS at 12:07

## 2019-10-04 NOTE — NUR
NURSE NOTES:

HS care rendered, linen changed, gown changed. Replaced condom cath. Continues to be on NSR; 
Cardizem gtt titrated down to 10mg/h

## 2019-10-04 NOTE — INFECTIOUS DISEASES PROG NOTE
Assessment/Plan


Assessment/Plan








Full consult dictated:





A)


   1) possible sepsis, leukocytosis, sirs, elevated lactic acid level


   2) s/p left foot 2nd toe amputation 


   3) pmh noted


   4) allergies - nkda





P)


   1) vancomycin, cefepime and flagyl 


   2) f/u on cultures, labs and chest x-ray 


   3) thank you





Subjective


Allergies:  


Coded Allergies:  


     No Known Allergies (Unverified , 6/2/16)





Objective


Vital Signs





Last 24 Hour Vital Signs








  Date Time  Temp Pulse Resp B/P (MAP) Pulse Ox O2 Delivery O2 Flow Rate FiO2


 


10/4/19 17:46  124      


 


10/4/19 17:41  114  138/67    


 


10/4/19 15:05  118 18  100   


 


10/4/19 14:50  133      


 


10/4/19 12:31  110 22  100 Facial  35


 


10/4/19 12:00        30


 


10/4/19 12:00      Bi-pap  


 


10/4/19 11:30  151  123/65    


 


10/4/19 11:11  110 22  100 Facial  35


 


10/4/19 10:30        30


 


10/4/19 09:01  109 20  100 Facial  35


 


10/4/19 08:00      Bi-pap  


 


10/4/19 08:00 98.6 94 22 116/62 (80) 96   


 


10/4/19 08:00  93      


 


10/4/19 07:30  105 20  100 Facial  35


 


10/4/19 05:00  114 23  99 Facial  35


 


10/4/19 04:49  103      


 


10/4/19 04:45 99.1 102 22 115/67 (83) 97   


 


10/4/19 04:35      Bi-pap  


 


10/4/19 04:35        30


 


10/4/19 03:30  110 22  100 Facial  35


 


10/4/19 02:32 98.0 110 23 114/58 100 Bi-pap  30


 


10/4/19 02:28        30


 


10/4/19 02:10  120 40  100 Bi-Pap  35


 


10/4/19 02:01  120 40  100 Facial  35


 


10/4/19 00:20 97.7 128 26 144/75 97 Room Air  


 


10/4/19 00:15 97.9 120 26 143/69 (93) 96 Room Air  








Height (Feet):  5


Height (Inches):  6.00


Weight (Pounds):  182





Microbiology








 Date/Time


Source Procedure


Growth Status


 


 


 10/4/19 04:15


Rectum  Received











Laboratory Tests








Test


  10/4/19


00:25 10/4/19


01:06 10/4/19


01:35 10/4/19


01:45


 


Sodium Level


  134 MMOL/L


(136-145)  L 


  


  


 


 


Potassium Level


  5.0 MMOL/L


(3.5-5.1) 


  


  


 


 


Chloride Level


  101 MMOL/L


() 


  


  


 


 


Carbon Dioxide Level


  21 MMOL/L


(21-32) 


  


  


 


 


Anion Gap


  13 mmol/L


(5-15) 


  


  


 


 


Blood Urea Nitrogen


  27 mg/dL


(7-18)  H 


  


  


 


 


Creatinine


  2.0 MG/DL


(0.55-1.30)  H 


  


  


 


 


Estimat Glomerular Filtration


Rate  mL/min (>60)  


  


  


  


 


 


Glucose Level


  462 MG/DL


()  H 


  


  


 


 


Lactic Acid Level


  2.30 mmol/L


(0.4-2.0)  H 


  


  2.40 mmol/L


(0.66-2.22)  H


 


Calcium Level


  8.7 MG/DL


(8.5-10.1) 


  


  


 


 


Total Bilirubin


  1.3 MG/DL


(0.2-1.0)  H 


  


  


 


 


Direct Bilirubin


  0.1 MG/DL


(0.0-0.3) 


  


  


 


 


Aspartate Amino Transf


(AST/SGOT) 39 U/L (15-37)


H 


  


  


 


 


Alanine Aminotransferase


(ALT/SGPT) 26 U/L (12-78)


  


  


  


 


 


Alkaline Phosphatase


  100 U/L


() 


  


  


 


 


Troponin I


  0.266 ng/mL


(0.000-0.056) 


  


  


 


 


Total Protein


  7.4 G/DL


(6.4-8.2) 


  


  


 


 


Albumin


  2.9 G/DL


(3.4-5.0)  L 


  


  


 


 


Globulin 4.5 g/dL     


 


Albumin/Globulin Ratio


  0.6 (1.0-2.7)


L 


  


  


 


 


Pro-B-Type Natriuretic Peptide


  


  40835 pg/mL


(0-125)  H 


  


 


 


White Blood Count


  


  


  18.8 K/UL


(4.8-10.8)  H 


 


 


Red Blood Count


  


  


  3.97 M/UL


(4.70-6.10)  L 


 


 


Hemoglobin


  


  


  11.5 G/DL


(14.2-18.0)  L 


 


 


Hematocrit


  


  


  35.6 %


(42.0-52.0)  L 


 


 


Mean Corpuscular Volume   90 FL (80-99)   


 


Mean Corpuscular Hemoglobin


  


  


  29.1 PG


(27.0-31.0) 


 


 


Mean Corpuscular Hemoglobin


Concent 


  


  32.4 G/DL


(32.0-36.0) 


 


 


Red Cell Distribution Width


  


  


  13.6 %


(11.6-14.8) 


 


 


Platelet Count


  


  


  247 K/UL


(150-450) 


 


 


Mean Platelet Volume


  


  


  7.3 FL


(6.5-10.1) 


 


 


Neutrophils (%) (Auto)


  


  


  % (45.0-75.0)


  


 


 


Lymphocytes (%) (Auto)


  


  


  % (20.0-45.0)


  


 


 


Monocytes (%) (Auto)    % (1.0-10.0)   


 


Eosinophils (%) (Auto)    % (0.0-3.0)   


 


Basophils (%) (Auto)    % (0.0-2.0)   


 


Differential Total Cells


Counted 


  


  100  


  


 


 


Neutrophils % (Manual)   75 % (45-75)   


 


Lymphocytes % (Manual)   15 % (20-45)  L 


 


Monocytes % (Manual)   10 % (1-10)   


 


Eosinophils % (Manual)   0 % (0-3)   


 


Basophils % (Manual)   0 % (0-2)   


 


Band Neutrophils   0 % (0-8)   


 


Platelet Estimate   Adequate   


 


Platelet Morphology   Normal   


 


Test


  10/4/19


03:15 10/4/19


07:45 10/4/19


11:00 10/4/19


11:11


 


Urine Color Pale yellow     


 


Urine Appearance Clear     


 


Urine pH 5 (4.5-8.0)     


 


Urine Specific Gravity


  1.010


(1.005-1.035) 


  


  


 


 


Urine Protein


  2+ (NEGATIVE)


H 


  


  


 


 


Urine Glucose (UA)


  4+ (NEGATIVE)


H 


  


  


 


 


Urine Ketones


  Negative


(NEGATIVE) 


  


  


 


 


Urine Blood


  2+ (NEGATIVE)


H 


  


  


 


 


Urine Nitrite


  Negative


(NEGATIVE) 


  


  


 


 


Urine Bilirubin


  Negative


(NEGATIVE) 


  


  


 


 


Urine Urobilinogen


  Normal MG/DL


(0.0-1.0) 


  


  


 


 


Urine Leukocyte Esterase


  Negative


(NEGATIVE) 


  


  


 


 


Urine RBC


  5-10 /HPF (0 -


0)  H 


  


  


 


 


Urine WBC


  0-2 /HPF (0 -


0) 


  


  


 


 


Urine Squamous Epithelial


Cells Few /LPF


(NONE/OCC) 


  


  


 


 


Urine Bacteria


  Few /HPF


(NONE) 


  


  


 


 


Lactic Acid Level


  


  1.10 mmol/L


(0.4-2.0) 


  


 


 


Thyroid Stimulating Hormone


(TSH) 


  0.207 uiU/mL


(0.358-3.740) 


  


 


 


Free Thyroxine


  


  2.03 NG/DL


(0.76-1.46)  H 


  


 


 


Activated Partial


Thromboplast Time 


  


  34 SEC (23-33)


H 


 


 


Troponin I


  


  


  0.428 ng/mL


(0.000-0.056) 


 


 


Arterial Blood pH


  


  


  


  7.462


(7.350-7.450)


 


Arterial Blood Partial


Pressure CO2 


  


  


  21.3 mmHg


(35.0-45.0)  *L


 


Arterial Blood Partial


Pressure O2 


  


  


  81.5 mmHg


(75.0-100.0)


 


Arterial Blood HCO3


  


  


  


  14.9 mmol/L


(22.0-26.0)  *L


 


Arterial Blood Oxygen


Saturation 


  


  


  96.3 %


()


 


Arterial Blood Base Excess    -7.2 (-2-2)  L


 


Chauncey Test    Positive  











Current Medications








 Medications


  (Trade)  Dose


 Ordered  Sig/Sherly


 Route


 PRN Reason  Start Time


 Stop Time Status Last Admin


Dose Admin


 


 Aspirin


  (ASA)  81 mg  DAILY


 ORAL


   10/4/19 11:15


 11/3/19 11:14  10/4/19 11:59


 


 


 Atorvastatin


 Calcium


  (Lipitor)  20 mg  BEDTIME


 ORAL


   10/4/19 21:00


 11/3/19 20:59   


 


 


 Cefepime HCl 2 gm/


 Dextrose  55 ml @ 


 110 mls/hr  Q24H


 IVPB


   10/5/19 09:00


 10/12/19 08:59   


 


 


 Clopidogrel


 Bisulfate


  (Plavix)  75 mg  DAILY


 ORAL


   10/4/19 11:15


 11/3/19 11:14  10/4/19 11:59


 


 


 Dextrose


  (Dextrose 50%)  25 ml  Q30M  PRN


 IV


 Hypoglycemia  10/4/19 11:15


 11/3/19 11:14   


 


 


 Dextrose


  (Dextrose 50%)  50 ml  Q30M  PRN


 IV


 Hypoglycemia  10/4/19 11:15


 11/3/19 11:14   


 


 


 Digoxin


  (Lanoxin)  0.25 mg  ONCE


 IVP


   10/4/19 21:00


 10/4/19 22:00   


 


 


 Digoxin


  (Lanoxin)  0.25 mg  ONCE


 ORAL


   10/5/19 06:00


 10/5/19 07:00   


 


 


 Diltiazem HCl 125


 mg/Dextrose  125 ml @ 0


 mls/hr  Q24H


 IV


   10/4/19 11:30


 10/5/19 11:29  10/4/19 11:30


 


 


 Famotidine


  (Pepcid I.v.)  20 mg  Q12HR


 IVP


   10/4/19 21:00


 11/3/19 20:59   


 


 


 Furosemide


  (Lasix)  40 mg  EVERY 12  HOURS


 IV


   10/4/19 21:00


 11/3/19 08:59   


 


 


 Heparin Sodium/


 Dextrose  500 ml @ 


 29.801 mls/


 hr  ADJUST PER  PROTOCOL


 IV


   10/4/19 14:00


 11/3/19 13:59  10/4/19 17:32


 


 


 Insulin Aspart


  (NovoLOG)    Q6HR


 SUBQ


   10/4/19 12:00


 11/3/19 11:59  10/4/19 17:47


 


 


 Insulin Detemir


  (Levemir)  10 units  BEDTIME


 SUBQ


   10/4/19 21:00


 11/3/19 20:59   


 


 


 Insulin Detemir


  (Levemir)  20 units  DAILY


 SUBQ


   10/4/19 11:15


 11/3/19 11:14  10/4/19 12:05


 


 


 Metoprolol


 Tartrate


  (Lopressor)  25 mg  TWICE A  DAY


 ORAL


   10/4/19 18:00


 11/3/19 17:59  10/4/19 17:41


 


 


 Metoprolol


 Tartrate


  (Lopressor)  100 mg  BEDTIME


 ORAL


   10/4/19 21:00


 11/3/19 20:59   


 


 


 Vancomycin HCl


  (Vanco rx to


 dose)  1 ea  DAILY  PRN


 MISC


 Per rx protocol  10/5/19 09:00


 11/3/19 07:59   


 


 


 Vancomycin HCl 1


 gm/Dextrose  275 ml @ 


 183.708


 mls/hr  Q24H


 IVPB


   10/5/19 04:00


 10/10/19 03:59   


 

















Eve Milan MD Oct 4, 2019 18:46

## 2019-10-04 NOTE — NUR
NURSE NOTES:

Received pt in no apparent distress; awake, alert, orientedx4; PEREIRA's follow commands. Denies 
pains, denies SOB. Lying on mid tran's position; with ventimask  at .30 fiO2, saturating  
%. NSR on the monitor, pt just converted from Rapid afib. Cardizem gtt infusing at 
15mg/h via LAC, Heparin gtt also running at 18units/kg/h via left wrist area. Bed soaked 
with urine as condom cath leaking. Skin intact. Breath sounds diminished. Plan of care 
explained; will continue to monitor VS and rhythm, Informed Dr Malave of conversion.

## 2019-10-04 NOTE — NUR
*-* INSURANCE *-*



ALL CLINICALS AND REVIEWS HAVE BEEN FAXED TO:



[facility]

TRK# [id number]



F: [phone]



CARE COORDINATOR: JP CAMACHO

P: [phone]

-------------------------------------------------------------------------------

Addendum: 10/04/19 at 1539 by AVINASH BAUER CM

-------------------------------------------------------------------------------

&



[facility]

NCM: [facility]LO[facility]

P: [phone]

F: [phone]

## 2019-10-04 NOTE — CONSULTATION
DATE OF CONSULTATION:  10/04/2019

CARDIOLOGY CONSULTATION



CONSULTING PHYSICIAN:  Tristen Malave M.D.



REFERRING PHYSICIAN:  Sue Hall M.D.



REASON FOR CONSULTATION:  Management of hypertension, congestive heart

failure, and coronary artery disease.



HISTORY OF PRESENT ILLNESS:  The patient is a 74-year-old 

gentleman with history of hypertension, diabetes, and coronary artery

disease, prior myocardial infarction as well as recent toe amputation for

gangrene, who came to emergency room, was feeling weak and not eating

much.  The patient has not been also taking his insulin as he was not able

to eat.  _____ by paramedics, his blood sugar was very high, but he was

started on half-normal saline.  The patient was quite short of breath and

was started on BiPAP.  At time of my evaluation, the patient is in

step-down unit on BiPAP.



REVIEW OF SYSTEMS:  Negative other than what is mentioned in the history of

present illness.



PAST MEDICAL HISTORY:

1. Hypertension.

2. Diabetes.

3. History of coronary artery disease.

4. Peripheral vascular disease, status post right iliac stent on

09/23/2019.

5. Status post recent toe amputation for gangrene.



FAMILY HISTORY:  Noncontributory.



SOCIAL HISTORY:  There is no history of smoking or drinking or drug use.



PHYSICAL EXAMINATION:

VITAL SIGNS:  Show blood pressure of 116/62, pulse 110, respirations 18,

and temperature 98.3.

HEAD AND NECK:  Showed positive JVD.

LUNGS:  Decreased breath sounds and bibasilar rales.

CARDIOVASCULAR:  Shows tachycardic, S1 and S2 with no gallop.

ABDOMEN:  Soft.

EXTREMITIES:  1+ pitting edema.



LABORATORY AND DIAGNOSTIC DATA:  Labs show white count of 18.8, hemoglobin

11.5, hematocrit 35.6, and platelet count 247,000.  Troponin is 0.266.

BNP is 15,388.  Lactic acid is 2.4.  Sodium 134, potassium 5.0, BUN 27,

creatinine 2.9, glucose 462.



ASSESSMENT AND PLAN:

1. Acute shortness of breath and elevated BNP.  We will get an

echocardiogram to evaluate for ejection fraction and wall motion

abnormality.  Start the patient on Lasix 40 mg IV b.i.d.

2. Tachycardia _____ congestive heart failure as well as sepsis with

elevated white count.  The patient is already on cefepime and

vancomycin.

3. Uncontrolled diabetes, on insulin.

4. Severe peripheral vascular disease, status post left second toe

gangrene amputation for osteomyelitis followed by Podiatry and Vascular

Surgery.



Thank you very much for allowing me to participate in the care of this

patient.  Please do not hesitate to contact me for any questions regarding

my evaluation.









  ______________________________________________

  Tristen Malave M.D.





DR:  AMANDA

D:  10/04/2019 10:20

T:  10/04/2019 17:18

JOB#:  [id number]

CC:

## 2019-10-04 NOTE — NUR
RESPIRATORY NOTE: Received pt on ordered BiPAP settings. BiPAP mask removed and assessed 
skin area for any skin damage or redness. No damage or redness noted. BiPAP mask then placed 
back on after assessment. No resp distress noted. BiPAP alarms are on and audible. BiPAP 
plugged into red outlet. Will monitor pt progress.

## 2019-10-04 NOTE — CARDIAC ELECTROPHYSIOLOGY PN
Subjective


Subjective


[id number]


CHF, Tachy 140s, Troponin elevation


Stat ECho pending





Objective





Last 24 Hour Vital Signs








  Date Time  Temp Pulse Resp B/P (MAP) Pulse Ox O2 Delivery O2 Flow Rate FiO2


 


10/4/19 09:01  109 20  100 Facial  35


 


10/4/19 08:00      Bi-pap  


 


10/4/19 08:00 98.6 94 22 116/62 (80) 96   


 


10/4/19 08:00  93      


 


10/4/19 07:30  105 20  100 Facial  35


 


10/4/19 05:00  114 23  99 Facial  35


 


10/4/19 04:49  103      


 


10/4/19 04:45 99.1 102 22 115/67 (83) 97   


 


10/4/19 04:35      Bi-pap  


 


10/4/19 04:35        30


 


10/4/19 03:30  110 22  100 Facial  35


 


10/4/19 02:32 98.0 110 23 114/58 100 Bi-pap  30


 


10/4/19 02:28        30


 


10/4/19 02:10  120 40  100 Bi-Pap  35


 


10/4/19 02:01  120 40  100 Facial  35


 


10/4/19 00:20 97.7 128 26 144/75 97 Room Air  


 


10/4/19 00:15 97.9 120 26 143/69 (93) 96 Room Air  

















Intake and Output  


 


 10/3/19 10/4/19





 19:00 07:00


 


Intake Total  2055 ml


 


Balance  2055 ml


 


  


 


Intake IV Total  2055 ml


 


# Voids  1


 


# Bowel Movements  1











Laboratory Tests








Test


  10/4/19


00:25 10/4/19


01:06 10/4/19


01:35 10/4/19


01:45


 


Sodium Level


  134 MMOL/L


(136-145)  L 


  


  


 


 


Potassium Level


  5.0 MMOL/L


(3.5-5.1) 


  


  


 


 


Chloride Level


  101 MMOL/L


() 


  


  


 


 


Carbon Dioxide Level


  21 MMOL/L


(21-32) 


  


  


 


 


Anion Gap


  13 mmol/L


(5-15) 


  


  


 


 


Blood Urea Nitrogen


  27 mg/dL


(7-18)  H 


  


  


 


 


Creatinine


  2.0 MG/DL


(0.55-1.30)  H 


  


  


 


 


Estimat Glomerular Filtration


Rate  mL/min (>60)  


  


  


  


 


 


Glucose Level


  462 MG/DL


()  H 


  


  


 


 


Lactic Acid Level


  2.30 mmol/L


(0.4-2.0)  H 


  


  2.40 mmol/L


(0.66-2.22)  H


 


Calcium Level


  8.7 MG/DL


(8.5-10.1) 


  


  


 


 


Total Bilirubin


  1.3 MG/DL


(0.2-1.0)  H 


  


  


 


 


Direct Bilirubin


  0.1 MG/DL


(0.0-0.3) 


  


  


 


 


Aspartate Amino Transf


(AST/SGOT) 39 U/L (15-37)


H 


  


  


 


 


Alanine Aminotransferase


(ALT/SGPT) 26 U/L (12-78)


  


  


  


 


 


Alkaline Phosphatase


  100 U/L


() 


  


  


 


 


Troponin I


  0.266 ng/mL


(0.000-0.056) 


  


  


 


 


Total Protein


  7.4 G/DL


(6.4-8.2) 


  


  


 


 


Albumin


  2.9 G/DL


(3.4-5.0)  L 


  


  


 


 


Globulin 4.5 g/dL     


 


Albumin/Globulin Ratio


  0.6 (1.0-2.7)


L 


  


  


 


 


Pro-B-Type Natriuretic Peptide


  


  57637 pg/mL


(0-125)  H 


  


 


 


White Blood Count


  


  


  18.8 K/UL


(4.8-10.8)  H 


 


 


Red Blood Count


  


  


  3.97 M/UL


(4.70-6.10)  L 


 


 


Hemoglobin


  


  


  11.5 G/DL


(14.2-18.0)  L 


 


 


Hematocrit


  


  


  35.6 %


(42.0-52.0)  L 


 


 


Mean Corpuscular Volume   90 FL (80-99)   


 


Mean Corpuscular Hemoglobin


  


  


  29.1 PG


(27.0-31.0) 


 


 


Mean Corpuscular Hemoglobin


Concent 


  


  32.4 G/DL


(32.0-36.0) 


 


 


Red Cell Distribution Width


  


  


  13.6 %


(11.6-14.8) 


 


 


Platelet Count


  


  


  247 K/UL


(150-450) 


 


 


Mean Platelet Volume


  


  


  7.3 FL


(6.5-10.1) 


 


 


Neutrophils (%) (Auto)


  


  


  % (45.0-75.0)


  


 


 


Lymphocytes (%) (Auto)


  


  


  % (20.0-45.0)


  


 


 


Monocytes (%) (Auto)    % (1.0-10.0)   


 


Eosinophils (%) (Auto)    % (0.0-3.0)   


 


Basophils (%) (Auto)    % (0.0-2.0)   


 


Differential Total Cells


Counted 


  


  100  


  


 


 


Neutrophils % (Manual)   75 % (45-75)   


 


Lymphocytes % (Manual)   15 % (20-45)  L 


 


Monocytes % (Manual)   10 % (1-10)   


 


Eosinophils % (Manual)   0 % (0-3)   


 


Basophils % (Manual)   0 % (0-2)   


 


Band Neutrophils   0 % (0-8)   


 


Platelet Estimate   Adequate   


 


Platelet Morphology   Normal   


 


Test


  10/4/19


03:15 10/4/19


07:45 


  


 


 


Urine Color Pale yellow     


 


Urine Appearance Clear     


 


Urine pH 5 (4.5-8.0)     


 


Urine Specific Gravity


  1.010


(1.005-1.035) 


  


  


 


 


Urine Protein


  2+ (NEGATIVE)


H 


  


  


 


 


Urine Glucose (UA)


  4+ (NEGATIVE)


H 


  


  


 


 


Urine Ketones


  Negative


(NEGATIVE) 


  


  


 


 


Urine Blood


  2+ (NEGATIVE)


H 


  


  


 


 


Urine Nitrite


  Negative


(NEGATIVE) 


  


  


 


 


Urine Bilirubin


  Negative


(NEGATIVE) 


  


  


 


 


Urine Urobilinogen


  Normal MG/DL


(0.0-1.0) 


  


  


 


 


Urine Leukocyte Esterase


  Negative


(NEGATIVE) 


  


  


 


 


Urine RBC


  5-10 /HPF (0 -


0)  H 


  


  


 


 


Urine WBC


  0-2 /HPF (0 -


0) 


  


  


 


 


Urine Squamous Epithelial


Cells Few /LPF


(NONE/OCC) 


  


  


 


 


Urine Bacteria


  Few /HPF


(NONE) 


  


  


 


 


Lactic Acid Level


  


  1.10 mmol/L


(0.4-2.0) 


  


 











Microbiology








 Date/Time


Source Procedure


Growth Status


 


 


 10/4/19 04:15


Rectum  Received

















Tristen Malave MD Oct 4, 2019 10:19

## 2019-10-04 NOTE — NUR
NURSE NOTES:

Dr. Malave called the unit- updated with pt's condition - made aware patient's HR remains 
140-150/min Uncontrolled A-Fib with cardizem ar 15mg/min. with new orders given. Also made 
aware of latest Troponin 0.428

## 2019-10-04 NOTE — CARDIOLOGY REPORT
--------------- APPROVED REPORT --------------





EXAM: Two-dimensional and M-mode echocardiogram with Doppler and color Doppler.



INDICATION

Congestive Heart Failure 



M-Mode DIMENSIONS 

IVSd1.3 (0.7-1.1cm)Left Atrium (MM)4.4 (1.6-4.0cm)

LVDd4.2 (3.5-5.6cm)Aortic Root3.3 (2.0-3.7cm)

PWd1.2 (0.7-1.1cm)Aortic Cusp Exc.1.8 (1.5-2.0cm)

IVSs1.8 cm

LVDs3.4 (2.5-4.0cm)

PWs1.3 cm





Normal left ventricular chamber size.

Global left ventricular hypokinesis .

Left ventricular ejection fraction estimated to be 30-35 %.

Mild left atrial enlargement .

Right cardiac chamber sizes are within normal limits. 

Focal aortic valve sclerosis with adequate cusp excursion.

Thickened mitral valve leaflets with normal excursion.

Mitral annulus and aortic root calcification.

Pulmonic valve not well visualized.

Normal tricuspid valve structure. 

IVC at 2.0cm  without physiologic collapse suggestive of increased RA pressure.



A  color flow and spectral Doppler study was performed and revealed:

No aortic regurgitation.

Moderate mitral regurgitation in AP2.

 Mitral inflow velocities indicates possible pseudo normalization pattern implying 

moderately elevated left atrial pressure (Grade II ).

Mild to moderate tricuspid regurgitation.

Tricuspid  systolic velocities suggests peak right ventricular systolic pressure of 40 

mmHg,consistent with mild pulmonary hypertension.

## 2019-10-04 NOTE — NUR
HAND-OFF: 

Report given to BRANDON Bowles, pt. remains stable and no signs of distress noted- aware to f/u 
with admission orders as doctor has not called back with admitting orders.

## 2019-10-04 NOTE — NUR
NURSE NOTES:

Remains on NSR. Called Dr Malave again as he did not returned the call the first time to 
inform him of the conversion. IV site intact. Denies pain but c/o frequent urination. Pt had 
Lasix 40mg IVP an hour ago. Condom cath remains patent. Rendered NPO as ordered except for 
meds. Denies CP; SOB

## 2019-10-04 NOTE — NUR
ED Nurse Note:



pt brought in by ambulance for elevated blood sugar. per report, blood sugar is 
above 700,. pt is alert x4. [place] with [place] [facility] [place]

## 2019-10-04 NOTE — EMERGENCY ROOM REPORT
History of Present Illness


General


Chief Complaint:  Abnormal Labs


Source:  Patient





Present Illness


HPI


Is a 74-year-old male with a history of diabetes, hypertension, CAD and recent 

toe amputation for gangrene.  He was doing well until few days ago.  Since then

, he is feeling weak and not eating.  Has not taken his insulin because only 

able to eat a few strawberries today.  Presents with weakness and EMS said his 

blood sugar was very high and start him on a liter of normal saline.  Patient 

denies any chest pain.  Felt short of breath.  No nausea no vomiting.  No fever 

or chills.  No pain.


Allergies:  


Coded Allergies:  


     No Known Allergies (Unverified , 6/2/16)





Patient History


Past Medical History:  see triage record, old chart reviewed, DM, HTN, MI, CAD


Past Surgical History:  other


Pertinent Family History:  none


Social History:  Denies: smoking


Immunizations:  other


Reviewed Nursing Documentation:  PMH: Agreed; PSxH: Agreed





Nursing Documentation-PMH


Hx Cardiac Problems:  Yes - RT Iliac stent 9/23/2019


Hx Hypertension:  Yes


Hx COPD:  No - BPH


Hx Diabetes:  Yes


Hx Cancer:  No


Hx Gastrointestinal Problems:  No


Hx Neurological Problems:  No





Review of Systems


Constitutional:  Reports: malaise, weakness


Eye:  Denies: eye pain, blurred vision


ENT:  Denies: ear pain, nose congestion, throat swelling


Respiratory:  Reports: shortness of breath


Cardiovascular:  Denies: chest pain, palpitations


Gastrointestinal:  Denies: abdominal pain, diarrhea, nausea, vomiting


Musculoskeletal:  Denies: back pain, joint pain


Skin:  Denies: rash


Neurological:  Denies: headache, numbness


Endocrine:  Denies: increased thirst, increased urine


Hematologic/Lymphatic:  Denies: easy bruising


All Other Systems:  negative except mentioned in HPI





Physical Exam





Vital Signs








  Date Time  Temp Pulse Resp B/P (MAP) Pulse Ox O2 Delivery O2 Flow Rate FiO2


 


10/4/19 00:15 97.9 120 26 143/69 (93) 96 Room Air  





Vitals with tachycardia


Sp02 EP Interpretation:  reviewed, normal


General Appearance:  alert, mild distress, Chronically Ill


Head:  normocephalic, atraumatic


Eyes:  bilateral eye PERRL, bilateral eye EOMI


ENT:  hearing grossly normal, normal pharynx


Neck:  full range of motion, supple, no meningismus


Respiratory:  chest non-tender, accessory muscle use


Cardiovascular #1:  regular rate, rhythm, no murmur, tachycardia


Gastrointestinal:  normal bowel sounds, non tender, no mass, no organomegaly, 

no bruit, non-distended


Musculoskeletal:  back normal, normal range of motion, other - Bilateral 1+ 

pitting edema.  Left foot: There is erythema to the surgical site of the second 

toe.  No drainage.


Neurologic:  alert, oriented x3


Psychiatric:  mood/affect normal





Procedures


Critical Care Time


Critical Care Time


Critical care is mandated in this patient who presented with uncontrolled 

diabetes and pulmonary edema.  Patient require my urgent intervention to 

attenuate the risks of respiratory collapse which may lead to cardiovascular 

collapse and death.  Critical care time is 35 minutes excluding any reportable 

procedure.  Critical care time included evaluation, multiple reevaluation, 

looking at old charts, interpreting laboratory and diagnostic data, discussing 

case with patient and family and consultants, and charting.





Medical Decision Making


Diagnostic Impression:  


 Primary Impression:  


 Acute exacerbation of CHF (congestive heart failure)


 Qualified Codes:  I50.9 - Heart failure, unspecified


 Additional Impressions:  


 Sepsis


 Qualified Codes:  A41.9 - Sepsis, unspecified organism; R65.20 - Severe sepsis 

without septic shock; J96.01 - Acute respiratory failure with hypoxia


 Cellulitis in diabetic foot


 Hyperglycemia due to type 1 diabetes mellitus


 MERLYN (acute kidney injury)


 ACS (acute coronary syndrome)


 Anemia, unspecified


 Qualified Codes:  D64.9 - Anemia, unspecified


ER Course


Patient presents with weakness and tachycardia.  His blood glucose was very 

high.  I will order 1 additional liter of IV fluid.  After about order of the 

IV fluid, he developed increasing shortness of breath.  On reexamination he has 

rales now.  I ordered a chest x-ray which show CHF.  Patient was given IV 

Lasix.  Also put him on BiPAP.  This seemed to help with his dyspnea.


Lab Results Impression


With elevated BNP and glucose


EKG Diagnostic Results


Rate:  tachycardiac


Rhythm:  NSR


ST Segments:  other - NSST changes





Rhythm Strip Diag. Results


EP Interpretation:  yes


Rate:  130


Rhythm:  NSR, no PVC's, no ectopy





Chest X-Ray Diagnostic Results


Chest X-Ray Diagnostic Results :  


   Chest X-Ray Ordered:  Yes


   # of Views/Limited/Complete:  1 View


   Indication:  Shortness of Breath


   EP Interpretation:  Yes


   Interpretation:  no pneumothorax, other - b/l small effusion. chf


   Impression:  Other - chf


   Electronically Signed by:  Wayne Singh MD





Last Vital Signs








  Date Time  Temp Pulse Resp B/P (MAP) Pulse Ox O2 Delivery O2 Flow Rate FiO2


 


10/4/19 00:20 97.7 128 26 144/75 97 Room Air  








Status:  improved


Disposition:  ADMITTED AS INPATIENT


Condition:  Serious











Wayne Singh MD Oct 4, 2019 01:43

## 2019-10-04 NOTE — CONSULTATION
DATE OF CONSULTATION:  10/04/2019

INFECTIOUS DISEASES CONSULTATION



CONSULTING PHYSICIAN:  Eve Milan M.D.



ATTENDING PHYSICIAN:  Sue Hall M.D.



REFERRING PHYSICIAN:  Reynaldo Schaffer D.O.



REASON FOR CONSULTATION:  Possible sepsis, elevated lactic acid level,

leukocytosis.



CHIEF COMPLAINT:  The patient's chief complaint coming into the hospital is

CHF.



HISTORY OF PRESENT ILLNESS:  This is a very pleasant 74-year-old male, who

has a history of left foot second toe amputation in the past, who comes

into [facility] with CHF.  The patient was noted to have elevated

white count, lactic acid, and could have sepsis.  Infectious Diseases

consultation was requested.  The patient will be empirically started on

vancomycin, cefepime, and Flagyl.  Chest x-ray was consistent with CHF.

Blood cultures are pending at this time.  UA was fairly unremarkable for

urinary tract infection.  There is a concern that the patient also could

have pneumonia, and there is a risk for pneumonia in addition to CHF.  The

patient will be continued on vancomycin, cefepime, Flagyl.  He is in the

intensive care unit currently.  MAR was noted.  Orders were noted.  Notes

were reviewed.



REVIEW OF SYSTEMS:  Main issue, he is on the breathing mask in the ICU.  He

is not on pressors.  He does not have fevers.  HEAD AND NECK:  No head

pain or neck pain.    CARDIAC:  No chest pain.    GASTROINTESTINAL:  No

nausea, vomiting, or diarrhea.   GENITOURINARY:  He has a An.

PULMONARY:  Short of breath on a breathing mask.  SKIN:  No rash.

EXTREMITIES:  He has some foot pain.  NEUROLOGIC:  No seizures.



PAST MEDICAL HISTORY:  The patient's past medical history includes history

of the following.  The patient has a past medical history of type 2

diabetes, hypertension, CHF, peripheral vascular disease, status post left

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

osteo.  I do not believe he has a history of CVA.  He has a history of

CAD.



FAMILY HISTORY:  Positive for diabetes.



SOCIAL HISTORY:  Positive smoking in the past but he quit.  No alcohol or

drug abuse.



PAST SURGICAL HISTORY:  Positive for cholecystectomy, left foot second toe

amputation.



MEDICATIONS:  Upon reviewing the MAR, he is on the following medications.

He is on vancomycin, cefepime, Flagyl, digoxin, furosemide, insulin,

atorvastatin, metoprolol, famotidine, insulin, diltiazem, IV fluids.

Outside medications noted and reconciliated.



ALLERGIES:  No known drug allergies.



PHYSICAL EXAMINATION:

VITAL SIGNS:  Pulse rate is 124, temperature 98.6, respiratory rate 22,

blood pressure 138/67, saturation 100%, FiO2 35% on breathing mask.

GENERAL:  Alert and responsive.  Some shortness of breath noted.

HEAD AND NECK:  Oral exam, no thrush.  Eyes, no icterus.  Neck is supple.

No JVD.  Normocephalic.

HEART:  Regular.  No murmur or friction rub.  Possible gallop.

LUNGS:  Few bilateral rhonchi, rales, and crackles.

ABDOMEN:  Soft.  Positive bowel sounds.  Nontender.

SKIN:  No rash.

MUSCULOSKELETAL:  No effusion.  Legs are without cellulitis.  No septic

arthritis.

PERIPHERAL VASCULAR:  Left foot second toe amputation site, looks clean and

dry.  He has no cellulitis.

NEUROLOGICAL:  Intact.

LINE SITES:  Without phlebitis.

GENITOURINARY:  He has a An.  Urine is slightly cloudy.



LABORATORY DATA:  Laboratory data is as follows.  UA had 0 to 2 white

cells.  Creatinine 2.0.  LFTs noted.  Lactic acid 2.3.  White count 18.8,

hemoglobin was 11.5.  Chest x-ray showed CHF per the report.  Cultures are

pending.



ASSESSMENT AND PLAN:

1. The patient has possible sepsis, elevated white count or

leukocytosis, elevated lactic acid level, elevated respiratory rate, and

tachycardia.  Continue empiric antibiotics vancomycin, cefepime, Flagyl

for MRSA gram-negative anaerobic coverage.  Give vancomycin, cefepime and

Flagyl for possible sepsis.  Check cultures, labs, chest x-ray.  The

patient could have community-acquired pneumonia also, most likely

aspiration pneumonia.  He does not have any risk factors for that.   More

likely has CHF.  Continue vancomycin, cefepime and Flagyl for sepsis

pending workup.

2. Acute kidney injury.

3. Anemia.

4. Diabetes.

5. Hypertension.

6. Blood sugar and blood pressure treatment for diabetes and

hypertension per primary.

7. History of peripheral vascular disease.

8. 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

9. Possible hyperlipidemia.

10. CHF.

11. PAD.

12. History of stent.

13. CAD.

14. Hypoxia.

15. Continue treatment per primary consultants.

16. No known allergies.

17. Social history is positive for smoking in the past, but quit.

18. Family history is positive for diabetes.

19. MAR was noted.

20. Case discussed with RN.

21. Skin care protocol.

22. ICU care.









  ______________________________________________

  Eve Milan M.D.





DR:  Yanira

D:  10/04/2019 18:59

T:  10/04/2019 23:03

JOB#:  [id number]

CC:

## 2019-10-04 NOTE — NUR
NURSE NOTES:

2nd call left for DR. Allen office- left message with Neal - awaiting for call back from 
doctor for admitting orders- am nurse aware to f/u for admitting orders.

## 2019-10-04 NOTE — PULMONOLGY CRITICAL CARE NOTE
Critical Care - Asmt/Plan


Problems:  


(1) Hypoxemia


(2) Atrial fibrillation with rapid ventricular response


(3) Acute exacerbation of CHF (congestive heart failure)


(4) ACS (acute coronary syndrome)


(5) DM (diabetes mellitus)


(6) HTN (hypertension)


Assessment/Plan:


Optimize pulmonary hygiene/mobilize as tolerated


Titrate down FiO2 to keep SaO2 > 90%


Continue Cefepime/Vanco (D1)


RFS sent to [facility] lab ---> check PCt and VRPM


F/U Cx's


F/U CARDS/EPS recs ---> Dilt gtt, IVUH, diuresis


Monitor volumes and renal function, diuresis as able


Glycemic control


NPO, SLP eval when able


DVT Px: IV[facility]


FC


Prophylaxis:  Heparin - IV[facility]


Disposition:  keep in ICU


Time Spent (Minutes):  40


Notes Reviewed:  cardio


Discussed with:  nurses, consultants





Critical Care - Objective





Last 24 Hour Vital Signs








  Date Time  Temp Pulse Resp B/P (MAP) Pulse Ox O2 Delivery O2 Flow Rate FiO2


 


10/4/19 15:05  118 18  100   


 


10/4/19 14:50  133      


 


10/4/19 12:31  110 22  100 Facial  35


 


10/4/19 12:00        30


 


10/4/19 12:00      Bi-pap  


 


10/4/19 11:30  151  123/65    


 


10/4/19 11:11  110 22  100 Facial  35


 


10/4/19 10:30        30


 


10/4/19 09:01  109 20  100 Facial  35


 


10/4/19 08:00      Bi-pap  


 


10/4/19 08:00 98.6 94 22 116/62 (80) 96   


 


10/4/19 08:00  93      


 


10/4/19 07:30  105 20  100 Facial  35


 


10/4/19 05:00  114 23  99 Facial  35


 


10/4/19 04:49  103      


 


10/4/19 04:45 99.1 102 22 115/67 (83) 97   


 


10/4/19 04:35      Bi-pap  


 


10/4/19 04:35        30


 


10/4/19 03:30  110 22  100 Facial  35


 


10/4/19 02:32 98.0 110 23 114/58 100 Bi-pap  30


 


10/4/19 02:28        30


 


10/4/19 02:10  120 40  100 Bi-Pap  35


 


10/4/19 02:01  120 40  100 Facial  35


 


10/4/19 00:20 97.7 128 26 144/75 97 Room Air  


 


10/4/19 00:15 97.9 120 26 143/69 (93) 96 Room Air  








Status:  awake


Condition:  critical


HEENT:  atraumatic, normocephalic


Neck:  full ROM


Lungs:  rales


Heart:  HR/BP unstable, irregular


Abdomen:  soft, non-tender, active bowel sounds


Extremities:  edema - trace, cyanosis - no, clubbing - no


Micro:





Microbiology








 Date/Time


Source Procedure


Growth Status


 


 


 10/4/19 04:15


Rectum  Received








Accucheck:  308


Blood Sugars:  BS not controlled





Critical Care - Subjective


ROS Limited/Unobtainable:  Yes


ICU Day:  1


Intubation Day:  N/A


Interval Events:


74 M DM, HTN, CAD, toe amputation a/w generalized malaise and SOB c/b AFcRVR 

noted to be in ADHF.  He was initially on BiPAP but now on FM


WCt 18


7.46/21/81/14/96


+ tropinemia   


TSH 0.2 fT4 2.03


Condition:  critical


IV Access:  peripheral


EKG Rhythm:  Atrial Fibrillation


FI02:  35


Vent Support Mode:  BiLevel


I&O:











Intake and Output  


 


 10/3/19 10/4/19





 18:59 06:59


 


Intake Total  2055 ml


 


Balance  2055 ml


 


  


 


Intake IV Total  2055 ml


 


# Voids  1


 


# Bowel Movements  1








Subjective:


+ SOB no cough no wheezing no FC


CXR:


PVC


Labs:





Laboratory Tests








Test


  10/4/19


00:25 10/4/19


01:06 10/4/19


01:35 10/4/19


01:45


 


Sodium Level


  134 MMOL/L


(136-145)  L 


  


  


 


 


Potassium Level


  5.0 MMOL/L


(3.5-5.1) 


  


  


 


 


Chloride Level


  101 MMOL/L


() 


  


  


 


 


Carbon Dioxide Level


  21 MMOL/L


(21-32) 


  


  


 


 


Anion Gap


  13 mmol/L


(5-15) 


  


  


 


 


Blood Urea Nitrogen


  27 mg/dL


(7-18)  H 


  


  


 


 


Creatinine


  2.0 MG/DL


(0.55-1.30)  H 


  


  


 


 


Estimat Glomerular Filtration


Rate  mL/min (>60)  


  


  


  


 


 


Glucose Level


  462 MG/DL


()  H 


  


  


 


 


Lactic Acid Level


  2.30 mmol/L


(0.4-2.0)  H 


  


  2.40 mmol/L


(0.66-2.22)  H


 


Calcium Level


  8.7 MG/DL


(8.5-10.1) 


  


  


 


 


Total Bilirubin


  1.3 MG/DL


(0.2-1.0)  H 


  


  


 


 


Direct Bilirubin


  0.1 MG/DL


(0.0-0.3) 


  


  


 


 


Aspartate Amino Transf


(AST/SGOT) 39 U/L (15-37)


H 


  


  


 


 


Alanine Aminotransferase


(ALT/SGPT) 26 U/L (12-78)


  


  


  


 


 


Alkaline Phosphatase


  100 U/L


() 


  


  


 


 


Troponin I


  0.266 ng/mL


(0.000-0.056) 


  


  


 


 


Total Protein


  7.4 G/DL


(6.4-8.2) 


  


  


 


 


Albumin


  2.9 G/DL


(3.4-5.0)  L 


  


  


 


 


Globulin 4.5 g/dL     


 


Albumin/Globulin Ratio


  0.6 (1.0-2.7)


L 


  


  


 


 


Pro-B-Type Natriuretic Peptide


  


  39041 pg/mL


(0-125)  H 


  


 


 


White Blood Count


  


  


  18.8 K/UL


(4.8-10.8)  H 


 


 


Red Blood Count


  


  


  3.97 M/UL


(4.70-6.10)  L 


 


 


Hemoglobin


  


  


  11.5 G/DL


(14.2-18.0)  L 


 


 


Hematocrit


  


  


  35.6 %


(42.0-52.0)  L 


 


 


Mean Corpuscular Volume   90 FL (80-99)   


 


Mean Corpuscular Hemoglobin


  


  


  29.1 PG


(27.0-31.0) 


 


 


Mean Corpuscular Hemoglobin


Concent 


  


  32.4 G/DL


(32.0-36.0) 


 


 


Red Cell Distribution Width


  


  


  13.6 %


(11.6-14.8) 


 


 


Platelet Count


  


  


  247 K/UL


(150-450) 


 


 


Mean Platelet Volume


  


  


  7.3 FL


(6.5-10.1) 


 


 


Neutrophils (%) (Auto)


  


  


  % (45.0-75.0)


  


 


 


Lymphocytes (%) (Auto)


  


  


  % (20.0-45.0)


  


 


 


Monocytes (%) (Auto)    % (1.0-10.0)   


 


Eosinophils (%) (Auto)    % (0.0-3.0)   


 


Basophils (%) (Auto)    % (0.0-2.0)   


 


Differential Total Cells


Counted 


  


  100  


  


 


 


Neutrophils % (Manual)   75 % (45-75)   


 


Lymphocytes % (Manual)   15 % (20-45)  L 


 


Monocytes % (Manual)   10 % (1-10)   


 


Eosinophils % (Manual)   0 % (0-3)   


 


Basophils % (Manual)   0 % (0-2)   


 


Band Neutrophils   0 % (0-8)   


 


Platelet Estimate   Adequate   


 


Platelet Morphology   Normal   


 


Test


  10/4/19


03:15 10/4/19


07:45 10/4/19


11:00 10/4/19


11:11


 


Urine Color Pale yellow     


 


Urine Appearance Clear     


 


Urine pH 5 (4.5-8.0)     


 


Urine Specific Gravity


  1.010


(1.005-1.035) 


  


  


 


 


Urine Protein


  2+ (NEGATIVE)


H 


  


  


 


 


Urine Glucose (UA)


  4+ (NEGATIVE)


H 


  


  


 


 


Urine Ketones


  Negative


(NEGATIVE) 


  


  


 


 


Urine Blood


  2+ (NEGATIVE)


H 


  


  


 


 


Urine Nitrite


  Negative


(NEGATIVE) 


  


  


 


 


Urine Bilirubin


  Negative


(NEGATIVE) 


  


  


 


 


Urine Urobilinogen


  Normal MG/DL


(0.0-1.0) 


  


  


 


 


Urine Leukocyte Esterase


  Negative


(NEGATIVE) 


  


  


 


 


Urine RBC


  5-10 /HPF (0 -


0)  H 


  


  


 


 


Urine WBC


  0-2 /HPF (0 -


0) 


  


  


 


 


Urine Squamous Epithelial


Cells Few /LPF


(NONE/OCC) 


  


  


 


 


Urine Bacteria


  Few /HPF


(NONE) 


  


  


 


 


Lactic Acid Level


  


  1.10 mmol/L


(0.4-2.0) 


  


 


 


Thyroid Stimulating Hormone


(TSH) 


  0.207 uiU/mL


(0.358-3.740) 


  


 


 


Free Thyroxine


  


  2.03 NG/DL


(0.76-1.46)  H 


  


 


 


Activated Partial


Thromboplast Time 


  


  34 SEC (23-33)


H 


 


 


Troponin I


  


  


  0.428 ng/mL


(0.000-0.056) 


 


 


Arterial Blood pH


  


  


  


  7.462


(7.350-7.450)


 


Arterial Blood Partial


Pressure CO2 


  


  


  21.3 mmHg


(35.0-45.0)  *L


 


Arterial Blood Partial


Pressure O2 


  


  


  81.5 mmHg


(75.0-100.0)


 


Arterial Blood HCO3


  


  


  


  14.9 mmol/L


(22.0-26.0)  *L


 


Arterial Blood Oxygen


Saturation 


  


  


  96.3 %


()


 


Arterial Blood Base Excess    -7.2 (-2-2)  L


 


Chauncey Test    Positive  

















Ace Fraire MD Oct 4, 2019 17:10

## 2019-10-04 NOTE — HISTORY AND PHYSICAL
History of Present Illness


General


Date patient seen:  Oct 4, 2019


Reason for Hospitalization:  Acute Hypoxic Respiratory Failure





Present Illness


HPI


History obtained with assistance of [employer] 





This is a 74 year old male with a PMHx Dm2, HTN, CHF, PAD s/p left iliac stent 

placement 10/25/19 and supsequent left 2nd toe amputation for gangrene/

osteomyelitis, CAD who presented with increased SOB x 5 days. Increased 

orthopnea and lower extremity swelling. Dry cough. no fevers or chills. Also 

with diarrhea, watery 10x per day for past two weeks. No history of MI or heart 

failure. No history of coronary stents. 





Of note patient recently hospitalized last month for L second toe foul smelling 

necrosis, gangrene and osteomyelitis. Was continued on vancomycin, cefepime and 

flagyl until patient underwent LLE angiogram and left iliac? stent on 

approximately 9/25/19. Subsequently had left toe amputated. He does not endorse 

any pain, redness or drainage from the toe. Denies any other rashes, lesions 

ulcers.  





ER course


Patient hypoxic. Placed on BiPAP. WBC 18, lactate 2.3, proBNP 55023, troponin 

0.266 and EKG showed atrial fibrillation with RVR. CXR showed pulmonary edema. 

Patient given IV lasix and transferred to ICU. 





SurgicalHx


Cholecystectomy


Left 2nd toe amputation





Family history


Mother- Dm2





Social history


Tobacco: quit 20 years ago, 20 pack years


Alcohol: quit 20 y ears ago. 20 years


Drugs: none


Allergies:  


Coded Allergies:  


     No Known Allergies (Unverified , 6/2/16)





Medication History


Scheduled


Amlodipine Besylate (Norvasc), 5 MG ORAL DAILY


Aspirin* (Aspirin*), 81 MG ORAL DAILY


Atorvastatin Calcium* (Lipitor*), 20 MG ORAL BEDTIME


Clopidogrel* (Clopidogrel*), 75 MG ORAL DAILY, (Reported)


Metoprolol Tartrate* (Metoprolol Tartrate*), 100 MG ORAL BEDTIME


Omeprazole (Omeprazole), 40 MG ORAL DAILY, (Reported)





Miscellaneous Medications


Bismuth Subsalicylate (Pepto-Bismol), 262 MG PO, (Reported)





Discontinued Medications


Acetaminophen* (Acetaminophen 325MG Tablet*), 650 MG ORAL Q4H PRN


   Discontinued Reason: Therapy completed


Cefepime Hcl/D5w (Cefepime-Dextrose 2 Gm/50 Ml), 2 GM IVPB Q24H, (Reported)


   Discontinued Reason: Therapy completed


Cefepime Hcl/Dextrose, Iso-Osm (Cefepime 2 Gm Injection), 2 GM IV DAILY


   Discontinued Reason: Therapy completed


Chlorhexidine Gluconate* (Hibiclens*), 1 APPLIC TOPIC DAILY@2000


   Discontinued Reason: Therapy completed


Metronidazole* (Flagyl*), 500 MG ORAL EVERY 8 HOURS


   Discontinued Reason: Therapy completed


Metronidazole* (Flagyl*), 500 MG ORAL EVERY 8 HOURS, (Reported)


   Discontinued Reason: Therapy completed


Nph, Human Insulin Isophane* (Novolin N*), 20 UNITS SUBQ BEFORE DINNER


   Discontinued Reason: Therapy completed


Nph, Human Insulin Isophane* (Novolin N*), 40 UNITS SUBQ BEFORE BREAKFAST


   Discontinued Reason: Therapy completed


Ondansetron Odt* (Zofran Odt*), 4 MG BC EVERY 8 HOURS


   Discontinued Reason: Therapy completed


Pantoprazole* (Pantoprazole*), 40 MG ORAL DAILY


   Discontinued Reason: Therapy completed


Polyethylene Glycol 3350 (Miralax), 17 GM ORAL HSPRN PRN


   Discontinued Reason: Therapy completed


Vancomycin HCl in Water (Vancomycin 1,250 mg/12.5 ml Vl), 1.25 GM IV DAILY


   Discontinued Reason: Therapy completed


Vancomycin HCl in Water (Vancomycin 1,250 mg/12.5 ml Vl), 1.25 GM IV DAILY, (

Reported)


   Discontinued Reason: Therapy completed


Vancomycin Hcl (Vancomycin Hcl), 125 MG IVPB DAILY, (Reported)


   Discontinued Reason: Therapy completed


[D50w], 50 ML IV Q30M PRN


   Discontinued Reason: Therapy completed


[D50w], 25 ML IV Q30M PRN


   Discontinued Reason: Therapy completed





Patient History


Healthcare decision maker





Resuscitation status


Full Code


Advanced Directive on File








Review of Systems


Constitutional:  Denies: see HPI, chills, sweats, fever, malaise, weakness, 

other


Eye:  Denies: see HPI, eye pain, blurred vision, tearing, double vision, nose 

pain, nose congestion, acuity changes, discharge, other


ENT:  Denies: see HPI, ear pain, ear discharge, nose pain, nose congestion, 

throat pain, throat swelling, mouth pain, hearing loss, nasal discharge, other


Respiratory:  Reports: cough, orthopnea, shortness of breath; Denies: see HPI, 

stridor, wheezing, ROSS, sputum, other


Cardiovascular:  Reports: edema; Denies: see HPI, chest pain, palpitations, 

syncope, PND, other


Gastrointestinal:  Denies: see HPI, abdominal pain, constipation, diarrhea, 

nausea, vomiting, melena, hematemesis, other


Genitourinary:  Denies: see HPI, discharge, dysuria, frequency, hematuria, pain

, retention, incontinence, urgency, vag bleed/dc, other


Musculoskeletal:  Denies: see HPI, back pain, gout, joint pain, joint swelling, 

muscle pain, muscle stiffness, other


Skin:  Denies: see HPI, rash, change in color, change in hair/nails, dryness, 

lesions, other


Psychiatric:  Denies: see HPI, prior hx, anxiety, depressed feelings, emotional 

problems, SI, HI, hallucinations, other


Neurological:  Denies: see HPI, headache, numbness, paresthesia, seizure, 

tingling, tremors, focal weakness, syncope, dizziness, other


Endocrine:  Denies: see HPI, excessive sweating, flushing, intolerance to 

temperature, increased thirst, increased urine, unexplained weight loss, other


Hematologic/Lymphatic:  Denies: see HPI, anemia, blood clots, easy bleeding, 

easy bruising, swollen glands, diathesis, other





Physical Exam


General Appearance:  WD/WN, no apparent distress, alert - on BiPAP


Lines, tubes and drains:  peripheral


HEENT:  normocephalic, atraumatic


Neck:  non-tender, normal alignment


Respiratory/Chest:  chest wall non-tender, accessory muscle use, other - 

crackles bilaterally


Cardiovascular/Chest:  normal peripheral pulses, normal rate, JVD


Abdomen:  normal bowel sounds, non tender, soft


Extremities:  other - 3+ pitting edema to thigh bilaterally


Skin Exam:  other - left 2nd toe amputation with stiches in place without 

drainage or surrounding erythema


Neurologic:  CNs II-XII grossly normal, no motor/sensory deficits, abnormal gait

, alert, oriented x 3, responsive





Last 24 Hour Vital Signs








  Date Time  Temp Pulse Resp B/P (MAP) Pulse Ox O2 Delivery O2 Flow Rate FiO2


 


10/4/19 15:05  118 18  100   


 


10/4/19 14:50  133      


 


10/4/19 12:31  110 22  100 Facial  35


 


10/4/19 12:00        30


 


10/4/19 12:00      Bi-pap  


 


10/4/19 11:30  151  123/65    


 


10/4/19 11:11  110 22  100 Facial  35


 


10/4/19 10:30        30


 


10/4/19 09:01  109 20  100 Facial  35


 


10/4/19 08:00      Bi-pap  


 


10/4/19 08:00 98.6 94 22 116/62 (80) 96   


 


10/4/19 08:00  93      


 


10/4/19 07:30  105 20  100 Facial  35


 


10/4/19 05:00  114 23  99 Facial  35


 


10/4/19 04:49  103      


 


10/4/19 04:45 99.1 102 22 115/67 (83) 97   


 


10/4/19 04:35      Bi-pap  


 


10/4/19 04:35        30


 


10/4/19 03:30  110 22  100 Facial  35


 


10/4/19 02:32 98.0 110 23 114/58 100 Bi-pap  30


 


10/4/19 02:28        30


 


10/4/19 02:10  120 40  100 Bi-Pap  35


 


10/4/19 02:01  120 40  100 Facial  35


 


10/4/19 00:20 97.7 128 26 144/75 97 Room Air  


 


10/4/19 00:15 97.9 120 26 143/69 (93) 96 Room Air  

















Intake and Output  


 


 10/3/19 10/4/19





 18:59 06:59


 


Intake Total  2055 ml


 


Balance  2055 ml


 


  


 


Intake IV Total  2055 ml


 


# Voids  1


 


# Bowel Movements  1











Laboratory Tests








Test


  10/4/19


00:25 10/4/19


01:06 10/4/19


01:35 10/4/19


01:45


 


Sodium Level


  134 MMOL/L


(136-145)  L 


  


  


 


 


Potassium Level


  5.0 MMOL/L


(3.5-5.1) 


  


  


 


 


Chloride Level


  101 MMOL/L


() 


  


  


 


 


Carbon Dioxide Level


  21 MMOL/L


(21-32) 


  


  


 


 


Anion Gap


  13 mmol/L


(5-15) 


  


  


 


 


Blood Urea Nitrogen


  27 mg/dL


(7-18)  H 


  


  


 


 


Creatinine


  2.0 MG/DL


(0.55-1.30)  H 


  


  


 


 


Estimat Glomerular Filtration


Rate  mL/min (>60)  


  


  


  


 


 


Glucose Level


  462 MG/DL


()  H 


  


  


 


 


Lactic Acid Level


  2.30 mmol/L


(0.4-2.0)  H 


  


  2.40 mmol/L


(0.66-2.22)  H


 


Calcium Level


  8.7 MG/DL


(8.5-10.1) 


  


  


 


 


Total Bilirubin


  1.3 MG/DL


(0.2-1.0)  H 


  


  


 


 


Direct Bilirubin


  0.1 MG/DL


(0.0-0.3) 


  


  


 


 


Aspartate Amino Transf


(AST/SGOT) 39 U/L (15-37)


H 


  


  


 


 


Alanine Aminotransferase


(ALT/SGPT) 26 U/L (12-78)


  


  


  


 


 


Alkaline Phosphatase


  100 U/L


() 


  


  


 


 


Troponin I


  0.266 ng/mL


(0.000-0.056) 


  


  


 


 


Total Protein


  7.4 G/DL


(6.4-8.2) 


  


  


 


 


Albumin


  2.9 G/DL


(3.4-5.0)  L 


  


  


 


 


Globulin 4.5 g/dL     


 


Albumin/Globulin Ratio


  0.6 (1.0-2.7)


L 


  


  


 


 


Pro-B-Type Natriuretic Peptide


  


  01366 pg/mL


(0-125)  H 


  


 


 


White Blood Count


  


  


  18.8 K/UL


(4.8-10.8)  H 


 


 


Red Blood Count


  


  


  3.97 M/UL


(4.70-6.10)  L 


 


 


Hemoglobin


  


  


  11.5 G/DL


(14.2-18.0)  L 


 


 


Hematocrit


  


  


  35.6 %


(42.0-52.0)  L 


 


 


Mean Corpuscular Volume   90 FL (80-99)   


 


Mean Corpuscular Hemoglobin


  


  


  29.1 PG


(27.0-31.0) 


 


 


Mean Corpuscular Hemoglobin


Concent 


  


  32.4 G/DL


(32.0-36.0) 


 


 


Red Cell Distribution Width


  


  


  13.6 %


(11.6-14.8) 


 


 


Platelet Count


  


  


  247 K/UL


(150-450) 


 


 


Mean Platelet Volume


  


  


  7.3 FL


(6.5-10.1) 


 


 


Neutrophils (%) (Auto)


  


  


  % (45.0-75.0)


  


 


 


Lymphocytes (%) (Auto)


  


  


  % (20.0-45.0)


  


 


 


Monocytes (%) (Auto)    % (1.0-10.0)   


 


Eosinophils (%) (Auto)    % (0.0-3.0)   


 


Basophils (%) (Auto)    % (0.0-2.0)   


 


Differential Total Cells


Counted 


  


  100  


  


 


 


Neutrophils % (Manual)   75 % (45-75)   


 


Lymphocytes % (Manual)   15 % (20-45)  L 


 


Monocytes % (Manual)   10 % (1-10)   


 


Eosinophils % (Manual)   0 % (0-3)   


 


Basophils % (Manual)   0 % (0-2)   


 


Band Neutrophils   0 % (0-8)   


 


Platelet Estimate   Adequate   


 


Platelet Morphology   Normal   


 


Test


  10/4/19


03:15 10/4/19


07:45 10/4/19


11:00 10/4/19


11:11


 


Urine Color Pale yellow     


 


Urine Appearance Clear     


 


Urine pH 5 (4.5-8.0)     


 


Urine Specific Gravity


  1.010


(1.005-1.035) 


  


  


 


 


Urine Protein


  2+ (NEGATIVE)


H 


  


  


 


 


Urine Glucose (UA)


  4+ (NEGATIVE)


H 


  


  


 


 


Urine Ketones


  Negative


(NEGATIVE) 


  


  


 


 


Urine Blood


  2+ (NEGATIVE)


H 


  


  


 


 


Urine Nitrite


  Negative


(NEGATIVE) 


  


  


 


 


Urine Bilirubin


  Negative


(NEGATIVE) 


  


  


 


 


Urine Urobilinogen


  Normal MG/DL


(0.0-1.0) 


  


  


 


 


Urine Leukocyte Esterase


  Negative


(NEGATIVE) 


  


  


 


 


Urine RBC


  5-10 /HPF (0 -


0)  H 


  


  


 


 


Urine WBC


  0-2 /HPF (0 -


0) 


  


  


 


 


Urine Squamous Epithelial


Cells Few /LPF


(NONE/OCC) 


  


  


 


 


Urine Bacteria


  Few /HPF


(NONE) 


  


  


 


 


Lactic Acid Level


  


  1.10 mmol/L


(0.4-2.0) 


  


 


 


Thyroid Stimulating Hormone


(TSH) 


  0.207 uiU/mL


(0.358-3.740) 


  


 


 


Free Thyroxine


  


  2.03 NG/DL


(0.76-1.46)  H 


  


 


 


Activated Partial


Thromboplast Time 


  


  34 SEC (23-33)


H 


 


 


Troponin I


  


  


  0.428 ng/mL


(0.000-0.056) 


 


 


Arterial Blood pH


  


  


  


  7.462


(7.350-7.450)


 


Arterial Blood Partial


Pressure CO2 


  


  


  21.3 mmHg


(35.0-45.0)  *L


 


Arterial Blood Partial


Pressure O2 


  


  


  81.5 mmHg


(75.0-100.0)


 


Arterial Blood HCO3


  


  


  


  14.9 mmol/L


(22.0-26.0)  *L


 


Arterial Blood Oxygen


Saturation 


  


  


  96.3 %


()


 


Arterial Blood Base Excess    -7.2 (-2-2)  L


 


Chauncey Test    Positive  











Microbiology








 Date/Time


Source Procedure


Growth Status


 


 


 10/4/19 04:15


Rectum  Received








Height (Feet):  5


Height (Inches):  6.00


Weight (Pounds):  182


Medications





Current Medications








 Medications


  (Trade)  Dose


 Ordered  Sig/Sherly


 Route


 PRN Reason  Start Time


 Stop Time Status Last Admin


Dose Admin


 


 Aspirin


  (ASA)  81 mg  DAILY


 ORAL


   10/4/19 11:15


 11/3/19 11:14  10/4/19 11:59


 


 


 Atorvastatin


 Calcium


  (Lipitor)  20 mg  BEDTIME


 ORAL


   10/4/19 21:00


 11/3/19 20:59   


 


 


 Cefepime HCl 2 gm/


 Dextrose  55 ml @ 


 110 mls/hr  Q24H


 IVPB


   10/5/19 09:00


 10/12/19 08:59   


 


 


 Clopidogrel


 Bisulfate


  (Plavix)  75 mg  DAILY


 ORAL


   10/4/19 11:15


 11/3/19 11:14  10/4/19 11:59


 


 


 Dextrose


  (Dextrose 50%)  25 ml  Q30M  PRN


 IV


 Hypoglycemia  10/4/19 11:15


 11/3/19 11:14   


 


 


 Dextrose


  (Dextrose 50%)  50 ml  Q30M  PRN


 IV


 Hypoglycemia  10/4/19 11:15


 11/3/19 11:14   


 


 


 Digoxin


  (Lanoxin)  0.25 mg  ONCE


 IVP


   10/4/19 17:00


 10/4/19 18:00   


 


 


 Digoxin


  (Lanoxin)  0.25 mg  ONCE


 IVP


   10/4/19 21:00


 10/4/19 22:00   


 


 


 Digoxin


  (Lanoxin)  0.25 mg  ONCE


 ORAL


   10/5/19 06:00


 10/5/19 07:00   


 


 


 Diltiazem HCl 125


 mg/Dextrose  125 ml @ 0


 mls/hr  Q24H


 IV


   10/4/19 11:30


 10/5/19 11:29  10/4/19 11:30


 


 


 Furosemide


  (Lasix)  40 mg  EVERY 12  HOURS


 IV


   10/4/19 21:00


 11/3/19 08:59   


 


 


 Heparin Sodium/


 Dextrose  500 ml @ 


 29.801 mls/


 hr  ADJUST PER  PROTOCOL


 IV


   10/4/19 14:00


 11/3/19 13:59   


 


 


 Insulin Aspart


  (NovoLOG)    Q6HR


 SUBQ


   10/4/19 12:00


 11/3/19 11:59  10/4/19 12:07


 


 


 Insulin Detemir


  (Levemir)  10 units  BEDTIME


 SUBQ


   10/4/19 21:00


 11/3/19 20:59   


 


 


 Insulin Detemir


  (Levemir)  20 units  DAILY


 SUBQ


   10/4/19 11:15


 11/3/19 11:14  10/4/19 12:05


 


 


 Metoprolol


 Tartrate


  (Lopressor)  25 mg  TWICE A  DAY


 ORAL


   10/4/19 18:00


 11/3/19 17:59   


 


 


 Metoprolol


 Tartrate


  (Lopressor)  100 mg  BEDTIME


 ORAL


   10/4/19 21:00


 11/3/19 20:59   


 


 


 Vancomycin HCl


  (Vanco rx to


 dose)  1 ea  DAILY  PRN


 MISC


 Per rx protocol  10/5/19 09:00


 11/3/19 07:59   


 


 


 Vancomycin HCl 1


 gm/Dextrose  275 ml @ 


 183.708


 mls/hr  Q24H


 IVPB


   10/5/19 04:00


 10/10/19 03:59   


 











Assessment/Plan


Problem List:  


(1) HTN (hypertension)


ICD Codes:  I10 - Essential (primary) hypertension


SNOMED:  00988644


(2) DM (diabetes mellitus)


ICD Codes:  E11.9 - Type 2 diabetes mellitus without complications


SNOMED:  94532888


(3) Acute exacerbation of CHF (congestive heart failure)


ICD Codes:  I50.9 - Heart failure, unspecified


SNOMED:  043159282, 57294801877028


Qualifiers:  


   Qualified Codes:  I50.9 - Heart failure, unspecified


(4) Atrial fibrillation with rapid ventricular response


ICD Codes:  I48.91 - Unspecified atrial fibrillation


SNOMED:  097813621392807


(5) Hypoxemia


ICD Codes:  R09.02 - Hypoxemia


SNOMED:  172761132


(6) MERLYN (acute kidney injury)


ICD Codes:  N17.9 - Acute kidney failure, unspecified


SNOMED:  26027528, 3412070


(7) Cellulitis in diabetic foot


ICD Codes:  E11.628 - Type 2 diabetes mellitus with other skin complications; 

L03.119 - Cellulitis of unspecified part of limb


SNOMED:  190297423, 65494719


(8) Sepsis


ICD Codes:  A41.9 - Sepsis, unspecified organism


SNOMED:  47373884, 55834544


Qualifiers:  


   Qualified Codes:  A41.9 - Sepsis, unspecified organism; R65.20 - Severe 

sepsis without septic shock; J96.01 - Acute respiratory failure with hypoxia


Status:  stable


Assessment/Plan:


This is a 74 year old male with a PMHx Dm2, HTN, CHF, PAD s/p left iliac stent 

placement 10/25/19 and subsequent left 2nd toe amputation for gangrene/

osteomyelitis, CAD who presents with acute hypoxic respiratory failure 2/2 CHF 

exacerbation and sepsis. 





#acute hypoxic respiratory failure 2/2 CHF exacerbation- no known history of CHF


- ICU


- telemetry


- Continue BiPAP


- titrate SpO2 to keep O2 >92%


- blood cultures


- sputum culture


- Vancomycin


- Zosyn


- lasix 40mg IV BID


- trend troponins


- echo


- appreciate Cardiology recommendations: Dr. Malave





#Atrial fibrillation with RVR, new diagnosis


- Diltiazem Gtt


- heart rate uncontrolled, add Digoxin


- appreicate cardiology recommendations


- heparin GTT


- Lopressor





#sepsis (WBC, heart rate, respiratory rate) 2/2 pneumonia vs. left 2nd toe 

osteomyelitis vs. C. Diff (diarrhea)


- cultures as above


- antibiotics as above


- patient fluid overloaded, holding off extra fluids for now


- check C. Diff





 #lactic acidosis suspected 2/2 CHF exacerbation vs. sepsis


- trending down to 1


- management as above


- check ESR/CRP





#Acute Renal failure 2/2 CHF exacerbation


- IV diuresis as above


- continue to trend 


- avoid nephrotoxins


- replace lytes





#S/p left 2nd toe amputation due to gangrene/osteomyelitis


#s/p recent left iliac stent on ASA/Plavix


- wound consult


- check ESR/CRP as above





#Hypertension


#hyperlipidemia


#CAD


-hold home amlodipine


- continue metoprolol as above


- ASA/Plavix as above





#Diabetes mellitus type 1 per patient, with hyperglycemia


- accuchecks Q6hr


- NPO for now


- Levemir 20 units QAM and 10 units QPM (home is 25 and 30)


- SSI, average





FENPPx


DVT PPx: Heparin GTT


GI PPX: pepcid


Fluids: none


Diet: NPO, while on bipap, then diabetic diet


Lines: none, may need PICC


PT/OT: pending





Dispo: likely SNF





At the time of my involvement the patient's condition was critical with high 

potential for death and/or physiologic deterioration secondary to acute hypoxic 

respiratory failure and atrial fibrillation as delineated in the note above. 





On the date of service, I spent a total of 65 minutes in the ICU evaluating, 

managing, and providing critical care services to this patient, including time 

spent documenting these activities counseling patient/family, and coordinating 

care. 





Critical care services performed include:


Telemetry review


Hemodynamic measurement interpretation


laboratory date review and interpretation


BiPAP setting review, management, and adjustment


Discussion of care plans with patient


Discussion of patient's care with primary medical team, surgical team, and or 

consulting service


Decision to obtain further radiologic evaluation after consideration of risk/

benefit ratio





Plan outlined above discussed with patient/family, ICU RN, ICU team, and 

involved physicians/consultants











Reynaldo Schaffer D.O. Oct 4, 2019 16:57

## 2019-10-04 NOTE — NUR
NURSE NOTES:

Dr Malave here to see pt.   ordered stat EKG, EKG showed afib with rvr.   ordered to 
transfer to ICU.

## 2019-10-04 NOTE — NUR
NURSE NOTES:

HEPARIN DRIP STARTED AT 18MG/KG/HR. PTT ORDERED FOR 2340 TONIGHT. NASAL SWABS OBTAINED. HR 
117, /82, RR 21.

## 2019-10-04 NOTE — NUR
NURSE NOTES:

left message for DR. Allen exchange- spoke with Rafal- she will leave message for doctor- 
awaiting for call back from doctor for admitting orders.

## 2019-10-04 NOTE — NUR
NURSE NOTES:

Spoke with patient's sister Cydney via phone - made her aware patient got transferred to ICU 
due to fast and irregular HR

## 2019-10-04 NOTE — NUR
NURSE NOTES:

Report received from Curtis TAYLOR.  Pt awake, alert and oriented x 3, able to make needs 
known. Pt SR on cardiac monitor.  Pt currently on bipap 12/5 30%.  LAC 20 G and RH 20 G 
heplock noted and intact.  Left another message at Dr Allen office for admitting orders, 
awaiting call back.  Safety measures in place with bed locked and in lowest position, side 
rails x3 up and bed alarm on.  Will continue to monitor and continue plan of care.

## 2019-10-04 NOTE — NUR
NURSE NOTES:

RECEIVED PT FROM BERNA MORIN FROM [facility] D/T AFIB W/ RVR. /74, , RR24, 02SAT 
100% ON BIAPA 15/5 30%. NO C/O PAIN. [place] SPEAKING. VERY LETHARGIC, WEAK. A/OX3. LUNGS 
DIMINISHED. NO SECRETIONS OR COUGH. ABDOMEN SOFT, NON TENDER. SMALL BM, MUCOID BROWN. PEDAL 
PULSE WEAK AND RADIAL PULSES BOUNDING. SKIN INTACT, SECOND TOE AMPUTEE ON LEFT FOOT. A 
FEBRILE. STANDARD PRECAUTIONS IN PLACE. WILL CONTINUE TO MONITOR PT.

## 2019-10-04 NOTE — NUR
73 YO MALE BIBA FROM HOME TO ER



CC    ABNORMAL LAB, BS ABOVE 700





SI     CHF

        T. 97.8, HR. 120, RR. 26, BP. 143/69

        WBC. 18.8, LACTIC ACID. 2.30, TROP. 0.266, BNP- 17199





IS     NS 1000ML IV X2

        LASIX IV

        REG. INSULIN 10 UNITS IV

        LOVENOX INJ SUB Q

        ASPIRIN PO





*******ADMITTED TO SEAN STATUS*******

***********SEAN STATUS*********



DC PLAN PENDING HOSPITAL STAY

## 2019-10-04 NOTE — DIAGNOSTIC IMAGING REPORT
Indication: Dyspnea

 

Comparison:  8/30/2019

 

A single view chest radiograph was obtained.

 

Findings:

 

There is enlargement of the cardiac silhouette with pulmonary vascular redistribution

and prominence, hazy vessel margins and the suggestion of interstitial edema

consistent with  CHF. Hazy opacity at the lung bases likely small pleural effusions.

The bones are osteopenic.

 

IMPRESSION:

 

Congestive heart failure

## 2019-10-05 VITALS — SYSTOLIC BLOOD PRESSURE: 145 MMHG | DIASTOLIC BLOOD PRESSURE: 49 MMHG

## 2019-10-05 VITALS — SYSTOLIC BLOOD PRESSURE: 142 MMHG | DIASTOLIC BLOOD PRESSURE: 52 MMHG

## 2019-10-05 VITALS — SYSTOLIC BLOOD PRESSURE: 142 MMHG | DIASTOLIC BLOOD PRESSURE: 56 MMHG

## 2019-10-05 VITALS — DIASTOLIC BLOOD PRESSURE: 57 MMHG | SYSTOLIC BLOOD PRESSURE: 124 MMHG

## 2019-10-05 VITALS — DIASTOLIC BLOOD PRESSURE: 66 MMHG | SYSTOLIC BLOOD PRESSURE: 152 MMHG

## 2019-10-05 VITALS — DIASTOLIC BLOOD PRESSURE: 52 MMHG | SYSTOLIC BLOOD PRESSURE: 126 MMHG

## 2019-10-05 VITALS — DIASTOLIC BLOOD PRESSURE: 64 MMHG | SYSTOLIC BLOOD PRESSURE: 146 MMHG

## 2019-10-05 VITALS — SYSTOLIC BLOOD PRESSURE: 148 MMHG | DIASTOLIC BLOOD PRESSURE: 67 MMHG

## 2019-10-05 VITALS — DIASTOLIC BLOOD PRESSURE: 49 MMHG | SYSTOLIC BLOOD PRESSURE: 132 MMHG

## 2019-10-05 VITALS — DIASTOLIC BLOOD PRESSURE: 86 MMHG | SYSTOLIC BLOOD PRESSURE: 131 MMHG

## 2019-10-05 VITALS — SYSTOLIC BLOOD PRESSURE: 141 MMHG | DIASTOLIC BLOOD PRESSURE: 61 MMHG

## 2019-10-05 VITALS — SYSTOLIC BLOOD PRESSURE: 130 MMHG | DIASTOLIC BLOOD PRESSURE: 64 MMHG

## 2019-10-05 VITALS — DIASTOLIC BLOOD PRESSURE: 58 MMHG | SYSTOLIC BLOOD PRESSURE: 142 MMHG

## 2019-10-05 VITALS — DIASTOLIC BLOOD PRESSURE: 60 MMHG | SYSTOLIC BLOOD PRESSURE: 137 MMHG

## 2019-10-05 VITALS — SYSTOLIC BLOOD PRESSURE: 125 MMHG | DIASTOLIC BLOOD PRESSURE: 52 MMHG

## 2019-10-05 VITALS — SYSTOLIC BLOOD PRESSURE: 138 MMHG | DIASTOLIC BLOOD PRESSURE: 53 MMHG

## 2019-10-05 VITALS — DIASTOLIC BLOOD PRESSURE: 60 MMHG | SYSTOLIC BLOOD PRESSURE: 133 MMHG

## 2019-10-05 VITALS — SYSTOLIC BLOOD PRESSURE: 130 MMHG | DIASTOLIC BLOOD PRESSURE: 72 MMHG

## 2019-10-05 VITALS — DIASTOLIC BLOOD PRESSURE: 69 MMHG | SYSTOLIC BLOOD PRESSURE: 147 MMHG

## 2019-10-05 LAB
ADD MANUAL DIFF: NO
ADD MANUAL DIFF: NO
ALBUMIN SERPL-MCNC: 2.2 G/DL (ref 3.4–5)
ALBUMIN SERPL-MCNC: 2.3 G/DL (ref 3.4–5)
ALBUMIN/GLOB SERPL: 0.5 {RATIO} (ref 1–2.7)
ALBUMIN/GLOB SERPL: 0.6 {RATIO} (ref 1–2.7)
ALP SERPL-CCNC: 81 U/L (ref 46–116)
ALP SERPL-CCNC: 86 U/L (ref 46–116)
ALT SERPL-CCNC: 25 U/L (ref 12–78)
ALT SERPL-CCNC: 30 U/L (ref 12–78)
ANION GAP SERPL CALC-SCNC: 11 MMOL/L (ref 5–15)
ANION GAP SERPL CALC-SCNC: 11 MMOL/L (ref 5–15)
AST SERPL-CCNC: 18 U/L (ref 15–37)
AST SERPL-CCNC: 21 U/L (ref 15–37)
BASOPHILS NFR BLD AUTO: 0.5 % (ref 0–2)
BASOPHILS NFR BLD AUTO: 0.8 % (ref 0–2)
BILIRUB SERPL-MCNC: 0.5 MG/DL (ref 0.2–1)
BILIRUB SERPL-MCNC: 0.5 MG/DL (ref 0.2–1)
BUN SERPL-MCNC: 25 MG/DL (ref 7–18)
BUN SERPL-MCNC: 28 MG/DL (ref 7–18)
CALCIUM SERPL-MCNC: 8.3 MG/DL (ref 8.5–10.1)
CALCIUM SERPL-MCNC: 8.5 MG/DL (ref 8.5–10.1)
CHLORIDE SERPL-SCNC: 107 MMOL/L (ref 98–107)
CHLORIDE SERPL-SCNC: 108 MMOL/L (ref 98–107)
CO2 SERPL-SCNC: 20 MMOL/L (ref 21–32)
CO2 SERPL-SCNC: 21 MMOL/L (ref 21–32)
CREAT SERPL-MCNC: 1.9 MG/DL (ref 0.55–1.3)
CREAT SERPL-MCNC: 2 MG/DL (ref 0.55–1.3)
EOSINOPHIL NFR BLD AUTO: 0.3 % (ref 0–3)
EOSINOPHIL NFR BLD AUTO: 0.5 % (ref 0–3)
ERYTHROCYTE [DISTWIDTH] IN BLOOD BY AUTOMATED COUNT: 12.7 % (ref 11.6–14.8)
ERYTHROCYTE [DISTWIDTH] IN BLOOD BY AUTOMATED COUNT: 12.8 % (ref 11.6–14.8)
GLOBULIN SER-MCNC: 4 G/DL
GLOBULIN SER-MCNC: 4.2 G/DL
HCT VFR BLD CALC: 32.3 % (ref 42–52)
HCT VFR BLD CALC: 33.8 % (ref 42–52)
HGB BLD-MCNC: 10.7 G/DL (ref 14.2–18)
HGB BLD-MCNC: 11.2 G/DL (ref 14.2–18)
LYMPHOCYTES NFR BLD AUTO: 12.3 % (ref 20–45)
LYMPHOCYTES NFR BLD AUTO: 8.8 % (ref 20–45)
MCV RBC AUTO: 87 FL (ref 80–99)
MCV RBC AUTO: 88 FL (ref 80–99)
MONOCYTES NFR BLD AUTO: 8.5 % (ref 1–10)
MONOCYTES NFR BLD AUTO: 9.4 % (ref 1–10)
NEUTROPHILS NFR BLD AUTO: 77.9 % (ref 45–75)
NEUTROPHILS NFR BLD AUTO: 81 % (ref 45–75)
PHOSPHATE SERPL-MCNC: 3.3 MG/DL (ref 2.5–4.9)
PLATELET # BLD: 240 K/UL (ref 150–450)
PLATELET # BLD: 284 K/UL (ref 150–450)
POTASSIUM SERPL-SCNC: 3.5 MMOL/L (ref 3.5–5.1)
POTASSIUM SERPL-SCNC: 3.6 MMOL/L (ref 3.5–5.1)
RBC # BLD AUTO: 3.72 M/UL (ref 4.7–6.1)
RBC # BLD AUTO: 3.85 M/UL (ref 4.7–6.1)
SODIUM SERPL-SCNC: 138 MMOL/L (ref 136–145)
SODIUM SERPL-SCNC: 140 MMOL/L (ref 136–145)
WBC # BLD AUTO: 13.7 K/UL (ref 4.8–10.8)
WBC # BLD AUTO: 15.5 K/UL (ref 4.8–10.8)

## 2019-10-05 RX ADMIN — INSULIN DETEMIR SCH UNITS: 100 INJECTION, SOLUTION SUBCUTANEOUS at 09:55

## 2019-10-05 RX ADMIN — INSULIN DETEMIR SCH UNITS: 100 INJECTION, SOLUTION SUBCUTANEOUS at 21:30

## 2019-10-05 RX ADMIN — INSULIN ASPART SCH UNITS: 100 INJECTION, SOLUTION INTRAVENOUS; SUBCUTANEOUS at 23:21

## 2019-10-05 RX ADMIN — METOPROLOL TARTRATE SCH MG: 25 TABLET, FILM COATED ORAL at 17:44

## 2019-10-05 RX ADMIN — ASPIRIN 81 MG SCH MG: 81 TABLET ORAL at 09:44

## 2019-10-05 RX ADMIN — INSULIN ASPART SCH UNITS: 100 INJECTION, SOLUTION INTRAVENOUS; SUBCUTANEOUS at 06:02

## 2019-10-05 RX ADMIN — DILTIAZEM HYDROCHLORIDE SCH MG: 60 TABLET, FILM COATED ORAL at 00:08

## 2019-10-05 RX ADMIN — INSULIN ASPART SCH UNITS: 100 INJECTION, SOLUTION INTRAVENOUS; SUBCUTANEOUS at 12:22

## 2019-10-05 RX ADMIN — DILTIAZEM HYDROCHLORIDE SCH MG: 60 TABLET, FILM COATED ORAL at 05:50

## 2019-10-05 RX ADMIN — INSULIN ASPART SCH UNITS: 100 INJECTION, SOLUTION INTRAVENOUS; SUBCUTANEOUS at 17:53

## 2019-10-05 RX ADMIN — METOPROLOL TARTRATE SCH MG: 25 TABLET, FILM COATED ORAL at 09:45

## 2019-10-05 RX ADMIN — ATORVASTATIN CALCIUM SCH MG: 20 TABLET, FILM COATED ORAL at 20:21

## 2019-10-05 NOTE — GENERAL PROGRESS NOTE
Assessment/Plan


Problem List:  


(1) HTN (hypertension)


ICD Codes:  I10 - Essential (primary) hypertension


SNOMED:  93905738


(2) DM (diabetes mellitus)


ICD Codes:  E11.9 - Type 2 diabetes mellitus without complications


SNOMED:  18609963


(3) Acute exacerbation of CHF (congestive heart failure)


ICD Codes:  I50.9 - Heart failure, unspecified


SNOMED:  278136085, 59898045002859


Qualifiers:  


   Qualified Codes:  I50.9 - Heart failure, unspecified


(4) Atrial fibrillation with rapid ventricular response


ICD Codes:  I48.91 - Unspecified atrial fibrillation


SNOMED:  313272731638809


(5) Hypoxemia


ICD Codes:  R09.02 - Hypoxemia


SNOMED:  305315819


(6) MERLYN (acute kidney injury)


ICD Codes:  N17.9 - Acute kidney failure, unspecified


SNOMED:  90441864, 2192949


(7) Cellulitis in diabetic foot


ICD Codes:  E11.628 - Type 2 diabetes mellitus with other skin complications; 

L03.119 - Cellulitis of unspecified part of limb


SNOMED:  966005736, 63401238


(8) Sepsis


ICD Codes:  A41.9 - Sepsis, unspecified organism


SNOMED:  40163448, 56210971


Qualifiers:  


   Qualified Codes:  A41.9 - Sepsis, unspecified organism; R65.20 - Severe 

sepsis without septic shock; J96.01 - Acute respiratory failure with hypoxia


Status:  stable


Assessment/Plan:


This is a 74 year old male with a PMHx Dm2, HTN, CHF, PAD s/p left iliac stent 

placement 10/25/19 and subsequent left 2nd toe amputation for gangrene/

osteomyelitis, CAD who presents with acute hypoxic respiratory failure 2/2 CHF 

exacerbation and sepsis. 





#acute hypoxic respiratory failure 2/2 systolic CHF exacerbation (new diagnosis)

- no known history of CHF. Also treating for possible pneumonia


- downgrade to telemetry today


- titrate SpO2 to keep O2 >92%


- blood cultures


- sputum culture


- Vancomycin, Cefepime and Flagyl


- lasix 40mg IV BID


- may start afterload reduction tomorrow with hydralazine 25mg PO TID and 

isordil 10mg TID


- ACE-I contraindicated in setting of acute renal failure


- appreciate Cardiology recommendations: Dr. Wright





#Atrial fibrillation with RVR, new diagnosis. - resolved 


CHADS-VASC 5 - risk of stroke 7.2% per year


HASBLED - 3 - risk of bleeding 5.8% per year


> Explained to patient risks and benefits of anticoagulation with Asa/Plavix 

and an anticoagulant. Patient understands the risks of stroke are > than risk 

of bleeding and would like to take the anticoagulant. Will start patient on 

eliquis renal dosing 2.5mg PO BID


- s/p Diltiazem Gtt


- Metoprolol and digoxin for heart rate controlled


- appreicate cardiology recommendations





#sepsis (WBC, heart rate, respiratory rate) 2/2 pneumonia - IMPROVING


#gram + bailey in blood culture- likely contaminant per ID


> C diff negative


> ESR 74, CRP 1.4, unlikely osteomyelitis


- cultures as above


- Repeat blood cultures today


- antibiotics as above


- patient fluid overloaded, holding off extra fluids for now





 #lactic acidosis suspected 2/2 CHF exacerbation vs. sepsis - RESOLVED


- trending down to 1


- management as above


- check ESR/CRP





#Acute Renal failure 2/2 CHF exacerbation


- IV diuresis as above


- continue to trend 


- avoid nephrotoxins


- replace lytes





#S/p left 2nd toe amputation due to gangrene/osteomyelitis


#s/p recent left iliac stent on ASA/Plavix 10 days ago.


> ESR 73, CRP 1.4


- wound consult


- request records from [facility]





#Hypertension


#hyperlipidemia


#CAD


-hold home amlodipine


- continue metoprolol as above


- ASA/Plavix as above





#Diabetes mellitus type 1 per patient, with hyperglycemia


- accuchecks Q6hr


- Diabetic diet


- Increase Levemir 25 units QAM and 15 units QPM (home is 25 and 30)


- SSI, average





FENPPx


DVT PPx: eliquis


GI PPX: pepcid


Fluids: none


Diet: Diabetic diet


Lines: none


PT/OT: pending





Dispo: SNF vs. Home





D/w RN, patient, patient's family, pulmonology, cardiology, and infectious 

disease. I spent 41 minutes on this encounter. >50% spent on counseling and 

care coordination. 





Time of note may not reflect time patient was seen





Subjective


Date patient seen:  Oct 5, 2019


Constitutional:  Denies: no symptoms, chills, diaphoresis, fever, malaise, 

weakness, other


HEENT:  Denies: no symptoms, eye pain, blurred vision, tearing, double vision, 

ear pain, ear discharge, nose pain, nose congestion, throat pain, throat 

swelling, mouth pain, mouth swelling, other


Cardiovascular:  Denies: no symptoms, chest pain, edema, irregular heart rate, 

lightheadedness, palpitations, syncope, other


Respiratory:  Denies: no symptoms, cough, orthopnea, shortness of breath, SOB 

with excertion, SOB at rest, sputum, stridor, wheezing, other


Gastrointestinal/Abdominal:  Denies: no symptoms, abdomen distended, abdominal 

pain, black stools, tarry stools, blood in stool, constipated, diarrhea, 

difficulty swallowing, nausea, poor appetite, poor fluid intake, rectal bleeding

, vomiting, other


Genitourinary:  Denies: no symptoms, burning, discharge, frequency, flank pain, 

hematuria, incontinence, pain, urgency, other


Neurologic/Psychiatric:  Denies: no symptoms, anxiety, depressed, emotional 

problems, headache, numbness, paresthesia, pre-existing deficit, seizure, 

tingling, tremors, weakness, other


Endocrine:  Denies: no symptoms, excessive sweating, flushing, intolerance to 

cold, intolerance to heat, increased hunger, increased thirst, increased urine, 

unexplained weight gain, unexplained weight loss, other


Hematologic/Lymphatic:  Denies: no symptoms, anemia, easy bleeding, easy 

bruising, other


Allergies:  


Coded Allergies:  


     No Known Allergies (Unverified , 6/2/16)


Subjective


Patient titrated off BiPAP yesterday. Down to 3L nasal cannula this morning. 

Feels much better. other vitals stable. updated on new diagnosis of CHF. Denies 

chest pain, palpitations, fever, chills.





Objective





Last 24 Hour Vital Signs








  Date Time  Temp Pulse Resp B/P (MAP) Pulse Ox O2 Delivery O2 Flow Rate FiO2


 


10/5/19 09:45  83  133/60    


 


10/5/19 09:00     98 Nasal Cannula 3.0 32


 


10/5/19 08:00      Bi-pap  


 


10/5/19 07:00  82 21 125/52 (76) 98   


 


10/5/19 06:00  88 18 147/69 (95) 100   


 


10/5/19 05:50  90  145/49    


 


10/5/19 05:50  90      


 


10/5/19 05:00  90 23 145/49 (81) 100   


 


10/5/19 04:00 98.3 82 21 152/66 (94) 98   


 


10/5/19 04:00  82      


 


10/5/19 04:00      Nasal Cannula 3.0 


 


10/5/19 03:00  77 19 132/49 (76) 98   


 


10/5/19 02:00  90 28 142/56 (84) 98   


 


10/5/19 01:00  85 22 142/58 (86) 97   


 


10/5/19 00:30  82 22 137/60 (85) 99   


 


10/5/19 00:08  81  138/53    


 


10/5/19 00:00 99.0 80 23 138/53 (81) 99   


 


10/5/19 00:00      Venturi Mask 4.0 


 


10/5/19 00:00  80      


 


10/4/19 23:30  79 19 139/56 (83) 99   


 


10/4/19 23:00  76 21 132/64 (86) 99   


 


10/4/19 22:30  78 29 132/55 (80) 99   


 


10/4/19 22:00  81 20 144/57 (86) 98   


 


10/4/19 21:40  81  119/49    


 


10/4/19 21:39  81      


 


10/4/19 21:30  77 20 119/49 (72) 100   


 


10/4/19 21:00  85 24 125/92 (103) 95   


 


10/4/19 20:30  90  135/54    


 


10/4/19 20:30  86 24 134/60 (84) 100   


 


10/4/19 20:00  89      


 


10/4/19 20:00      Venturi Mask 4.0 


 


10/4/19 20:00  89 22 133/56 (81) 100   


 


10/4/19 19:30 99.5 87 20 139/54 (82) 100   


 


10/4/19 19:00  83 20 140/66 (90) 99   


 


10/4/19 18:00  107 25 122/69 (86) 99   


 


10/4/19 17:46  124      


 


10/4/19 17:41  114  138/67    


 


10/4/19 17:00  114 21 139/68 (91) 99   


 


10/4/19 16:00  120      


 


10/4/19 16:00       4.0 30


 


10/4/19 16:00      Bi-pap  


 


10/4/19 16:00 98.9 117 21 128/82 (97) 99   


 


10/4/19 15:05  118 18  100   


 


10/4/19 15:05       4.0 30


 


10/4/19 15:00  129 23 118/77 (91) 100   


 


10/4/19 14:50  133      


 


10/4/19 14:00  141 23 110/67 (81) 100   

















Intake and Output  


 


 10/4/19 10/5/19





 18:59 06:59


 


Intake Total 204.801 ml 825.366 ml


 


Output Total 120 ml 700 ml


 


Balance 84.801 ml 125.366 ml


 


  


 


Intake Oral 50 ml 60 ml


 


IV Total 154.801 ml 765.366 ml


 


Output Urine Total 120 ml 700 ml


 


# Voids 1 


 


# Bowel Movements 2 3








Laboratory Tests


10/4/19 18:55: 


D-Dimer 0.76H, Miscellaneous Test 2 [Pending], Troponin I 0.238H


10/4/19 23:42: Activated Partial Thromboplast Time 63H


10/5/19 04:00: 


Troponin I 0.200H, White Blood Count 15.5H, Red Blood Count 3.72L, Hemoglobin 

10.7L, Hematocrit 32.3L, Mean Corpuscular Volume 87, Mean Corpuscular 

Hemoglobin 28.9, Mean Corpuscular Hemoglobin Concent 33.3, Red Cell 

Distribution Width 12.8, Platelet Count 240, Mean Platelet Volume 7.2, 

Neutrophils (%) (Auto) 77.9H, Lymphocytes (%) (Auto) 12.3L, Monocytes (%) (Auto

) 8.5, Eosinophils (%) (Auto) 0.5, Basophils (%) (Auto) 0.8, Sodium Level 140, 

Potassium Level 3.6, Chloride Level 108H, Carbon Dioxide Level 21, Anion Gap 11

, Blood Urea Nitrogen 28H, Creatinine 1.9H, Estimat Glomerular Filtration Rate 

, Glucose Level 188#H, Calcium Level 8.5, Phosphorus Level 3.3, Magnesium Level 

1.6L, Total Bilirubin 0.5, Aspartate Amino Transf (AST/SGOT) 18, Alanine 

Aminotransferase (ALT/SGPT) 25, Alkaline Phosphatase 81, Pro-B-Type Natriuretic 

Peptide 11978F, Total Protein 6.2L, Albumin 2.2L, Globulin 4.0, Albumin/

Globulin Ratio 0.6L, Digoxin Level 2.3H


10/5/19 06:50: Activated Partial Thromboplast Time 139H


Height (Feet):  5


Height (Inches):  6.00


Weight (Pounds):  182


General Appearance:  WD/WN, no apparent distress, alert


EENT:  PERRL/EOMI, normal ENT inspection


Neck:  non-tender, normal alignment, supple


Cardiovascular:  normal peripheral pulses, normal rate, regular rhythm


Respiratory/Chest:  chest wall non-tender, other - crackles at bases bilaterally


Abdomen:  normal bowel sounds, non tender, soft


Extremities:  normal range of motion, other


Neurologic:  CNs II-XII grossly normal, no motor/sensory deficits, abnormal gait

, alert, oriented x 3


Skin:  normal pigmentation, warm/dry











Reynaldo Schaffer D.O. Oct 5, 2019 14:03

## 2019-10-05 NOTE — NUR
NURSE NOTES:

pt in bed, resting no c/o pain. vss . a/ox3. abdomen flat, non tender. bilateral pulses 
bounding. non pitting edema noted on extremities. afebrile. condom cath on. pt had loose bm, 
brown, mucoid at this time. peripheral iv lt wrist and ac 20g. hep drip at 20 units /kg. 
call light in reach, bed alarm on.

## 2019-10-05 NOTE — NUR
NURSE NOTES:

Dr Maalve called back and gave orders to titrate Cardizem to off once po Cardizem started. 
Dose changed for Metoprolol.Remains on NSR; BP stable. Sleeps on and off

## 2019-10-05 NOTE — NUR
NURSE NOTES:

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 Patient had taken off O2, patient's O2 sat 84% on room air at rest. No complaint of SOB. Patient placed on 2L NC at rest, O2 sat 88%. Patient placed on 3L NC at rest,  O2 sat 95%. Educated patient on keeping NC on. Dr. Cari Patel in room and aware. Patient to be discharged with home O2 per Dr. Cari Patel.

## 2019-10-05 NOTE — NUR
NURSE NOTES:

 family at bedside. pt in bed, resting no c/o pain. vss . a/ox3. abdomen flat, non tender. 
bilateral pulses bounding. non pitting edema noted on extremities. afebrile. condom cath 
off, pt wet. cleaned and assisted to reposition. pt had loose bm, brown, mucoid at this 
time. peripheral iv lt wrist and ac 20g. hep drip at 20 units /kg. call light in reach, bed 
alarm on.

## 2019-10-05 NOTE — NUR
NURSE NOTES:

Complete bed bath and linen change done. Condom cath remains intact, total urine collected= 
700ml excluding leaks. Urine clear, light christen. RT downgraded to NC at 3l/m; now saturating 
100%. Denies chest pains, denies SOB. Continues to be on NSR; BP stable. IV sites intact. 
Heparin gtt continues at 20units/kg/h

## 2019-10-05 NOTE — NUR
NURSE NOTES:

Cardizem gtt titrated to off; Cardizem 60mg po given at [facility]. PTT 63; [employer] changed Heparin 
dose to 20units/kg/h; next PTT in 6 hrs. Pt asleep.

## 2019-10-05 NOTE — NUR
NURSE NOTES:

called DR. Allen office spoke with Aarti at [facility] - regarding pt. complaining of 
abdominal pain- awaiting for call back from doctor.

## 2019-10-05 NOTE — INFECTIOUS DISEASES PROG NOTE
Assessment/Plan


Assessment/Plan








Full consult dictated:





A)


   1) possible sepsis, leukocytosis, sirs, elevated lactic acid level


   2) s/p left foot 2nd toe amputation 


   3) pmh noted


   4) allergies - nkda





P)


   1) vancomycin, cefepime and flagyl 


   2) f/u on cultures, labs and chest x-ray 


   3) gram + león in one bottle likely contaminant 


   4) will f/u





Subjective


Constitutional:  Denies: fever


HEENT:  Reports: congestion - less


Respiratory:  Reports: shortness of breath - les


Cardiovascular:  Denies: chest pain


Gastrointestinal/Abdominal:  Denies: nausea, vomiting, diarrhea


Allergies:  


Coded Allergies:  


     No Known Allergies (Unverified , 6/2/16)





Objective


Vital Signs





Last 24 Hour Vital Signs








  Date Time  Temp Pulse Resp B/P (MAP) Pulse Ox O2 Delivery O2 Flow Rate FiO2


 


10/5/19 09:45  83  133/60    


 


10/5/19 09:00     98 Nasal Cannula 3.0 32


 


10/5/19 08:00      Bi-pap  


 


10/5/19 07:00  82 21 125/52 (76) 98   


 


10/5/19 06:00  88 18 147/69 (95) 100   


 


10/5/19 05:50  90  145/49    


 


10/5/19 05:50  90      


 


10/5/19 05:00  90 23 145/49 (81) 100   


 


10/5/19 04:00 98.3 82 21 152/66 (94) 98   


 


10/5/19 04:00  82      


 


10/5/19 04:00      Nasal Cannula 3.0 


 


10/5/19 03:00  77 19 132/49 (76) 98   


 


10/5/19 02:00  90 28 142/56 (84) 98   


 


10/5/19 01:00  85 22 142/58 (86) 97   


 


10/5/19 00:30  82 22 137/60 (85) 99   


 


10/5/19 00:08  81  138/53    


 


10/5/19 00:00 99.0 80 23 138/53 (81) 99   


 


10/5/19 00:00      Venturi Mask 4.0 


 


10/5/19 00:00  80      


 


10/4/19 23:30  79 19 139/56 (83) 99   


 


10/4/19 23:00  76 21 132/64 (86) 99   


 


10/4/19 22:30  78 29 132/55 (80) 99   


 


10/4/19 22:00  81 20 144/57 (86) 98   


 


10/4/19 21:40  81  119/49    


 


10/4/19 21:39  81      


 


10/4/19 21:30  77 20 119/49 (72) 100   


 


10/4/19 21:00  85 24 125/92 (103) 95   


 


10/4/19 20:30  90  135/54    


 


10/4/19 20:30  86 24 134/60 (84) 100   


 


10/4/19 20:00  89      


 


10/4/19 20:00      Venturi Mask 4.0 


 


10/4/19 20:00  89 22 133/56 (81) 100   


 


10/4/19 19:30 99.5 87 20 139/54 (82) 100   


 


10/4/19 19:00  83 20 140/66 (90) 99   


 


10/4/19 18:00  107 25 122/69 (86) 99   


 


10/4/19 17:46  124      


 


10/4/19 17:41  114  138/67    


 


10/4/19 17:00  114 21 139/68 (91) 99   


 


10/4/19 16:00  120      


 


10/4/19 16:00       4.0 30


 


10/4/19 16:00      Bi-pap  


 


10/4/19 16:00 98.9 117 21 128/82 (97) 99   


 


10/4/19 15:05  118 18  100   


 


10/4/19 15:05       4.0 30


 


10/4/19 15:00  129 23 118/77 (91) 100   


 


10/4/19 14:50  133      


 


10/4/19 14:00  141 23 110/67 (81) 100   


 


10/4/19 13:00  144 26 173/67 (102) 100   








Height (Feet):  5


Height (Inches):  6.00


Weight (Pounds):  182


General Appearance:  no acute distress


HEENT:  normocephalic, atraumatic, anicteric


Respiratory/Chest:  crackles/rales, rhonchi - bilaterally


Cardiovascular:  normal rate, regular rhythm


Abdomen:  normal bowel sounds, soft, non tender, no organomegaly





Microbiology








 Date/Time


Source Procedure


Growth Status


 


 


 10/4/19 00:25


Blood Blood Culture - Preliminary


NO GROWTH AFTER 24 HOURS Resulted


 


 10/4/19 00:10


Blood Blood Culture - Preliminary


Gram Positive León Resulted


 


 10/4/19 04:15


Rectum VRE Culture - Final


Enterococcus Faecium - Vre Complete











Laboratory Tests








Test


  10/4/19


18:55 10/4/19


23:42 10/5/19


04:00 10/5/19


06:50


 


D-Dimer


  0.76 mg/L FEU


(0.00-0.49)  H 


  


  


 


 


Miscellaneous Test 2 Pending     


 


Troponin I


  0.238 ng/mL


(0.000-0.056) 


  0.200 ng/mL


(0.000-0.056) 


 


 


Activated Partial


Thromboplast Time 


  63 SEC (23-33)


H 


  139 SEC


(23-33)  H


 


White Blood Count


  


  


  15.5 K/UL


(4.8-10.8)  H 


 


 


Red Blood Count


  


  


  3.72 M/UL


(4.70-6.10)  L 


 


 


Hemoglobin


  


  


  10.7 G/DL


(14.2-18.0)  L 


 


 


Hematocrit


  


  


  32.3 %


(42.0-52.0)  L 


 


 


Mean Corpuscular Volume   87 FL (80-99)   


 


Mean Corpuscular Hemoglobin


  


  


  28.9 PG


(27.0-31.0) 


 


 


Mean Corpuscular Hemoglobin


Concent 


  


  33.3 G/DL


(32.0-36.0) 


 


 


Red Cell Distribution Width


  


  


  12.8 %


(11.6-14.8) 


 


 


Platelet Count


  


  


  240 K/UL


(150-450) 


 


 


Mean Platelet Volume


  


  


  7.2 FL


(6.5-10.1) 


 


 


Neutrophils (%) (Auto)


  


  


  77.9 %


(45.0-75.0)  H 


 


 


Lymphocytes (%) (Auto)


  


  


  12.3 %


(20.0-45.0)  L 


 


 


Monocytes (%) (Auto)


  


  


  8.5 %


(1.0-10.0) 


 


 


Eosinophils (%) (Auto)


  


  


  0.5 %


(0.0-3.0) 


 


 


Basophils (%) (Auto)


  


  


  0.8 %


(0.0-2.0) 


 


 


Sodium Level


  


  


  140 MMOL/L


(136-145) 


 


 


Potassium Level


  


  


  3.6 MMOL/L


(3.5-5.1) 


 


 


Chloride Level


  


  


  108 MMOL/L


()  H 


 


 


Carbon Dioxide Level


  


  


  21 MMOL/L


(21-32) 


 


 


Anion Gap


  


  


  11 mmol/L


(5-15) 


 


 


Blood Urea Nitrogen


  


  


  28 mg/dL


(7-18)  H 


 


 


Creatinine


  


  


  1.9 MG/DL


(0.55-1.30)  H 


 


 


Estimat Glomerular Filtration


Rate 


  


   mL/min (>60)  


  


 


 


Glucose Level


  


  


  188 MG/DL


()  #H 


 


 


Calcium Level


  


  


  8.5 MG/DL


(8.5-10.1) 


 


 


Phosphorus Level


  


  


  3.3 MG/DL


(2.5-4.9) 


 


 


Magnesium Level


  


  


  1.6 MG/DL


(1.8-2.4)  L 


 


 


Total Bilirubin


  


  


  0.5 MG/DL


(0.2-1.0) 


 


 


Aspartate Amino Transf


(AST/SGOT) 


  


  18 U/L (15-37)


  


 


 


Alanine Aminotransferase


(ALT/SGPT) 


  


  25 U/L (12-78)


  


 


 


Alkaline Phosphatase


  


  


  81 U/L


() 


 


 


Pro-B-Type Natriuretic Peptide


  


  


  38165 pg/mL


(0-125)  H 


 


 


Total Protein


  


  


  6.2 G/DL


(6.4-8.2)  L 


 


 


Albumin


  


  


  2.2 G/DL


(3.4-5.0)  L 


 


 


Globulin   4.0 g/dL   


 


Albumin/Globulin Ratio


  


  


  0.6 (1.0-2.7)


L 


 


 


Digoxin Level


  


  


  2.3 NG/ML


(0.5-2.0)  H 


 











Current Medications








 Medications


  (Trade)  Dose


 Ordered  Sig/Sherly


 Route


 PRN Reason  Start Time


 Stop Time Status Last Admin


Dose Admin


 


 Apixaban


  (Eliquis)  2.5 mg  BID


 ORAL


   10/5/19 18:00


 11/4/19 17:59   


 


 


 Aspirin


  (ASA)  81 mg  DAILY


 ORAL


   10/4/19 11:15


 11/3/19 11:14  10/5/19 09:44


 


 


 Atorvastatin


 Calcium


  (Lipitor)  20 mg  BEDTIME


 ORAL


   10/4/19 21:00


 11/3/19 20:59  10/4/19 21:40


 


 


 Cefepime HCl 2 gm/


 Dextrose  55 ml @ 


 110 mls/hr  Q24H


 IVPB


   10/5/19 09:00


 10/12/19 08:59  10/5/19 09:44


 


 


 Clopidogrel


 Bisulfate


  (Plavix)  75 mg  DAILY


 ORAL


   10/4/19 11:15


 11/3/19 11:14  10/5/19 09:45


 


 


 Dextrose


  (Dextrose 50%)  25 ml  Q30M  PRN


 IV


 Hypoglycemia  10/4/19 11:15


 11/3/19 11:14   


 


 


 Dextrose


  (Dextrose 50%)  50 ml  Q30M  PRN


 IV


 Hypoglycemia  10/4/19 11:15


 11/3/19 11:14   


 


 


 Famotidine


  (Pepcid I.v.)  20 mg  Q12HR


 IVP


   10/4/19 21:00


 11/3/19 20:59  10/5/19 09:38


 


 


 Furosemide


  (Lasix)  40 mg  EVERY 12  HOURS


 IV


   10/4/19 21:00


 11/3/19 08:59  10/5/19 09:37


 


 


 Insulin Aspart


  (NovoLOG)    Q6HR


 SUBQ


   10/4/19 12:00


 11/3/19 11:59  10/5/19 12:22


 


 


 Insulin Detemir


  (Levemir)  15 units  BEDTIME


 SUBQ


   10/5/19 21:00


 11/3/19 20:59   


 


 


 Insulin Detemir


  (Levemir)  25 units  DAILY


 SUBQ


   10/6/19 09:00


 11/3/19 11:14   


 


 


 Magnesium Sulfate  100 ml @ 


 100 mls/hr  Q1H


 IVPB


   10/5/19 12:45


 10/5/19 14:44   


 


 


 Metoprolol


 Tartrate


  (Lopressor)  50 mg  TWICE A  DAY


 ORAL


   10/5/19 09:00


 11/3/19 17:59  10/5/19 09:45


 


 


 Metronidazole  100 ml @ 


 100 mls/hr  Q8HR


 IVPB


   10/4/19 20:00


 10/11/19 19:59  10/5/19 05:49


 


 


 Vancomycin HCl


  (Vanco rx to


 dose)  1 ea  DAILY  PRN


 MISC


 Per rx protocol  10/5/19 09:00


 11/3/19 07:59   


 

















Eve Milan MD Oct 5, 2019 12:59

## 2019-10-05 NOTE — DIAGNOSTIC IMAGING REPORT
EXAM:

  US Duplex Bilateral Lower Extremities Veins

 

CLINICAL HISTORY:

  DVT

 

TECHNIQUE:

  Real-time duplex ultrasound scan of the bilateral lower extremity veins 

integrating B-mode two-dimensional vascular structure, Doppler spectral 

analysis, color flow Doppler imaging and compression.

 

COMPARISON:

  None

 

FINDINGS:

  Right deep veins:  Unremarkable.  No DVT in the right common femoral, 

femoral, proximal deep femoral or popliteal veins.  The veins demonstrate 

normal color flow, are normally compressible, with normal phasic flow 

and or augmentation response.

  Right superficial veins:  Unremarkable.  No thrombus in the visualized 

right great saphenous vein.

 

  Left deep veins:  Small nonspecific echogenic structure in the left 

popliteal vein measuring 4 mm.  No DVT in the left common femoral, 

femoral, proximal deep femoral or popliteal veins.  The veins demonstrate 

normal color flow, are normally compressible, with normal phasic flow 

and or augmentation response.

  Left superficial veins:  Unremarkable.  No thrombus in the visualized 

left great saphenous vein.

 

  Soft tissues:  Mild edema in the right calf soft tissues.  No popliteal 

cyst.

 

IMPRESSION:     

  No deep venous thrombosis identified in either lower extremity.

 

  Nonspecific small 4 mm echogenic structure in the left popliteal vein.  

Normal compression.

## 2019-10-05 NOTE — NUR
NURSE NOTES:

Received report from Carlyn TAYLOR,  pt. appears to be stable, A/O x's 3- Bulgarian/ English 
speaking- able to make needs known, no signs or symptoms of acute cardiac or respiratory 
distress noted, bed in lowest position and call light within easy reach, bed in locked 
position and call light within easy reach, cardiac monitor on, pt. appears clean and dry and 
appears to be resting comfortably, pt. appears to be sating well on 3L NC- no distress 
noted, Left AC 20G and lt. wrist 20G both IVs intact and patent, safety measures continued, 
will continued with plan of care.

## 2019-10-05 NOTE — CARDIOLOGY PROGRESS NOTE
Assessment/Plan


Status:  stable


Assessment/Plan


Assessment/Plan





Acute kidney injury.


Anemia.


Diabetes.


Hypertension.


History of peripheral vascular disease.Left foot second toe amputation 

secondary to gangrene and osteo.


hyperlipidemia.


systolic CHF.


CAD sp PCI





Recommendations


TTE reviewed, LVEF 30-35%


Continue with diuresis


CHAO/Cardioversion if unstable


Afterload reduction as BP tolerates


D/c cardizem given low EF


Continue rate control with metoprolol


Caution with digoxin and renal function


Continue DAPT


Continue statin


Continue metoprolol


Heparin gtt


Lasix IV














-Critical care 35 minutes





Subjective


Cardiovascular:  Reports: no symptoms


Respiratory:  Reports: no symptoms


Gastrointestinal/Abdominal:  Reports: no symptoms


Genitourinary:  Reports: no symptoms


Subjective


COVERAGE FOR [place]:





Troponin down trending vitals stable


Echo LVEF 30-35 percent, grade 2 diastolic dysfunction, mild pulmonary 

hypertension





Objective





Last 24 Hour Vital Signs








  Date Time  Temp Pulse Resp B/P (MAP) Pulse Ox O2 Delivery O2 Flow Rate FiO2


 


10/5/19 09:45  83  133/60    


 


10/5/19 09:00     98 Nasal Cannula 3.0 32


 


10/5/19 08:00      Bi-pap  


 


10/5/19 07:00  82 21 125/52 (76) 98   


 


10/5/19 06:00  88 18 147/69 (95) 100   


 


10/5/19 05:50  90  145/49    


 


10/5/19 05:50  90      


 


10/5/19 05:00  90 23 145/49 (81) 100   


 


10/5/19 04:00 98.3 82 21 152/66 (94) 98   


 


10/5/19 04:00  82      


 


10/5/19 04:00      Nasal Cannula 3.0 


 


10/5/19 03:00  77 19 132/49 (76) 98   


 


10/5/19 02:00  90 28 142/56 (84) 98   


 


10/5/19 01:00  85 22 142/58 (86) 97   


 


10/5/19 00:30  82 22 137/60 (85) 99   


 


10/5/19 00:08  81  138/53    


 


10/5/19 00:00 99.0 80 23 138/53 (81) 99   


 


10/5/19 00:00      Venturi Mask 4.0 


 


10/5/19 00:00  80      


 


10/4/19 23:30  79 19 139/56 (83) 99   


 


10/4/19 23:00  76 21 132/64 (86) 99   


 


10/4/19 22:30  78 29 132/55 (80) 99   


 


10/4/19 22:00  81 20 144/57 (86) 98   


 


10/4/19 21:40  81  119/49    


 


10/4/19 21:39  81      


 


10/4/19 21:30  77 20 119/49 (72) 100   


 


10/4/19 21:00  85 24 125/92 (103) 95   


 


10/4/19 20:30  90  135/54    


 


10/4/19 20:30  86 24 134/60 (84) 100   


 


10/4/19 20:00  89      


 


10/4/19 20:00      Venturi Mask 4.0 


 


10/4/19 20:00  89 22 133/56 (81) 100   


 


10/4/19 19:30 99.5 87 20 139/54 (82) 100   


 


10/4/19 19:00  83 20 140/66 (90) 99   


 


10/4/19 18:00  107 25 122/69 (86) 99   


 


10/4/19 17:46  124      


 


10/4/19 17:41  114  138/67    


 


10/4/19 17:00  114 21 139/68 (91) 99   


 


10/4/19 16:00  120      


 


10/4/19 16:00       4.0 30


 


10/4/19 16:00      Bi-pap  


 


10/4/19 16:00 98.9 117 21 128/82 (97) 99   


 


10/4/19 15:05  118 18  100   


 


10/4/19 15:05       4.0 30


 


10/4/19 15:00  129 23 118/77 (91) 100   


 


10/4/19 14:50  133      


 


10/4/19 14:00  141 23 110/67 (81) 100   


 


10/4/19 13:00  144 26 173/67 (102) 100   


 


10/4/19 12:31  110 22  100 Facial  35


 


10/4/19 12:00        30


 


10/4/19 12:00  140      


 


10/4/19 12:00      Bi-pap  


 


10/4/19 12:00 99.0 146 19 120/100 (107) 100   








General Appearance:  no apparent distress, alert


EENT:  PERRL/EOMI, normal ENT inspection, pharynx normal


Neck:  non-tender, normal alignment, supple, normal inspection, no JVD


Rhythm:  NSR


Cardiovascular:  normal peripheral pulses, normal rate, regular rhythm


Respiratory/Chest:  chest wall non-tender, lungs clear, normal breath sounds


Abdomen:  normal bowel sounds, non tender, soft, no mass


Extremities:  normal range of motion, non-tender, normal inspection, no calf 

tenderness, no swelling











Intake and Output  


 


 10/4/19 10/5/19





 19:00 07:00


 


Intake Total 244.602 ml 918.677 ml


 


Output Total 120 ml 700 ml


 


Balance 124.602 ml 218.677 ml


 


  


 


Intake Oral 50 ml 60 ml


 


IV Total 194.602 ml 858.677 ml


 


Output Urine Total 120 ml 700 ml


 


# Voids 1 


 


# Bowel Movements 2 3











Laboratory Tests








Test


  10/4/19


18:55 10/4/19


23:42 10/5/19


04:00 10/5/19


06:50


 


D-Dimer


  0.76 mg/L FEU


(0.00-0.49)  H 


  


  


 


 


Miscellaneous Test 2 Pending     


 


Troponin I


  0.238 ng/mL


(0.000-0.056) 


  0.200 ng/mL


(0.000-0.056) 


 


 


Activated Partial


Thromboplast Time 


  63 SEC (23-33)


H 


  139 SEC


(23-33)  H


 


White Blood Count


  


  


  15.5 K/UL


(4.8-10.8)  H 


 


 


Red Blood Count


  


  


  3.72 M/UL


(4.70-6.10)  L 


 


 


Hemoglobin


  


  


  10.7 G/DL


(14.2-18.0)  L 


 


 


Hematocrit


  


  


  32.3 %


(42.0-52.0)  L 


 


 


Mean Corpuscular Volume   87 FL (80-99)   


 


Mean Corpuscular Hemoglobin


  


  


  28.9 PG


(27.0-31.0) 


 


 


Mean Corpuscular Hemoglobin


Concent 


  


  33.3 G/DL


(32.0-36.0) 


 


 


Red Cell Distribution Width


  


  


  12.8 %


(11.6-14.8) 


 


 


Platelet Count


  


  


  240 K/UL


(150-450) 


 


 


Mean Platelet Volume


  


  


  7.2 FL


(6.5-10.1) 


 


 


Neutrophils (%) (Auto)


  


  


  77.9 %


(45.0-75.0)  H 


 


 


Lymphocytes (%) (Auto)


  


  


  12.3 %


(20.0-45.0)  L 


 


 


Monocytes (%) (Auto)


  


  


  8.5 %


(1.0-10.0) 


 


 


Eosinophils (%) (Auto)


  


  


  0.5 %


(0.0-3.0) 


 


 


Basophils (%) (Auto)


  


  


  0.8 %


(0.0-2.0) 


 


 


Sodium Level


  


  


  140 MMOL/L


(136-145) 


 


 


Potassium Level


  


  


  3.6 MMOL/L


(3.5-5.1) 


 


 


Chloride Level


  


  


  108 MMOL/L


()  H 


 


 


Carbon Dioxide Level


  


  


  21 MMOL/L


(21-32) 


 


 


Anion Gap


  


  


  11 mmol/L


(5-15) 


 


 


Blood Urea Nitrogen


  


  


  28 mg/dL


(7-18)  H 


 


 


Creatinine


  


  


  1.9 MG/DL


(0.55-1.30)  H 


 


 


Estimat Glomerular Filtration


Rate 


  


   mL/min (>60)  


  


 


 


Glucose Level


  


  


  188 MG/DL


()  #H 


 


 


Calcium Level


  


  


  8.5 MG/DL


(8.5-10.1) 


 


 


Phosphorus Level


  


  


  3.3 MG/DL


(2.5-4.9) 


 


 


Magnesium Level


  


  


  1.6 MG/DL


(1.8-2.4)  L 


 


 


Total Bilirubin


  


  


  0.5 MG/DL


(0.2-1.0) 


 


 


Aspartate Amino Transf


(AST/SGOT) 


  


  18 U/L (15-37)


  


 


 


Alanine Aminotransferase


(ALT/SGPT) 


  


  25 U/L (12-78)


  


 


 


Alkaline Phosphatase


  


  


  81 U/L


() 


 


 


Pro-B-Type Natriuretic Peptide


  


  


  43486 pg/mL


(0-125)  H 


 


 


Total Protein


  


  


  6.2 G/DL


(6.4-8.2)  L 


 


 


Albumin


  


  


  2.2 G/DL


(3.4-5.0)  L 


 


 


Globulin   4.0 g/dL   


 


Albumin/Globulin Ratio


  


  


  0.6 (1.0-2.7)


L 


 


 


Digoxin Level


  


  


  2.3 NG/ML


(0.5-2.0)  H 


 











Microbiology








 Date/Time


Source Procedure


Growth Status


 


 


 10/4/19 00:25


Blood Blood Culture - Preliminary


NO GROWTH AFTER 24 HOURS Resulted


 


 10/4/19 00:10


Blood Blood Culture - Preliminary


Gram Positive [place] Resulted


 


 10/4/19 04:15


Rectum VRE Culture - Final


Enterococcus Faecium - Vre Complete

















Reji Wright MD Oct 5, 2019 11:40

## 2019-10-05 NOTE — NUR
NURSE NOTES:

recieved patient report from tobi mendiola from icu, patient came in via hospital bed with family 
member. will follow plan of care.

## 2019-10-05 NOTE — PULMONOLGY CRITICAL CARE NOTE
Critical Care - Asmt/Plan


Problems:  


(1) Hypoxemia


(2) Atrial fibrillation with rapid ventricular response


(3) Acute exacerbation of CHF (congestive heart failure)


(4) ACS (acute coronary syndrome)


(5) DM (diabetes mellitus)


(6) HTN (hypertension)


(7) Gram positive bacterial infection


(8) Acute kidney injury superimposed on CKD


Assessment/Plan:


Optimize pulmonary hygiene/mobilize as tolerated


Titrate down FiO2 to keep SaO2 > 90%


Continue Cefepime/Vanco/Flagyl (D2) per ID


F/U labs sent to CS: PCt and VRPM


F/U CARDS/EPS recs ---> PO MTP, Eliquis, diuresis


Monitor volumes and renal function, diuresis as able


Glycemic control


Aspiration precautions


DVT Px: Eliquis


FC


TTF [facility]


Prophylaxis:  other - Eliquis


Disposition:  transfer to - [facility]


Time Spent (Minutes):  40


Notes Reviewed:  internist, cardio


Discussed with:  nurses, consultants





Critical Care - Objective





Last 24 Hour Vital Signs








  Date Time  Temp Pulse Resp B/P (MAP) Pulse Ox O2 Delivery O2 Flow Rate FiO2


 


10/5/19 09:45  83  133/60    


 


10/5/19 09:00     98 Nasal Cannula 3.0 32


 


10/5/19 08:00      Bi-pap  


 


10/5/19 07:00  82 21 125/52 (76) 98   


 


10/5/19 06:00  88 18 147/69 (95) 100   


 


10/5/19 05:50  90  145/49    


 


10/5/19 05:50  90      


 


10/5/19 05:00  90 23 145/49 (81) 100   


 


10/5/19 04:00 98.3 82 21 152/66 (94) 98   


 


10/5/19 04:00  82      


 


10/5/19 04:00      Nasal Cannula 3.0 


 


10/5/19 03:00  77 19 132/49 (76) 98   


 


10/5/19 02:00  90 28 142/56 (84) 98   


 


10/5/19 01:00  85 22 142/58 (86) 97   


 


10/5/19 00:30  82 22 137/60 (85) 99   


 


10/5/19 00:08  81  138/53    


 


10/5/19 00:00 99.0 80 23 138/53 (81) 99   


 


10/5/19 00:00      Venturi Mask 4.0 


 


10/5/19 00:00  80      


 


10/4/19 23:30  79 19 139/56 (83) 99   


 


10/4/19 23:00  76 21 132/64 (86) 99   


 


10/4/19 22:30  78 29 132/55 (80) 99   


 


10/4/19 22:00  81 20 144/57 (86) 98   


 


10/4/19 21:40  81  119/49    


 


10/4/19 21:39  81      


 


10/4/19 21:30  77 20 119/49 (72) 100   


 


10/4/19 21:00  85 24 125/92 (103) 95   


 


10/4/19 20:30  90  135/54    


 


10/4/19 20:30  86 24 134/60 (84) 100   


 


10/4/19 20:00  89      


 


10/4/19 20:00      Venturi Mask 4.0 


 


10/4/19 20:00  89 22 133/56 (81) 100   


 


10/4/19 19:30 99.5 87 20 139/54 (82) 100   


 


10/4/19 19:00  83 20 140/66 (90) 99   


 


10/4/19 18:00  107 25 122/69 (86) 99   


 


10/4/19 17:46  124      


 


10/4/19 17:41  114  138/67    


 


10/4/19 17:00  114 21 139/68 (91) 99   


 


10/4/19 16:00  120      


 


10/4/19 16:00       4.0 30


 


10/4/19 16:00      Bi-pap  


 


10/4/19 16:00 98.9 117 21 128/82 (97) 99   


 


10/4/19 15:05  118 18  100   


 


10/4/19 15:05       4.0 30


 


10/4/19 15:00  129 23 118/77 (91) 100   


 


10/4/19 14:50  133      


 


10/4/19 14:00  141 23 110/67 (81) 100   








Status:  awake


Condition:  improving


HEENT:  atraumatic, normocephalic


Neck:  full ROM


Lungs:  rales


Heart:  HR/BP stable


Abdomen:  soft, non-tender, active bowel sounds


Extremities:  no C/C/E


Micro:





Microbiology








 Date/Time


Source Procedure


Growth Status


 


 


 10/4/19 00:25


Blood Blood Culture - Preliminary


NO GROWTH AFTER 24 HOURS Resulted


 


 10/4/19 00:10


Blood Blood Culture - Preliminary


Gram Positive [place] Resulted


 


 10/4/19 04:15


Rectum VRE Culture - Final


Enterococcus Faecium - Vre Complete








Accucheck:  328


Blood Sugars:  BS not controlled





Critical Care - Subjective


ROS Limited/Unobtainable:  Yes


ICU Day:  2


Interval Events:


Trop downtrending, Cr better, stable O2 needs


1/2 G+R


Condition:  improving


IV Access:  peripheral


EKG Rhythm:  Atrial Fibrillation - rate controlled


FI02:  32


Vent Support Mode:  BiLevel


Sputum Amount:  None


Fluids:  SLIV


I&O:











Intake and Output  


 


 10/4/19 10/5/19





 19:00 07:00


 


Intake Total 244.602 ml 918.677 ml


 


Output Total 120 ml 700 ml


 


Balance 124.602 ml 218.677 ml


 


  


 


Intake Oral 50 ml 60 ml


 


IV Total 194.602 ml 858.677 ml


 


Output Urine Total 120 ml 700 ml


 


# Voids 1 


 


# Bowel Movements 2 3








Subjective:


+ weak + SOB no cough no wheezing no FC


Labs:





Laboratory Tests








Test


  10/4/19


18:55 10/4/19


23:42 10/5/19


04:00 10/5/19


06:50


 


D-Dimer


  0.76 mg/L FEU


(0.00-0.49)  H 


  


  


 


 


Miscellaneous Test 2 Pending     


 


Troponin I


  0.238 ng/mL


(0.000-0.056) 


  0.200 ng/mL


(0.000-0.056) 


 


 


Activated Partial


Thromboplast Time 


  63 SEC (23-33)


H 


  139 SEC


(23-33)  H


 


White Blood Count


  


  


  15.5 K/UL


(4.8-10.8)  H 


 


 


Red Blood Count


  


  


  3.72 M/UL


(4.70-6.10)  L 


 


 


Hemoglobin


  


  


  10.7 G/DL


(14.2-18.0)  L 


 


 


Hematocrit


  


  


  32.3 %


(42.0-52.0)  L 


 


 


Mean Corpuscular Volume   87 FL (80-99)   


 


Mean Corpuscular Hemoglobin


  


  


  28.9 PG


(27.0-31.0) 


 


 


Mean Corpuscular Hemoglobin


Concent 


  


  33.3 G/DL


(32.0-36.0) 


 


 


Red Cell Distribution Width


  


  


  12.8 %


(11.6-14.8) 


 


 


Platelet Count


  


  


  240 K/UL


(150-450) 


 


 


Mean Platelet Volume


  


  


  7.2 FL


(6.5-10.1) 


 


 


Neutrophils (%) (Auto)


  


  


  77.9 %


(45.0-75.0)  H 


 


 


Lymphocytes (%) (Auto)


  


  


  12.3 %


(20.0-45.0)  L 


 


 


Monocytes (%) (Auto)


  


  


  8.5 %


(1.0-10.0) 


 


 


Eosinophils (%) (Auto)


  


  


  0.5 %


(0.0-3.0) 


 


 


Basophils (%) (Auto)


  


  


  0.8 %


(0.0-2.0) 


 


 


Sodium Level


  


  


  140 MMOL/L


(136-145) 


 


 


Potassium Level


  


  


  3.6 MMOL/L


(3.5-5.1) 


 


 


Chloride Level


  


  


  108 MMOL/L


()  H 


 


 


Carbon Dioxide Level


  


  


  21 MMOL/L


(21-32) 


 


 


Anion Gap


  


  


  11 mmol/L


(5-15) 


 


 


Blood Urea Nitrogen


  


  


  28 mg/dL


(7-18)  H 


 


 


Creatinine


  


  


  1.9 MG/DL


(0.55-1.30)  H 


 


 


Estimat Glomerular Filtration


Rate 


  


   mL/min (>60)  


  


 


 


Glucose Level


  


  


  188 MG/DL


()  #H 


 


 


Calcium Level


  


  


  8.5 MG/DL


(8.5-10.1) 


 


 


Phosphorus Level


  


  


  3.3 MG/DL


(2.5-4.9) 


 


 


Magnesium Level


  


  


  1.6 MG/DL


(1.8-2.4)  L 


 


 


Total Bilirubin


  


  


  0.5 MG/DL


(0.2-1.0) 


 


 


Aspartate Amino Transf


(AST/SGOT) 


  


  18 U/L (15-37)


  


 


 


Alanine Aminotransferase


(ALT/SGPT) 


  


  25 U/L (12-78)


  


 


 


Alkaline Phosphatase


  


  


  81 U/L


() 


 


 


Pro-B-Type Natriuretic Peptide


  


  


  80204 pg/mL


(0-125)  H 


 


 


Total Protein


  


  


  6.2 G/DL


(6.4-8.2)  L 


 


 


Albumin


  


  


  2.2 G/DL


(3.4-5.0)  L 


 


 


Globulin   4.0 g/dL   


 


Albumin/Globulin Ratio


  


  


  0.6 (1.0-2.7)


L 


 


 


Digoxin Level


  


  


  2.3 NG/ML


(0.5-2.0)  H 


 

















Ace Fraire MD Oct 5, 2019 13:44

## 2019-10-05 NOTE — NUR
CASE MANAGEMENT: REVIEW 



10/5/2019



SI:SEPSIS. ACUTE KIDNEY FAILURE. 

T 98.3 HR 82 RR 21 B/P 152/66 SATS 98% ON 3L/NC 

WBC 13.7 CO2 20 BUN 25 CR 2  CA 8.3 



IS:LASIX IV Q12H 

LOPRESSOR PO BID 

LIPITOR PO QHS 

ELIQUIS PO BID 

PLAVIX PO QD

PEPCID IV Q12H 

CEFEPIME IV Q24H 

FLAGYL IV Q8H 





**ICU**

## 2019-10-05 NOTE — NUR
NURSE NOTES:

received pt in bed, resting no c/o pain. vss . a/ox3. abdomen flat, non tender. bilateral 
pulses bounding. non pitting edeam noted on extremities. afebrile. condom cath on, no bm at 
this time. peripheral iv lt wrist and ac 20g. hep drip at 20 units /kg. call light in reach, 
bed alarm on.

## 2019-10-06 VITALS — DIASTOLIC BLOOD PRESSURE: 75 MMHG | SYSTOLIC BLOOD PRESSURE: 149 MMHG

## 2019-10-06 VITALS — SYSTOLIC BLOOD PRESSURE: 143 MMHG | DIASTOLIC BLOOD PRESSURE: 76 MMHG

## 2019-10-06 VITALS — SYSTOLIC BLOOD PRESSURE: 144 MMHG | DIASTOLIC BLOOD PRESSURE: 71 MMHG

## 2019-10-06 VITALS — SYSTOLIC BLOOD PRESSURE: 155 MMHG | DIASTOLIC BLOOD PRESSURE: 84 MMHG

## 2019-10-06 VITALS — SYSTOLIC BLOOD PRESSURE: 145 MMHG | DIASTOLIC BLOOD PRESSURE: 87 MMHG

## 2019-10-06 VITALS — DIASTOLIC BLOOD PRESSURE: 74 MMHG | SYSTOLIC BLOOD PRESSURE: 156 MMHG

## 2019-10-06 LAB
ADD MANUAL DIFF: NO
ANION GAP SERPL CALC-SCNC: 11 MMOL/L (ref 5–15)
APPEARANCE UR: CLEAR
APTT PPP: (no result) S
BASOPHILS NFR BLD AUTO: 0.5 % (ref 0–2)
BUN SERPL-MCNC: 23 MG/DL (ref 7–18)
CALCIUM SERPL-MCNC: 8.5 MG/DL (ref 8.5–10.1)
CHLORIDE SERPL-SCNC: 108 MMOL/L (ref 98–107)
CO2 SERPL-SCNC: 22 MMOL/L (ref 21–32)
CREAT SERPL-MCNC: 1.8 MG/DL (ref 0.55–1.3)
EOSINOPHIL NFR BLD AUTO: 0.4 % (ref 0–3)
ERYTHROCYTE [DISTWIDTH] IN BLOOD BY AUTOMATED COUNT: 12.5 % (ref 11.6–14.8)
GLUCOSE UR STRIP-MCNC: NEGATIVE MG/DL
HCT VFR BLD CALC: 33 % (ref 42–52)
HGB BLD-MCNC: 11 G/DL (ref 14.2–18)
KETONES UR QL STRIP: NEGATIVE
LEUKOCYTE ESTERASE UR QL STRIP: NEGATIVE
LYMPHOCYTES NFR BLD AUTO: 11.6 % (ref 20–45)
MCV RBC AUTO: 86 FL (ref 80–99)
MONOCYTES NFR BLD AUTO: 9.2 % (ref 1–10)
NEUTROPHILS NFR BLD AUTO: 78.4 % (ref 45–75)
NITRITE UR QL STRIP: NEGATIVE
PH UR STRIP: 5 [PH] (ref 4.5–8)
PLATELET # BLD: 285 K/UL (ref 150–450)
POTASSIUM SERPL-SCNC: 3.2 MMOL/L (ref 3.5–5.1)
PROT UR QL STRIP: (no result)
RBC # BLD AUTO: 3.84 M/UL (ref 4.7–6.1)
SODIUM SERPL-SCNC: 141 MMOL/L (ref 136–145)
SP GR UR STRIP: 1.01 (ref 1–1.03)
UROBILINOGEN UR-MCNC: NORMAL MG/DL (ref 0–1)
WBC # BLD AUTO: 12.4 K/UL (ref 4.8–10.8)

## 2019-10-06 RX ADMIN — SODIUM CHLORIDE SCH MLS/HR: 0.9 INJECTION INTRAVENOUS at 08:19

## 2019-10-06 RX ADMIN — INSULIN DETEMIR SCH UNITS: 100 INJECTION, SOLUTION SUBCUTANEOUS at 08:22

## 2019-10-06 RX ADMIN — METOPROLOL TARTRATE SCH MG: 50 TABLET, FILM COATED ORAL at 08:18

## 2019-10-06 RX ADMIN — INSULIN DETEMIR SCH UNITS: 100 INJECTION, SOLUTION SUBCUTANEOUS at 21:26

## 2019-10-06 RX ADMIN — INSULIN ASPART SCH UNITS: 100 INJECTION, SOLUTION INTRAVENOUS; SUBCUTANEOUS at 12:11

## 2019-10-06 RX ADMIN — ASPIRIN 81 MG SCH MG: 81 TABLET ORAL at 08:17

## 2019-10-06 RX ADMIN — METOPROLOL TARTRATE SCH MG: 50 TABLET, FILM COATED ORAL at 17:14

## 2019-10-06 RX ADMIN — INSULIN ASPART SCH UNITS: 100 INJECTION, SOLUTION INTRAVENOUS; SUBCUTANEOUS at 06:19

## 2019-10-06 RX ADMIN — INSULIN ASPART SCH UNITS: 100 INJECTION, SOLUTION INTRAVENOUS; SUBCUTANEOUS at 17:17

## 2019-10-06 RX ADMIN — APIXABAN SCH MG: 2.5 TABLET, FILM COATED ORAL at 17:14

## 2019-10-06 RX ADMIN — INSULIN DETEMIR SCH UNITS: 100 INJECTION, SOLUTION SUBCUTANEOUS at 21:25

## 2019-10-06 RX ADMIN — ATORVASTATIN CALCIUM SCH MG: 20 TABLET, FILM COATED ORAL at 21:11

## 2019-10-06 RX ADMIN — APIXABAN SCH MG: 2.5 TABLET, FILM COATED ORAL at 08:18

## 2019-10-06 NOTE — NUR
NURSE NOTES:

call from DR. Escobar- explained to her pt. complaining of pain like pressure to abdominal 
area- per DR. Escobar to monitor pt. for now- no orders given.

## 2019-10-06 NOTE — PULMONOLOGY PROGRESS NOTE
Assessment/Plan


Problems:  


(1) Atrial fibrillation with rapid ventricular response


(2) Acute exacerbation of CHF (congestive heart failure)


(3) Acute kidney injury superimposed on CKD


(4) Gram positive bacterial infection


(5) DM (diabetes mellitus)


(6) HTN (hypertension)


(7) Hypoxemia


Assessment/Plan


Optimize pulmonary hygiene/mobilize as tolerated


Titrate down FiO2 to keep SaO2 > 90%


Continue Cefepime/Vanco/Flagyl (D3) per ID


F/U labs sent to CS: PCt and VRPM


F/U CARDS/EPS recs ---> PO MTP, Eliquis, diuresis


Monitor volumes and renal function, diuresis as able


Glycemic control


Aspiration precautions


DVT Px: Eliquis


FC


Check UA and bladder scan





Subjective


Allergies:  


Coded Allergies:  


     No Known Allergies (Unverified , 6/2/16)


Subjective


TTF AFVSS x elevated BP O2 needs stable + SP pain and bladder discomfort no 

cough + SOB no CP no FC





Objective





Last 24 Hour Vital Signs








  Date Time  Temp Pulse Resp B/P (MAP) Pulse Ox O2 Delivery O2 Flow Rate FiO2


 


10/6/19 08:18  96  156/74    


 


10/6/19 08:00  92      


 


10/6/19 08:00       2.0 


 


10/6/19 08:00      Nasal Cannula 3.0 


 


10/6/19 08:00 98.0 96 21 156/74 (101) 100   


 


10/6/19 04:00 98.1 108 22 155/84 (107) 98   


 


10/6/19 04:00  90      


 


10/6/19 04:00       3.0 


 


10/6/19 04:00      Nasal Cannula 3.0 


 


10/6/19 00:00  101      


 


10/6/19 00:00      Nasal Cannula 3.0 


 


10/6/19 00:00       3.0 


 


10/6/19 00:00 96.4 89 20 145/87 (106) 98   


 


10/5/19 20:00  92      


 


10/5/19 20:00 98.2 104 20 131/86 (101) 99   


 


10/5/19 20:00       3.0 


 


10/5/19 20:00      Nasal Cannula 3.0 


 


10/5/19 19:09     98 Nasal Cannula 3.0 32


 


10/5/19 19:09  90 20  98 Nasal Cannula 3.0 32


 


10/5/19 18:00  99      


 


10/5/19 18:00  82 21 142/52 (82) 98   


 


10/5/19 17:44  109  160/81    


 


10/5/19 17:00  82 21 125/52 (76) 98   


 


10/5/19 16:00      Nasal Cannula 3.0 


 


10/5/19 16:00 98.4 100 21 125/52 (76) 100   


 


10/5/19 15:00  99 23 148/67 (94) 100   


 


10/5/19 14:00  93 25 130/72 (91) 100   


 


10/5/19 13:00  93 23 141/61 (87) 99   


 


10/5/19 12:00  80      


 


10/5/19 12:00      Nasal Cannula 3.0 


 


10/5/19 12:00 98.8 86 23 130/64 (86) 100   


 


10/5/19 11:00  75 22 146/64 (91) 99   


 


10/5/19 10:00  84 19 124/57 (79) 99   


 


10/5/19 09:45  83  133/60    

















Intake and Output  


 


 10/5/19 10/6/19





 19:00 07:00


 


Intake Total 196.224 ml 200 ml


 


Output Total 250 ml 200 ml


 


Balance -53.776 ml 0 ml


 


  


 


Intake Oral 130 ml 


 


IV Total 66.224 ml 200 ml


 


Output Urine Total 250 ml 200 ml


 


# Voids 1 2


 


# Bowel Movements 5 1








General Appearance:  WD/WN, no acute distress


HEENT:  normocephalic, atraumatic, anicteric, other - elevated JVD


Respiratory/Chest:  chest wall non-tender, no respiratory distress, crackles/

rales


Cardiovascular:  normal peripheral pulses, normal rate, regular rhythm


Abdomen:  normal bowel sounds, no organomegaly, non distended, no mass, tender 

- over bladder, other - soft


Extremities:  no cyanosis, no clubbing, other - trace edema at ankles





Microbiology








 Date/Time


Source Procedure


Growth Status


 


 


 10/4/19 11:10


Blood Blood Culture - Preliminary


NO GROWTH AFTER 24 HOURS Resulted


 


 10/4/19 11:00


Blood Blood Culture - Preliminary


NO GROWTH AFTER 24 HOURS Resulted


 


 10/4/19 00:25


Blood Blood Culture - Preliminary


NO GROWTH AFTER 48 HOURS Resulted


 


 10/4/19 00:10


Blood Blood Culture - Preliminary


Gram Positive [place] Resulted


 


 10/4/19 04:15


Nasal Nares MRSA Culture - Final


NO METHICILLIN RESISTANT STAPH AUREUS... Complete


 


 10/4/19 04:15


Rectum - Final


NO CARBAPENEM-RESISTANT ENTEROBACTERI... Complete


 


 10/4/19 04:15


Rectum VRE Culture - Final


Enterococcus Faecium - Vre Complete








Laboratory Tests


10/5/19 14:20: 


White Blood Count 13.7H, Red Blood Count 3.85L, Hemoglobin 11.2L, Hematocrit 

33.8L, Mean Corpuscular Volume 88, Mean Corpuscular Hemoglobin 29.0, Mean 

Corpuscular Hemoglobin Concent 33.0, Red Cell Distribution Width 12.7, Platelet 

Count 284, Mean Platelet Volume 7.6, Neutrophils (%) (Auto) 81.0H, Lymphocytes (

%) (Auto) 8.8L, Monocytes (%) (Auto) 9.4, Eosinophils (%) (Auto) 0.3, Basophils 

(%) (Auto) 0.5, Sodium Level 138, Potassium Level 3.5, Chloride Level 107, 

Carbon Dioxide Level 20L, Anion Gap 11, Blood Urea Nitrogen 25H, Creatinine 2.0H

, Estimat Glomerular Filtration Rate , Glucose Level 260H, Calcium Level 8.3L, 

Total Bilirubin 0.5, Aspartate Amino Transf (AST/SGOT) 21, Alanine 

Aminotransferase (ALT/SGPT) 30, Alkaline Phosphatase 86, Total Protein 6.5, 

Albumin 2.3L, Globulin 4.2, Albumin/Globulin Ratio 0.5L


10/6/19 05:20: 


White Blood Count 12.4H, Red Blood Count 3.84L, Hemoglobin 11.0L, Hematocrit 

33.0L, Mean Corpuscular Volume 86, Mean Corpuscular Hemoglobin 28.7, Mean 

Corpuscular Hemoglobin Concent 33.3, Red Cell Distribution Width 12.5, Platelet 

Count 285, Mean Platelet Volume 7.3, Neutrophils (%) (Auto) 78.4H, Lymphocytes (

%) (Auto) 11.6L, Monocytes (%) (Auto) 9.2, Eosinophils (%) (Auto) 0.4, 

Basophils (%) (Auto) 0.5, Sodium Level 141, Potassium Level 3.2L, Chloride 

Level 108H, Carbon Dioxide Level 22, Anion Gap 11, Blood Urea Nitrogen 23H, 

Creatinine 1.8H, Estimat Glomerular Filtration Rate , Glucose Level 144#H, 

Calcium Level 8.5, Hemoglobin A1c 8.4H, Random Vancomycin Level 11.7





Current Medications








 Medications


  (Trade)  Dose


 Ordered  Sig/Sherly


 Route


 PRN Reason  Start Time


 Stop Time Status Last Admin


Dose Admin


 


 Apixaban


  (Eliquis)  2.5 mg  BID


 ORAL


   10/6/19 09:00


 11/4/19 17:59  10/6/19 08:18


 


 


 Aspirin


  (ASA)  81 mg  DAILY


 ORAL


   10/6/19 09:00


 11/3/19 11:14  10/6/19 08:17


 


 


 Atorvastatin


 Calcium


  (Lipitor)  20 mg  BEDTIME


 ORAL


   10/5/19 21:00


 11/3/19 20:59  10/5/19 20:21


 


 


 Cefepime HCl 2 gm/


 Dextrose  55 ml @ 


 110 mls/hr  Q24H


 IVPB


   10/6/19 09:00


 10/12/19 08:59  10/6/19 08:19


 


 


 Clopidogrel


 Bisulfate


  (Plavix)  75 mg  DAILY


 ORAL


   10/6/19 09:00


 11/3/19 11:14  10/6/19 08:18


 


 


 Dextrose


  (Dextrose 50%)  25 ml  Q30M  PRN


 IV


 Hypoglycemia  10/5/19 19:15


 11/3/19 11:14   


 


 


 Dextrose


  (Dextrose 50%)  50 ml  Q30M  PRN


 IV


 Hypoglycemia  10/5/19 19:15


 11/3/19 11:14   


 


 


 Famotidine


  (Pepcid I.v.)  20 mg  Q12HR


 IVP


   10/5/19 21:00


 11/3/19 20:59  10/6/19 08:17


 


 


 Furosemide


  (Lasix)  40 mg  EVERY 12  HOURS


 IV


   10/5/19 21:00


 11/3/19 08:59  10/6/19 08:17


 


 


 Insulin Aspart


  (NovoLOG)    Q6HR


 SUBQ


   10/6/19 00:00


 11/3/19 11:59  10/6/19 06:19


 


 


 Insulin Detemir


  (Levemir)  15 units  BEDTIME


 SUBQ


   10/5/19 21:00


 11/3/19 20:59  10/5/19 21:30


 


 


 Insulin Detemir


  (Levemir)  25 units  DAILY


 SUBQ


   10/6/19 09:00


 11/3/19 11:14  10/6/19 08:22


 


 


 Metoprolol


 Tartrate


  (Lopressor)  50 mg  TWICE A  DAY


 ORAL


   10/6/19 09:00


 11/3/19 17:59  10/6/19 08:18


 


 


 Metronidazole  100 ml @ 


 100 mls/hr  Q8HR


 IVPB


   10/5/19 22:00


 10/11/19 19:59  10/6/19 05:13


 


 


 Vancomycin HCl


  (Vanco rx to


 dose)  1 ea  DAILY  PRN


 MISC


 Per rx protocol  10/6/19 09:00


 11/3/19 07:59   


 


 


 Vancomycin HCl


 1.25 gm/Dextrose  275 ml @ 


 183.33 mls/


 hr  ONCE


 IVPB


   10/6/19 09:00


 10/6/19 10:00  10/6/19 09:23


 

















Ace Fraire MD Oct 6, 2019 09:42

## 2019-10-06 NOTE — NUR
HAND-OFF: 

Report given to Carlitos Hensley, pt. remians stable and no signs of distress noted- pt. denies 
pain.

-------------------------------------------------------------------------------

Addendum: 10/06/19 at 0709 by KAMALJIT BROWN RN RN

-------------------------------------------------------------------------------

Nurse aware to f/u on abnormal am labs- potassium trending down.

## 2019-10-06 NOTE — NUR
NURSE NOTES:

re-assessed pt. for pain- pt. appears to be resting comfortably and distress noted- will 
continue to monitor pt. for pain and with plan of care.

## 2019-10-06 NOTE — NUR
NURSE NOTES:

Called and informed Dr. Schaffer that per post void residual taken at 1600 revealed 906 
ml on bladder scan, pt c/o pain upon urination, and noted hematuria. Dr. Schaffer 
acknowledged and ordered insert benjamin catheter for acute urinary retention, flush benjamin with 
saline Q4HR until clear for hematuria, and Tamsulosin 0.4 mg PO QHS. Orders entered, noted, 
and carried out. Will continue to monitor patient.

-------------------------------------------------------------------------------

Addendum: 10/06/19 at 1837 by RADHA MAIER RN

-------------------------------------------------------------------------------

NURSE NOTES:

Called and informed Dr. Schaffer that per post void residual taken at 1600 revealed 906 
ml on bladder scan, pt c/o pain upon urination, and noted hematuria. Dr. Schaffer 
acknowledged and ordered insert benjamin catheter for acute urinary retention, flush benjamin with 
saline Q4HR PRN until clear for hematuria, and Tamsulosin 0.4 mg PO QHS. Orders entered, 
noted, and carried out. Will continue to monitor patient.

## 2019-10-06 NOTE — NUR
CASE MANAGEMENT: REVIEW 



10/6/2019



SI:SEPSIS. ACUTE KIDNEY FAILURE. 

T 98.1  RR 22 B/P 155/84 SATS 98% ON 3L/NC 

WBC 12.4 K 3.2  BUN 23 CR 1.8  



IS:LASIX IV Q12H 

LOPRESSOR PO BID 

LIPITOR PO QHS 

ELIQUIS PO BID 

PLAVIX PO QD

PEPCID IV Q12H 

CEFEPIME IV Q24H 

FLAGYL IV Q8H 



**SDU**

## 2019-10-06 NOTE — NUR
NURSE NOTES:

Patient c/o 10/10 pain, patient pointing at bladder. Performed bladder scan, 611ml noted on 
scan. Called and informed Dr. Schaffer of situation. Dr. Schaffer ordered straight 
catheter insertion, send urine sample for culture, and post void residual 6 hours later. 
Also informed MD that patient has no PRN pain medication, MD acknowledged and ordered 
Tylenol 500 mg PO Q6HR PRN Mild to moderate pain, Tramadol 50 mg PO Q6HR PRN for severe 
pain. Also informed Dr. Schaffer that patient's potassium level today is 3.2, MD 
ordered K-dur 40 mEq PO x 1, per MD will order labs himself for tomorrow. Noted. Will 
continue to monitor patient.

## 2019-10-06 NOTE — NUR
NURSE NOTES:

Received report from ANALIA Corado RN. Pt is in stable, alert, oriented to name and place, 
Yi speaking. Pt denies pain, no signs or symptoms of distress noted at this time. Left 
antecubital 20 gauge PIV and left wrist 20 gauge PIV saline lock, flushed with NS, patent, 
no issues. Pt is currently on room air sating at 97%. Pt is currently sinus rhythm with 
heart rate of 91 bpm. Pt with benjamin catheter draining clear yellow urine. Bed in low and 
locked position, bed alarm is engaged, call light within reach. Pt educated to use call 
light when in need of assistance, verbalized understanding. Will continue to monitor 
closely.

## 2019-10-06 NOTE — DIAGNOSTIC IMAGING REPORT
EXAM:

  XR Chest, 1 View

 

CLINICAL HISTORY:

  SOB

 

TECHNIQUE:

  Frontal view of the chest.

 

COMPARISON:

  Chest radiograph on 10 4 2019

 

FINDINGS:

  Hardware: None.

 

  Lungs pleura: Decreased pulmonary edema.  Persistent bilateral pleural 

effusions with associated atelectasis versus pneumonia.  Probable mild 

pulmonary vasculature congestion.

 

  Heart mediastinum: Stable mild enlargement of the cardiac silhouette.

 

  Soft tissues: Unremarkable.

 

  Bones: No acute fracture.  Degenerative changes of the visualized right 

acromioclavicular joint.  Degenerative changes of the spine.

 

  Upper abdomen: Normal.

 

IMPRESSION:   

  Decreased pulmonary edema.  Persistent bilateral pleural effusions with 

associated atelectasis versus pneumonia.  Probable mild pulmonary 

vasculature congestion.

## 2019-10-06 NOTE — CARDIOLOGY PROGRESS NOTE
Assessment/Plan


Status:  stable


Assessment/Plan


Assessment/Plan





Acute kidney injury.


Anemia.


Diabetes.


Hypertension.


History of peripheral vascular disease.Left foot second toe amputation 

secondary to gangrene and osteo.


hyperlipidemia.


systolic CHF.


CAD sp PCI





Recommendations


TTE reviewed, LVEF 30-35%


Continue with diuresis - transition to PO


CHAO/Cardioversion if unstable


Afterload reduction as BP tolerates


D/c cardizem given low EF


Continue rate control with metoprolol


Caution with digoxin and renal function


Continue DAPT


Continue statin


Continue metoprolol


Continue eliquis renally dosed


Dr. Malave to return Monday














-Critical care 35 minutes





Subjective


Cardiovascular:  Reports: no symptoms


Respiratory:  Reports: no symptoms


Gastrointestinal/Abdominal:  Reports: no symptoms


Genitourinary:  Reports: no symptoms


Subjective


COVERAGE FOR SHA:





Troponin down trending vitals stable


Echo LVEF 30-35 percent, grade 2 diastolic dysfunction, mild pulmonary 

hypertension





Transferred to telemetry, no chest pain, heparin stopped, eliquis started





Objective





Last 24 Hour Vital Signs








  Date Time  Temp Pulse Resp B/P (MAP) Pulse Ox O2 Delivery O2 Flow Rate FiO2


 


10/6/19 16:00       2.0 


 


10/6/19 16:00      Nasal Cannula 3.0 


 


10/6/19 12:00 98.3 96 21 149/75 (99) 100   


 


10/6/19 12:00  97      


 


10/6/19 12:00       2.0 


 


10/6/19 12:00      Nasal Cannula 3.0 


 


10/6/19 08:18  96  156/74    


 


10/6/19 08:00  92      


 


10/6/19 08:00       2.0 


 


10/6/19 08:00      Nasal Cannula 3.0 


 


10/6/19 08:00 98.0 96 21 156/74 (101) 100   


 


10/6/19 04:00 98.1 108 22 155/84 (107) 98   


 


10/6/19 04:00  90      


 


10/6/19 04:00       3.0 


 


10/6/19 04:00      Nasal Cannula 3.0 


 


10/6/19 00:00  101      


 


10/6/19 00:00      Nasal Cannula 3.0 


 


10/6/19 00:00       3.0 


 


10/6/19 00:00 96.4 89 20 145/87 (106) 98   


 


10/5/19 20:00  92      


 


10/5/19 20:00 98.2 104 20 131/86 (101) 99   


 


10/5/19 20:00       3.0 


 


10/5/19 20:00      Nasal Cannula 3.0 


 


10/5/19 19:09     98 Nasal Cannula 3.0 32


 


10/5/19 19:09  90 20  98 Nasal Cannula 3.0 32


 


10/5/19 18:00  99      


 


10/5/19 18:00  82 21 142/52 (82) 98   


 


10/5/19 17:44  109  160/81    








General Appearance:  no apparent distress, alert


EENT:  PERRL/EOMI, normal ENT inspection, TMs normal, pharynx normal


Neck:  non-tender, normal alignment, supple, normal inspection, no JVD


Rhythm:  Afib


Cardiovascular:  normal peripheral pulses, regularly irregular


Respiratory/Chest:  chest wall non-tender, lungs clear, normal breath sounds, 

no respiratory distress, no accessory muscle use


Abdomen:  normal bowel sounds, non tender, soft


Extremities:  normal range of motion, non-tender, normal inspection, no calf 

tenderness, no swelling


Neurologic:  CNs II-XII grossly normal, no motor/sensory deficits











Intake and Output  


 


 10/5/19 10/6/19





 19:00 07:00


 


Intake Total 196.224 ml 200 ml


 


Output Total 250 ml 200 ml


 


Balance -53.776 ml 0 ml


 


  


 


Intake Oral 130 ml 


 


IV Total 66.224 ml 200 ml


 


Output Urine Total 250 ml 200 ml


 


# Voids 1 2


 


# Bowel Movements 5 1











Laboratory Tests








Test


  10/6/19


05:20 10/6/19


09:50


 


White Blood Count


  12.4 K/UL


(4.8-10.8)  H 


 


 


Red Blood Count


  3.84 M/UL


(4.70-6.10)  L 


 


 


Hemoglobin


  11.0 G/DL


(14.2-18.0)  L 


 


 


Hematocrit


  33.0 %


(42.0-52.0)  L 


 


 


Mean Corpuscular Volume 86 FL (80-99)   


 


Mean Corpuscular Hemoglobin


  28.7 PG


(27.0-31.0) 


 


 


Mean Corpuscular Hemoglobin


Concent 33.3 G/DL


(32.0-36.0) 


 


 


Red Cell Distribution Width


  12.5 %


(11.6-14.8) 


 


 


Platelet Count


  285 K/UL


(150-450) 


 


 


Mean Platelet Volume


  7.3 FL


(6.5-10.1) 


 


 


Neutrophils (%) (Auto)


  78.4 %


(45.0-75.0)  H 


 


 


Lymphocytes (%) (Auto)


  11.6 %


(20.0-45.0)  L 


 


 


Monocytes (%) (Auto)


  9.2 %


(1.0-10.0) 


 


 


Eosinophils (%) (Auto)


  0.4 %


(0.0-3.0) 


 


 


Basophils (%) (Auto)


  0.5 %


(0.0-2.0) 


 


 


Sodium Level


  141 MMOL/L


(136-145) 


 


 


Potassium Level


  3.2 MMOL/L


(3.5-5.1)  L 


 


 


Chloride Level


  108 MMOL/L


()  H 


 


 


Carbon Dioxide Level


  22 MMOL/L


(21-32) 


 


 


Anion Gap


  11 mmol/L


(5-15) 


 


 


Blood Urea Nitrogen


  23 mg/dL


(7-18)  H 


 


 


Creatinine


  1.8 MG/DL


(0.55-1.30)  H 


 


 


Estimat Glomerular Filtration


Rate  mL/min (>60)  


  


 


 


Glucose Level


  144 MG/DL


()  #H 


 


 


Hemoglobin A1c


  8.4 %


(4.3-6.0)  H 


 


 


Calcium Level


  8.5 MG/DL


(8.5-10.1) 


 


 


Random Vancomycin Level 11.7 ug/mL   


 


Urine Color  Pale yellow  


 


Urine Appearance  Clear  


 


Urine pH  5 (4.5-8.0)  


 


Urine Specific Gravity


  


  1.010


(1.005-1.035)


 


Urine Protein


  


  1+ (NEGATIVE)


H


 


Urine Glucose (UA)


  


  Negative


(NEGATIVE)


 


Urine Ketones


  


  Negative


(NEGATIVE)


 


Urine Blood


  


  3+ (NEGATIVE)


H


 


Urine Nitrite


  


  Negative


(NEGATIVE)


 


Urine Bilirubin


  


  Negative


(NEGATIVE)


 


Urine Urobilinogen


  


  Normal MG/DL


(0.0-1.0)


 


Urine Leukocyte Esterase


  


  Negative


(NEGATIVE)


 


Urine RBC


  


  20-30 /HPF (0


- 0)  H


 


Urine WBC


  


  0 /HPF (0 - 0)


 


 


Urine Squamous Epithelial


Cells 


  None /LPF


(NONE/OCC)


 


Urine Bacteria


  


  Occasional


/HPF (NONE)











Microbiology








 Date/Time


Source Procedure


Growth Status


 


 


 10/4/19 11:10


Blood Blood Culture - Preliminary


NO GROWTH AFTER 24 HOURS Resulted


 


 10/4/19 11:00


Blood Blood Culture - Preliminary


NO GROWTH AFTER 24 HOURS Resulted


 


 10/4/19 00:25


Blood Blood Culture - Preliminary


NO GROWTH AFTER 48 HOURS Resulted


 


 10/4/19 00:10


Blood Blood Culture - Preliminary


Gram Positive Le[place] Resulted


 


 10/4/19 04:15


Nasal Nares MRSA Culture - Final


NO METHICILLIN RESISTANT STAPH AUREUS... Complete


 


 10/4/19 04:15


Rectum - Final


NO CARBAPENEM-RESISTANT ENTEROBACTERI... Complete


 


 10/4/19 04:15


Rectum VRE Culture - Final


Enterococcus Faecium - Vre Complete

















Reji Wright MD Oct 6, 2019 17:12

## 2019-10-06 NOTE — NUR
NURSE NOTES:

Received report from BRANDON Acevedo. Patient is resting in bed, in stable condition. No s/sx 
of SOB, breathing is even and unlabored, on 3L NC as ordered. Denies any presence of pain or 
discomfort at this time. Bed is in lowest position, brakes engaged. Call light is kept 
within easy reach. Will continue to monitor patient.

## 2019-10-06 NOTE — GENERAL PROGRESS NOTE
Assessment/Plan


Problem List:  


(1) HTN (hypertension)


ICD Codes:  I10 - Essential (primary) hypertension


SNOMED:  87472238


(2) DM (diabetes mellitus)


ICD Codes:  E11.9 - Type 2 diabetes mellitus without complications


SNOMED:  49950393


(3) Acute exacerbation of CHF (congestive heart failure)


ICD Codes:  I50.9 - Heart failure, unspecified


SNOMED:  143928960, [id number]


Qualifiers:  


   Qualified Codes:  I50.9 - Heart failure, unspecified


(4) Atrial fibrillation with rapid ventricular response


ICD Codes:  I48.91 - Unspecified atrial fibrillation


SNOMED:  412490061422896


(5) Hypoxemia


ICD Codes:  R09.02 - Hypoxemia


SNOMED:  955244829


(6) MERLYN (acute kidney injury)


ICD Codes:  N17.9 - Acute kidney failure, unspecified


SNOMED:  18499256, 0770772


(7) Cellulitis in diabetic foot


ICD Codes:  E11.628 - Type 2 diabetes mellitus with other skin complications; 

L03.119 - Cellulitis of unspecified part of limb


SNOMED:  187319965, 94128279


(8) Sepsis


ICD Codes:  A41.9 - Sepsis, unspecified organism


SNOMED:  24749641, 70737952


Qualifiers:  


   Qualified Codes:  A41.9 - Sepsis, unspecified organism; R65.20 - Severe 

sepsis without septic shock; J96.01 - Acute respiratory failure with hypoxia


Status:  stable


Assessment/Plan:


This is a 74 year old male with a PMHx Dm2, HTN, CHF, PAD s/p left iliac stent 

placement 10/25/19 and subsequent left 2nd toe amputation for gangrene/

osteomyelitis, CAD who presents with acute hypoxic respiratory failure 2/2 CHF 

exacerbation and sepsis. 





#acute hypoxic respiratory failure 2/2 systolic CHF exacerbation (new diagnosis)

- no known history of CHF. Also treating for possible pneumonia - IMPROVING


- telemetry


- titrate SpO2 to keep O2 >92%


- f/u blood cultures. 1/4 sets gram  + bailey. Likely contaminant per ID


- f/u sputum culture


- Vancomycin, Cefepime and Flagyl


- lasix 40mg IV BID


- may change to PO lasix tomorrow


- may start afterload reduction with hydralazine 25mg PO TID and isordil 10mg 

TID per cardiology


- ACE-I contraindicated in setting of acute renal failure


- appreciate Cardiology recommendations: Dr. Wright


- Appreciate Infectious Disease recommendations: Dr. Bergman





#Atrial fibrillation with RVR, new diagnosis. - resolved 


CHADS-VASC 5 - risk of stroke 7.2% per year


HASBLED - 3 - risk of bleeding 5.8% per year


> Explained to patient risks and benefits of anticoagulation with Asa/Plavix 

and an anticoagulant. Patient understands the risks of stroke are > than risk 

of bleeding and would like to take the anticoagulant. Will start patient on 

eliquis renal dosing 2.5mg PO BID


- s/p Diltiazem Gtt


- Metoprolol and digoxin for heart rate controlled


- appreicate cardiology recommendations





#sepsis (WBC, heart rate, respiratory rate) 2/2 pneumonia - IMPROVING


#gram + bailye in blood culture- likely contaminant per ID


> C diff negative


> ESR 74, CRP 1.4, unlikely osteomyelitis


- cultures as above


- Repeat blood cultures


- antibiotics as above


- patient fluid overloaded, holding off extra fluids for now





#acute urinary retention, new problem


- straight cath x 1


- send for UA and urine culture


- check PVR Q6hr PRN, straight cath if > 300cc


- may need tamsulosin





#hypokalemia


- replete


- trend BMP





 #lactic acidosis suspected 2/2 CHF exacerbation vs. sepsis - RESOLVED


- trended down to 1


- management as above





#Acute on Chronic Renal failure 2/2 CHF exacerbation - Continue to improve


> Baseline Cr 1.5-1.7


- IV diuresis as above


- continue to trend 


- avoid nephrotoxins


- replace lytes





#S/p left 2nd toe amputation due to gangrene/osteomyelitis


#s/p recent left iliac stent on ASA/Plavix 10 days ago.


> ESR 73, CRP 1.4


- wound consult


- request records from [facility]





#Hypertension


#hyperlipidemia


#CAD


-hold home amlodipine


- continue metoprolol as above


- ASA/Plavix as above





#Diabetes mellitus type 1 per patient (suspect this is an error and has type 2) 

with hyperglycemia-  improved


> Hemoglobin A1C 8.6 on 10/6/10


- accuchecks Q6hr


- Diabetic diet


- Levemir 25 units QAM and 15 units QPM (home is 25 and 30)


- SSI, average





FENPPx


DVT PPx: eliquis


GI PPX: pepcid


Fluids: none


Diet: Diabetic diet


Lines: none


PT/OT: pending





Dispo: SNF vs. Home





D/w RN, patient, patient's family, pulmonology, cardiology, and infectious 

disease. I spent 37 minutes on this encounter. >50% spent on counseling and 

care coordination. 





Time of note may not reflect time patient was seen





Subjective


Date patient seen:  Oct 6, 2019


Constitutional:  Denies: chills, diaphoresis, fever, malaise, weakness, other


HEENT:  Denies: eye pain, blurred vision, tearing, double vision, ear pain, ear 

discharge, nose pain, nose congestion, throat pain, throat swelling, mouth pain

, mouth swelling, other


Cardiovascular:  Denies: chest pain, edema, irregular heart rate, 

lightheadedness, palpitations, syncope, other


Respiratory:  Denies: cough, orthopnea, shortness of breath, SOB with excertion

, SOB at rest, sputum, stridor, wheezing, other


Gastrointestinal/Abdominal:  Denies: abdomen distended, abdominal pain, black 

stools, tarry stools, blood in stool, constipated, diarrhea, difficulty 

swallowing, nausea, poor appetite, poor fluid intake, rectal bleeding, vomiting

, other


Genitourinary:  Denies: burning, discharge, frequency, flank pain, hematuria, 

incontinence, pain, urgency, other


Neurologic/Psychiatric:  Denies: anxiety, depressed, emotional problems, 

headache, numbness, paresthesia, pre-existing deficit, seizure, tingling, 

tremors, weakness, other


Endocrine:  Denies: excessive sweating, flushing, intolerance to cold, 

intolerance to heat, increased hunger, increased thirst, increased urine, 

unexplained weight gain, unexplained weight loss, other


Hematologic/Lymphatic:  Denies: anemia, easy bleeding, easy bruising, other


Allergies:  


Coded Allergies:  


     No Known Allergies (Unverified , 6/2/16)


Subjective


No acute events overnight per nursing. Titrated down to 2L today. SOB improved. 

Denies cough, cold, fever, chills. No chest pain. 


Urinary retention: x1, bladder scan 600cc, straight cath prodcued 600 cc. No 

history of BPH. new issue. Patient with some suprapubic discomfort. No fevers, 

chills, nausea, vomiting or back pain. 


Tachycardia: x 2 hours, up to 120-130s. BP stable. Asymptomatic. While patient 

was in pain with urinary retention. Now resolved. 


Denies chest pain, palpitations, fever, chills.





Objective





Last 24 Hour Vital Signs








  Date Time  Temp Pulse Resp B/P (MAP) Pulse Ox O2 Delivery O2 Flow Rate FiO2


 


10/6/19 08:18  96  156/74    


 


10/6/19 08:00  92      


 


10/6/19 08:00       2.0 


 


10/6/19 08:00      Nasal Cannula 3.0 


 


10/6/19 08:00 98.0 96 21 156/74 (101) 100   


 


10/6/19 04:00 98.1 108 22 155/84 (107) 98   


 


10/6/19 04:00  90      


 


10/6/19 04:00       3.0 


 


10/6/19 04:00      Nasal Cannula 3.0 


 


10/6/19 00:00  101      


 


10/6/19 00:00      Nasal Cannula 3.0 


 


10/6/19 00:00       3.0 


 


10/6/19 00:00 96.4 89 20 145/87 (106) 98   


 


10/5/19 20:00  92      


 


10/5/19 20:00 98.2 104 20 131/86 (101) 99   


 


10/5/19 20:00       3.0 


 


10/5/19 20:00      Nasal Cannula 3.0 


 


10/5/19 19:09     98 Nasal Cannula 3.0 32


 


10/5/19 19:09  90 20  98 Nasal Cannula 3.0 32


 


10/5/19 18:00  99      


 


10/5/19 18:00  82 21 142/52 (82) 98   


 


10/5/19 17:44  109  160/81    


 


10/5/19 17:00  82 21 125/52 (76) 98   


 


10/5/19 16:00      Nasal Cannula 3.0 


 


10/5/19 16:00 98.4 100 21 125/52 (76) 100   


 


10/5/19 15:00  99 23 148/67 (94) 100   


 


10/5/19 14:00  93 25 130/72 (91) 100   


 


10/5/19 13:00  93 23 141/61 (87) 99   


 


10/5/19 12:00  80      


 


10/5/19 12:00      Nasal Cannula 3.0 


 


10/5/19 12:00 98.8 86 23 130/64 (86) 100   

















Intake and Output  


 


 10/5/19 10/6/19





 19:00 07:00


 


Intake Total 196.224 ml 200 ml


 


Output Total 250 ml 200 ml


 


Balance -53.776 ml 0 ml


 


  


 


Intake Oral 130 ml 


 


IV Total 66.224 ml 200 ml


 


Output Urine Total 250 ml 200 ml


 


# Voids 1 2


 


# Bowel Movements 5 1








Laboratory Tests


10/5/19 14:20: 


White Blood Count 13.7H, Red Blood Count 3.85L, Hemoglobin 11.2L, Hematocrit 

33.8L, Mean Corpuscular Volume 88, Mean Corpuscular Hemoglobin 29.0, Mean 

Corpuscular Hemoglobin Concent 33.0, Red Cell Distribution Width 12.7, Platelet 

Count 284, Mean Platelet Volume 7.6, Neutrophils (%) (Auto) 81.0H, Lymphocytes (

%) (Auto) 8.8L, Monocytes (%) (Auto) 9.4, Eosinophils (%) (Auto) 0.3, Basophils 

(%) (Auto) 0.5, Sodium Level 138, Potassium Level 3.5, Chloride Level 107, 

Carbon Dioxide Level 20L, Anion Gap 11, Blood Urea Nitrogen 25H, Creatinine 2.0H

, Estimat Glomerular Filtration Rate , Glucose Level 260H, Calcium Level 8.3L, 

Total Bilirubin 0.5, Aspartate Amino Transf (AST/SGOT) 21, Alanine 

Aminotransferase (ALT/SGPT) 30, Alkaline Phosphatase 86, Total Protein 6.5, 

Albumin 2.3L, Globulin 4.2, Albumin/Globulin Ratio 0.5L


10/6/19 05:20: 


White Blood Count 12.4H, Red Blood Count 3.84L, Hemoglobin 11.0L, Hematocrit 

33.0L, Mean Corpuscular Volume 86, Mean Corpuscular Hemoglobin 28.7, Mean 

Corpuscular Hemoglobin Concent 33.3, Red Cell Distribution Width 12.5, Platelet 

Count 285, Mean Platelet Volume 7.3, Neutrophils (%) (Auto) 78.4H, Lymphocytes (

%) (Auto) 11.6L, Monocytes (%) (Auto) 9.2, Eosinophils (%) (Auto) 0.4, 

Basophils (%) (Auto) 0.5, Sodium Level 141, Potassium Level 3.2L, Chloride 

Level 108H, Carbon Dioxide Level 22, Anion Gap 11, Blood Urea Nitrogen 23H, 

Creatinine 1.8H, Estimat Glomerular Filtration Rate , Glucose Level 144#H, 

Calcium Level 8.5, Hemoglobin A1c 8.4H, Random Vancomycin Level 11.7


10/6/19 09:50: 


Urine Color Pale yellow, Urine Appearance Clear, Urine pH 5, Urine Specific 

Gravity 1.010, Urine Protein 1+H, Urine Glucose (UA) Negative, Urine Ketones 

Negative, Urine Blood 3+H, Urine Nitrite Negative, Urine Bilirubin Negative, 

Urine Urobilinogen Normal, Urine Leukocyte Esterase Negative, Urine RBC 20-30H, 

Urine WBC 0, Urine Squamous Epithelial Cells None, Urine Bacteria Occasional


Height (Feet):  5


Height (Inches):  6.00


Weight (Pounds):  181


General Appearance:  WD/WN, no apparent distress, alert


EENT:  PERRL/EOMI, normal ENT inspection


Neck:  non-tender, normal alignment, supple


Cardiovascular:  normal peripheral pulses, normal rate, JVD, other - irregular 

irregular, no murmurs, rubs or gallops


Respiratory/Chest:  chest wall non-tender - bibasilar crackles


Abdomen:  normal bowel sounds, non tender, soft


Extremities:  normal range of motion, non-tender


Edema:  other - 2+ pitting edema bilaterally, much improved.


Neurologic:  CNs II-XII grossly normal, no motor/sensory deficits, alert, 

oriented x 3, responsive


Skin:  normal pigmentation, warm/dry











Reynaldo Schaffer D.O. Oct 6, 2019 11:37

## 2019-10-06 NOTE — NUR
CASE MANAGEMENT: REVIEW 



10//2019



SI:SEPSIS. ACUTE KIDNEY FAILURE. 

T 98.3 HR 82 RR 21 B/P 152/66 SATS 98% ON 3L/NC 

WBC 13.7 CO2 20 BUN 25 CR 2  CA 8.3 



IS:LASIX IV Q12H 

LOPRESSOR PO BID 

LIPITOR PO QHS 

ELIQUIS PO BID 

PLAVIX PO QD

PEPCID IV Q12H 

CEFEPIME IV Q24H 

FLAGYL IV Q8H 





**ICU** 

-------------------------------------------------------------------------------

Addendum: 10/06/19 at 0722 by Vianney Saravia CM

-------------------------------------------------------------------------------

ENTERED IN ERROR

## 2019-10-06 NOTE — INFECTIOUS DISEASES PROG NOTE
Assessment/Plan


Assessment/Plan








ASSESSMENT AND PLAN:





1. sepsis, ? pna, chf, leukocytosis,k elevated lactic acid





   - vancomycin, cefepime and flagyl - day # 3 abx 


   - f/u chest x-ray better 


   - monitor labs


   - clinically improved, leukocytosis 





2. Acute kidney injury.


3. Anemia.


4. Diabetes.


5. Hypertension.


6. Blood sugar and blood pressure treatment for diabetes and


hypertension per primary.


7. History of peripheral vascular disease.


8. 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


9. Possible hyperlipidemia.


10. CHF.


11. PAD.


12. History of stent.


13. CAD.


14. Hypoxia.


15. Continue treatment per primary consultants.


16. No known allergies.


17. Social history is positive for smoking in the past, but quit.


18. Family history is positive for diabetes.


19. MAR was noted.


20. Case discussed with RN.


21. Skin care protocol.


22. ICU care.





Subjective


Constitutional:  Denies: fever


HEENT:  Denies: congestion


Respiratory:  Denies: shortness of breath


Cardiovascular:  Denies: chest pain


Gastrointestinal/Abdominal:  Denies: nausea, vomiting, diarrhea


Genitourinary:  Reports: other - + benjamin


Neurologic:  Denies: headache


Psychiatric:  Denies: depression


Skin:  Denies: rash


Hematologic:  Denies: bleeding


Musculoskeletal:  Denies: pain


Allergies:  


Coded Allergies:  


     No Known Allergies (Unverified , 6/2/16)





Objective


Vital Signs





Last 24 Hour Vital Signs








  Date Time  Temp Pulse Resp B/P (MAP) Pulse Ox O2 Delivery O2 Flow Rate FiO2


 


10/6/19 12:00 98.3 96 21 149/75 (99) 100   


 


10/6/19 12:00  97      


 


10/6/19 12:00       2.0 


 


10/6/19 12:00      Nasal Cannula 3.0 


 


10/6/19 08:18  96  156/74    


 


10/6/19 08:00  92      


 


10/6/19 08:00       2.0 


 


10/6/19 08:00      Nasal Cannula 3.0 


 


10/6/19 08:00 98.0 96 21 156/74 (101) 100   


 


10/6/19 04:00 98.1 108 22 155/84 (107) 98   


 


10/6/19 04:00  90      


 


10/6/19 04:00       3.0 


 


10/6/19 04:00      Nasal Cannula 3.0 


 


10/6/19 00:00  101      


 


10/6/19 00:00      Nasal Cannula 3.0 


 


10/6/19 00:00       3.0 


 


10/6/19 00:00 96.4 89 20 145/87 (106) 98   


 


10/5/19 20:00  92      


 


10/5/19 20:00 98.2 104 20 131/86 (101) 99   


 


10/5/19 20:00       3.0 


 


10/5/19 20:00      Nasal Cannula 3.0 


 


10/5/19 19:09     98 Nasal Cannula 3.0 32


 


10/5/19 19:09  90 20  98 Nasal Cannula 3.0 32


 


10/5/19 18:00  99      


 


10/5/19 18:00  82 21 142/52 (82) 98   


 


10/5/19 17:44  109  160/81    


 


10/5/19 17:00  82 21 125/52 (76) 98   


 


10/5/19 16:00      Nasal Cannula 3.0 


 


10/5/19 16:00 98.4 100 21 125/52 (76) 100   


 


10/5/19 15:00  99 23 148/67 (94) 100   








Height (Feet):  5


Height (Inches):  6.00


Weight (Pounds):  181


General Appearance:  no acute distress


HEENT:  normocephalic, atraumatic, anicteric, mucous membranes moist


Respiratory/Chest:  crackles/rales, rhonchi - bilaterally


Cardiovascular:  normal rate, regular rhythm, no gallop/murmur, no JVD


Abdomen:  normal bowel sounds, soft, non tender, no organomegaly, non distended


Genitourinary:  other - + benjamin - urine clearer


Extremities:  no cyanosis


Skin:  no rash


Neurologic/Psychiatric:  CNs II-XII grossly normal, alert, oriented x 3, 

responsive


Lymphatic:  no neck adenopathy


Musculoskeletal:  no effusion


Objective








Chest x-ray - 10/6 - 





Upper abdomen: Normal.


 


IMPRESSION:   


  Decreased pulmonary edema.  Persistent bilateral pleural effusions with 


associated atelectasis versus pneumonia.  Probable mild pulmonary 


vasculature congestion.


 











Microbiology








 Date/Time


Source Procedure


Growth Status


 


 


 10/4/19 11:10


Blood Blood Culture - Preliminary


NO GROWTH AFTER 24 HOURS Resulted


 


 10/4/19 11:00


Blood Blood Culture - Preliminary


NO GROWTH AFTER 24 HOURS Resulted


 


 10/4/19 00:25


Blood Blood Culture - Preliminary


NO GROWTH AFTER 48 HOURS Resulted


 


 10/4/19 00:10


Blood Blood Culture - Preliminary


Gram Positive [place] Resulted


 


 10/4/19 04:15


Nasal Nares MRSA Culture - Final


NO METHICILLIN RESISTANT STAPH AUREUS... Complete


 


 10/4/19 04:15


Rectum - Final


NO CARBAPENEM-RESISTANT ENTEROBACTERI... Complete


 


 10/4/19 04:15


Rectum VRE Culture - Final


Enterococcus Faecium - Vre Complete











Laboratory Tests








Test


  10/5/19


14:20 10/6/19


05:20 10/6/19


09:50


 


White Blood Count


  13.7 K/UL


(4.8-10.8)  H 12.4 K/UL


(4.8-10.8)  H 


 


 


Red Blood Count


  3.85 M/UL


(4.70-6.10)  L 3.84 M/UL


(4.70-6.10)  L 


 


 


Hemoglobin


  11.2 G/DL


(14.2-18.0)  L 11.0 G/DL


(14.2-18.0)  L 


 


 


Hematocrit


  33.8 %


(42.0-52.0)  L 33.0 %


(42.0-52.0)  L 


 


 


Mean Corpuscular Volume 88 FL (80-99)   86 FL (80-99)   


 


Mean Corpuscular Hemoglobin


  29.0 PG


(27.0-31.0) 28.7 PG


(27.0-31.0) 


 


 


Mean Corpuscular Hemoglobin


Concent 33.0 G/DL


(32.0-36.0) 33.3 G/DL


(32.0-36.0) 


 


 


Red Cell Distribution Width


  12.7 %


(11.6-14.8) 12.5 %


(11.6-14.8) 


 


 


Platelet Count


  284 K/UL


(150-450) 285 K/UL


(150-450) 


 


 


Mean Platelet Volume


  7.6 FL


(6.5-10.1) 7.3 FL


(6.5-10.1) 


 


 


Neutrophils (%) (Auto)


  81.0 %


(45.0-75.0)  H 78.4 %


(45.0-75.0)  H 


 


 


Lymphocytes (%) (Auto)


  8.8 %


(20.0-45.0)  L 11.6 %


(20.0-45.0)  L 


 


 


Monocytes (%) (Auto)


  9.4 %


(1.0-10.0) 9.2 %


(1.0-10.0) 


 


 


Eosinophils (%) (Auto)


  0.3 %


(0.0-3.0) 0.4 %


(0.0-3.0) 


 


 


Basophils (%) (Auto)


  0.5 %


(0.0-2.0) 0.5 %


(0.0-2.0) 


 


 


Sodium Level


  138 MMOL/L


(136-145) 141 MMOL/L


(136-145) 


 


 


Potassium Level


  3.5 MMOL/L


(3.5-5.1) 3.2 MMOL/L


(3.5-5.1)  L 


 


 


Chloride Level


  107 MMOL/L


() 108 MMOL/L


()  H 


 


 


Carbon Dioxide Level


  20 MMOL/L


(21-32)  L 22 MMOL/L


(21-32) 


 


 


Anion Gap


  11 mmol/L


(5-15) 11 mmol/L


(5-15) 


 


 


Blood Urea Nitrogen


  25 mg/dL


(7-18)  H 23 mg/dL


(7-18)  H 


 


 


Creatinine


  2.0 MG/DL


(0.55-1.30)  H 1.8 MG/DL


(0.55-1.30)  H 


 


 


Estimat Glomerular Filtration


Rate  mL/min (>60)  


   mL/min (>60)  


  


 


 


Glucose Level


  260 MG/DL


()  H 144 MG/DL


()  #H 


 


 


Calcium Level


  8.3 MG/DL


(8.5-10.1)  L 8.5 MG/DL


(8.5-10.1) 


 


 


Total Bilirubin


  0.5 MG/DL


(0.2-1.0) 


  


 


 


Aspartate Amino Transf


(AST/SGOT) 21 U/L (15-37)


  


  


 


 


Alanine Aminotransferase


(ALT/SGPT) 30 U/L (12-78)


  


  


 


 


Alkaline Phosphatase


  86 U/L


() 


  


 


 


Total Protein


  6.5 G/DL


(6.4-8.2) 


  


 


 


Albumin


  2.3 G/DL


(3.4-5.0)  L 


  


 


 


Globulin 4.2 g/dL    


 


Albumin/Globulin Ratio


  0.5 (1.0-2.7)


L 


  


 


 


Hemoglobin A1c


  


  8.4 %


(4.3-6.0)  H 


 


 


Random Vancomycin Level  11.7 ug/mL   


 


Urine Color   Pale yellow  


 


Urine Appearance   Clear  


 


Urine pH   5 (4.5-8.0)  


 


Urine Specific Gravity


  


  


  1.010


(1.005-1.035)


 


Urine Protein


  


  


  1+ (NEGATIVE)


H


 


Urine Glucose (UA)


  


  


  Negative


(NEGATIVE)


 


Urine Ketones


  


  


  Negative


(NEGATIVE)


 


Urine Blood


  


  


  3+ (NEGATIVE)


H


 


Urine Nitrite


  


  


  Negative


(NEGATIVE)


 


Urine Bilirubin


  


  


  Negative


(NEGATIVE)


 


Urine Urobilinogen


  


  


  Normal MG/DL


(0.0-1.0)


 


Urine Leukocyte Esterase


  


  


  Negative


(NEGATIVE)


 


Urine RBC


  


  


  20-30 /HPF (0


- 0)  H


 


Urine WBC


  


  


  0 /HPF (0 - 0)


 


 


Urine Squamous Epithelial


Cells 


  


  None /LPF


(NONE/OCC)


 


Urine Bacteria


  


  


  Occasional


/HPF (NONE)











Current Medications








 Medications


  (Trade)  Dose


 Ordered  Sig/Sherly


 Route


 PRN Reason  Start Time


 Stop Time Status Last Admin


Dose Admin


 


 Acetaminophen


  (Tylenol)  500 mg  Q6H  PRN


 ORAL


 Moderate Pain (Pain Scale 4-6)  10/6/19 10:15


 11/5/19 10:14   


 


 


 Apixaban


  (Eliquis)  2.5 mg  BID


 ORAL


   10/6/19 09:00


 11/4/19 17:59  10/6/19 08:18


 


 


 Aspirin


  (ASA)  81 mg  DAILY


 ORAL


   10/6/19 09:00


 11/3/19 11:14  10/6/19 08:17


 


 


 Atorvastatin


 Calcium


  (Lipitor)  20 mg  BEDTIME


 ORAL


   10/5/19 21:00


 11/3/19 20:59  10/5/19 20:21


 


 


 Cefepime HCl 2 gm/


 Dextrose  55 ml @ 


 110 mls/hr  Q24H


 IVPB


   10/6/19 09:00


 10/12/19 08:59  10/6/19 08:19


 


 


 Clopidogrel


 Bisulfate


  (Plavix)  75 mg  DAILY


 ORAL


   10/6/19 09:00


 11/3/19 11:14  10/6/19 08:18


 


 


 Dextrose


  (Dextrose 50%)  25 ml  Q30M  PRN


 IV


 Hypoglycemia  10/5/19 19:15


 11/3/19 11:14   


 


 


 Dextrose


  (Dextrose 50%)  50 ml  Q30M  PRN


 IV


 Hypoglycemia  10/5/19 19:15


 11/3/19 11:14   


 


 


 Famotidine


  (Pepcid I.v.)  20 mg  Q12HR


 IVP


   10/5/19 21:00


 11/3/19 20:59  10/6/19 08:17


 


 


 Furosemide


  (Lasix)  40 mg  EVERY 12  HOURS


 IV


   10/5/19 21:00


 11/3/19 08:59  10/6/19 08:17


 


 


 Insulin Aspart


  (NovoLOG)    Q6HR


 SUBQ


   10/6/19 00:00


 11/3/19 11:59  10/6/19 12:11


 


 


 Insulin Detemir


  (Levemir)  15 units  BEDTIME


 SUBQ


   10/5/19 21:00


 11/3/19 20:59  10/5/19 21:30


 


 


 Insulin Detemir


  (Levemir)  25 units  DAILY


 SUBQ


   10/6/19 09:00


 11/3/19 11:14  10/6/19 08:22


 


 


 Metoprolol


 Tartrate


  (Lopressor)  50 mg  TWICE A  DAY


 ORAL


   10/6/19 09:00


 11/3/19 17:59  10/6/19 08:18


 


 


 Metronidazole  100 ml @ 


 100 mls/hr  Q8HR


 IVPB


   10/5/19 22:00


 10/11/19 19:59  10/6/19 13:13


 


 


 Tramadol HCl


  (Ultram)  50 mg  Q6H  PRN


 ORAL


 Severe Pain (Pain Scale 7-10)  10/6/19 10:15


 10/13/19 10:14  10/6/19 13:11


 


 


 Vancomycin HCl


  (Vanco rx to


 dose)  1 ea  DAILY  PRN


 MISC


 Per rx protocol  10/6/19 09:00


 11/3/19 07:59   


 

















Eve Milan MD Oct 6, 2019 14:28

## 2019-10-07 VITALS — DIASTOLIC BLOOD PRESSURE: 74 MMHG | SYSTOLIC BLOOD PRESSURE: 153 MMHG

## 2019-10-07 VITALS — DIASTOLIC BLOOD PRESSURE: 88 MMHG | SYSTOLIC BLOOD PRESSURE: 145 MMHG

## 2019-10-07 VITALS — SYSTOLIC BLOOD PRESSURE: 135 MMHG | DIASTOLIC BLOOD PRESSURE: 60 MMHG

## 2019-10-07 VITALS — DIASTOLIC BLOOD PRESSURE: 70 MMHG | SYSTOLIC BLOOD PRESSURE: 133 MMHG

## 2019-10-07 VITALS — SYSTOLIC BLOOD PRESSURE: 119 MMHG | DIASTOLIC BLOOD PRESSURE: 70 MMHG

## 2019-10-07 VITALS — SYSTOLIC BLOOD PRESSURE: 138 MMHG | DIASTOLIC BLOOD PRESSURE: 69 MMHG

## 2019-10-07 VITALS — SYSTOLIC BLOOD PRESSURE: 145 MMHG | DIASTOLIC BLOOD PRESSURE: 70 MMHG

## 2019-10-07 LAB
ADD MANUAL DIFF: NO
ANION GAP SERPL CALC-SCNC: 10 MMOL/L (ref 5–15)
BASOPHILS NFR BLD AUTO: 0.9 % (ref 0–2)
BUN SERPL-MCNC: 20 MG/DL (ref 7–18)
CALCIUM SERPL-MCNC: 8.3 MG/DL (ref 8.5–10.1)
CHLORIDE SERPL-SCNC: 109 MMOL/L (ref 98–107)
CO2 SERPL-SCNC: 23 MMOL/L (ref 21–32)
CREAT SERPL-MCNC: 1.9 MG/DL (ref 0.55–1.3)
EOSINOPHIL NFR BLD AUTO: 2.4 % (ref 0–3)
ERYTHROCYTE [DISTWIDTH] IN BLOOD BY AUTOMATED COUNT: 12.4 % (ref 11.6–14.8)
HCT VFR BLD CALC: 31.1 % (ref 42–52)
HGB BLD-MCNC: 10.5 G/DL (ref 14.2–18)
LYMPHOCYTES NFR BLD AUTO: 22.9 % (ref 20–45)
MCV RBC AUTO: 86 FL (ref 80–99)
MONOCYTES NFR BLD AUTO: 11.1 % (ref 1–10)
NEUTROPHILS NFR BLD AUTO: 62.7 % (ref 45–75)
PHOSPHATE SERPL-MCNC: 3.2 MG/DL (ref 2.5–4.9)
PLATELET # BLD: 271 K/UL (ref 150–450)
POTASSIUM SERPL-SCNC: 3.1 MMOL/L (ref 3.5–5.1)
RBC # BLD AUTO: 3.63 M/UL (ref 4.7–6.1)
SODIUM SERPL-SCNC: 142 MMOL/L (ref 136–145)
WBC # BLD AUTO: 10.9 K/UL (ref 4.8–10.8)

## 2019-10-07 RX ADMIN — ASPIRIN 81 MG SCH MG: 81 TABLET ORAL at 08:50

## 2019-10-07 RX ADMIN — AMIODARONE HYDROCHLORIDE SCH MG: 200 TABLET ORAL at 21:27

## 2019-10-07 RX ADMIN — INSULIN ASPART SCH UNITS: 100 INJECTION, SOLUTION INTRAVENOUS; SUBCUTANEOUS at 17:16

## 2019-10-07 RX ADMIN — INSULIN ASPART SCH UNITS: 100 INJECTION, SOLUTION INTRAVENOUS; SUBCUTANEOUS at 06:00

## 2019-10-07 RX ADMIN — CARVEDILOL SCH MG: 25 TABLET, FILM COATED ORAL at 21:27

## 2019-10-07 RX ADMIN — APIXABAN SCH MG: 2.5 TABLET, FILM COATED ORAL at 08:51

## 2019-10-07 RX ADMIN — INSULIN ASPART SCH UNITS: 100 INJECTION, SOLUTION INTRAVENOUS; SUBCUTANEOUS at 23:30

## 2019-10-07 RX ADMIN — METOPROLOL TARTRATE SCH MG: 50 TABLET, FILM COATED ORAL at 08:50

## 2019-10-07 RX ADMIN — INSULIN ASPART SCH UNITS: 100 INJECTION, SOLUTION INTRAVENOUS; SUBCUTANEOUS at 12:36

## 2019-10-07 RX ADMIN — APIXABAN SCH MG: 2.5 TABLET, FILM COATED ORAL at 17:15

## 2019-10-07 RX ADMIN — ATORVASTATIN CALCIUM SCH MG: 20 TABLET, FILM COATED ORAL at 21:27

## 2019-10-07 RX ADMIN — SODIUM CHLORIDE SCH MLS/HR: 0.9 INJECTION INTRAVENOUS at 08:51

## 2019-10-07 RX ADMIN — INSULIN ASPART SCH UNITS: 100 INJECTION, SOLUTION INTRAVENOUS; SUBCUTANEOUS at 00:25

## 2019-10-07 RX ADMIN — INSULIN DETEMIR SCH UNITS: 100 INJECTION, SOLUTION SUBCUTANEOUS at 23:30

## 2019-10-07 NOTE — GENERAL PROGRESS NOTE
Assessment/Plan


Problem List:  


(1) HTN (hypertension)


ICD Codes:  I10 - Essential (primary) hypertension


SNOMED:  40483150


(2) DM (diabetes mellitus)


ICD Codes:  E11.9 - Type 2 diabetes mellitus without complications


SNOMED:  65969159


(3) Acute exacerbation of CHF (congestive heart failure)


ICD Codes:  I50.9 - Heart failure, unspecified


SNOMED:  967335810, 42219618214439


Qualifiers:  


   Qualified Codes:  I50.9 - Heart failure, unspecified


(4) Atrial fibrillation with rapid ventricular response


ICD Codes:  I48.91 - Unspecified atrial fibrillation


SNOMED:  339870988201365


(5) Hypoxemia


ICD Codes:  R09.02 - Hypoxemia


SNOMED:  156245219


(6) MERLYN (acute kidney injury)


ICD Codes:  N17.9 - Acute kidney failure, unspecified


SNOMED:  98435623, 5544496


(7) Cellulitis in diabetic foot


ICD Codes:  E11.628 - Type 2 diabetes mellitus with other skin complications; 

L03.119 - Cellulitis of unspecified part of limb


SNOMED:  396147174, 92213526


(8) Sepsis


ICD Codes:  A41.9 - Sepsis, unspecified organism


SNOMED:  63095251, 41332941


Qualifiers:  


   Qualified Codes:  A41.9 - Sepsis, unspecified organism; R65.20 - Severe 

sepsis without septic shock; J96.01 - Acute respiratory failure with hypoxia


Status:  stable


Assessment/Plan:


This is a 74 year old male with a PMHx Dm2, HTN, CHF, PAD s/p left iliac stent 

placement 10/25/19 and subsequent left 2nd toe amputation for gangrene/

osteomyelitis, CAD who presents with acute hypoxic respiratory failure 2/2 CHF 

exacerbation and sepsis. 





#acute hypoxic respiratory failure 2/2 systolic CHF exacerbation (new diagnosis)

- no known history of CHF. Also treating for possible pneumonia - IMPROVING


- telemetry


- titrate SpO2 to keep O2 >92%


- f/u blood cultures. 1/4 sets gram  + bailey. Likely contaminant per ID


- f/u sputum culture


- Vancomycin, Cefepime and Flagyl Day 4


- Continue lasix 40mg IV BID per discussion with cardiology


- may change to PO lasix tomorrow


- Start afterload reduction with hydralazine 10mg PO TID and Imdur 30mg Po 

daily per cardiology


- ACE-I contraindicated in setting of acute renal failure


- Per cardiology given low EF will need ischemia workup. 


- appreciate Cardiology recommendations: Dr. Wright/Dr. Malave


- Appreciate Infectious Disease recommendations: Dr. Bergman





#Atrial fibrillation with RVR, new diagnosis. - IMPROVINg, althought still with 

episodes of tachycardia


CHADS-VASC 5 - risk of stroke 7.2% per year


HASBLED - 3 - risk of bleeding 5.8% per year


> Explained to patient risks and benefits of anticoagulation with Asa/Plavix 

and an anticoagulant. Patient understands the risks of stroke are > than risk 

of bleeding and would like to take the anticoagulant. Will start patient on 

eliquis renal dosing 2.5mg PO BID


- s/p Diltiazem Gtt


- Coreg and digoxin for heart rate controlled


- appreicate cardiology recommendations





#sepsis (WBC, heart rate, respiratory rate) 2/2 pneumonia - IMPROVING


#Clostridium perfingens in 1/2 blood culture- likely contaminant per ID


> C diff negative


> ESR 74, CRP 1.4, unlikely osteomyelitis


- cultures as above


- Repeat blood cultures: negative so far


- antibiotics as above





#acute urinary retention, new problem


> UA and urine culture negative


- Inserted benjamin on 10/6/10


- Tamsulosin 0.4mg PO daily


- Appreciate urology recommendations: Dr. Jiang.


- Per urology continue benjamin on discharge. 





#Acute on Chronic Renal failure 2/2 CHF exacerbation and urinary obstruction - 

Stabilizing. Not back to baseline yet


> Baseline Cr 1.5


- IV diuresis as above


- continue to trend 


- avoid nephrotoxins


- replace lytes


- Renal ultrasound today





#Hypertension


#hyperlipidemia


#CAD


-hold home amlodipine


- continue metoprolol as above


- ASA/Plavix as above





#hypokalemia


#hypomagnesemia


- replete


- continue to monitor with labs





 #lactic acidosis suspected 2/2 CHF exacerbation vs. sepsis - RESOLVED


- trended down to 1


- management as above





#S/p left 2nd toe amputation due to gangrene/osteomyelitis


#s/p recent left iliac stent on ASA/Plavix 10 days ago.


> ESR 73, CRP 1.4


- wound consult


- request records from [facility]





#Diabetes mellitus type 1 per patient (suspect this is an error and has type 2) 

with hyperglycemia-  improved


> Hemoglobin A1C 8.6 on 10/6/10


- accuchecks Q6hr


- Diabetic diet


- Levemir 25 units QAM and 15 units QPM (home is 25 and 30)


- SSI, average





FENPPx


DVT PPx: eliquis


GI PPX: pepcid


Fluids: none


Diet: Diabetic diet


Lines: none


PT/OT: pending





Dispo: SNF vs. Home. Pending PT evaluation. 





D/w RN, patient, patient's family, and Cardiology. I spent 38 minutes on this 

encounter. >50% spent on counseling and care coordination. 





Time of note may not reflect time patient was seen





Subjective


Date patient seen:  Oct 7, 2019


Constitutional:  Denies: chills, diaphoresis, fever, malaise, weakness, other


HEENT:  Denies: eye pain, blurred vision, tearing, double vision, ear pain, ear 

discharge, nose pain, nose congestion, throat pain, throat swelling, mouth pain

, mouth swelling, other


Cardiovascular:  Denies: chest pain, edema, irregular heart rate, 

lightheadedness, palpitations, syncope, other


Respiratory:  Denies: cough, orthopnea, shortness of breath, SOB with excertion

, SOB at rest, sputum, stridor, wheezing, other


Gastrointestinal/Abdominal:  Denies: abdomen distended, abdominal pain, black 

stools, tarry stools, blood in stool, constipated, diarrhea, difficulty 

swallowing, nausea, poor appetite, poor fluid intake, rectal bleeding, vomiting

, other


Genitourinary:  Denies: burning, discharge, frequency, flank pain, hematuria, 

incontinence, pain, urgency, other


Neurologic/Psychiatric:  Denies: anxiety, depressed, emotional problems, 

headache, numbness, paresthesia, pre-existing deficit, seizure, tingling, 

tremors, weakness, other


Endocrine:  Denies: excessive sweating, flushing, intolerance to cold, 

intolerance to heat, increased hunger, increased thirst, increased urine, 

unexplained weight gain, unexplained weight loss, other


Hematologic/Lymphatic:  Denies: anemia, easy bleeding, easy bruising, other


Allergies:  


Coded Allergies:  


     No Known Allergies (Unverified , 6/2/16)


Subjective


No acute events overnight per nursing. Still having episodes of atrial 

fibrillation up to 160s. Blood pressure stable. Titrated down to room air 

today. SOB improved. Denies cough, cold, fever, chills. No chest pain. 


Urinary retention: Benjamin placed. Urology will see. No history of BPH. new 

issue. Patient with some suprapubic discomfort. No fevers, chills, nausea, 

vomiting or back pain. 


Denies chest pain, palpitations, fever, chills.





Objective





Last 24 Hour Vital Signs








  Date Time  Temp Pulse Resp B/P (MAP) Pulse Ox O2 Delivery O2 Flow Rate FiO2


 


10/7/19 12:00 98.1 100 18 145/88 (107) 99   


 


10/7/19 12:00  103      


 


10/7/19 12:00      Room Air  


 


10/7/19 08:50  98  133/70    


 


10/7/19 08:00      Room Air  


 


10/7/19 08:00 98.1 98 20 133/70 (91) 98   


 


10/7/19 08:00  108      


 


10/7/19 04:00      Room Air  


 


10/7/19 04:00 98.3 99 18 135/60 (85) 97   


 


10/7/19 04:00  97      


 


10/7/19 00:00      Room Air  


 


10/7/19 00:00 98.1 96 20 119/70 (86) 96   


 


10/6/19 20:00      Room Air  


 


10/6/19 20:00 98.1 89 16 142/76 (98) 98   


 


10/6/19 20:00  86      


 


10/6/19 20:00  95 20  99 Nasal Cannula 3.0 32


 


10/6/19 20:00     99 Nasal Cannula 3.0 32


 


10/6/19 17:14  94  144/71    


 


10/6/19 16:00  95      


 


10/6/19 16:00       2.0 


 


10/6/19 16:00 98.3 94 21 144/71 (95) 99   


 


10/6/19 16:00      Nasal Cannula 3.0 

















Intake and Output  


 


 10/6/19 10/7/19





 19:00 07:00


 


Intake Total  100 ml


 


Output Total 1550 ml 2000 ml


 


Balance -1550 ml -1900 ml


 


  


 


Intake Oral  100 ml


 


Output Urine Total 1550 ml 2000 ml


 


# Voids 1 


 


# Bowel Movements 2 








Laboratory Tests


10/7/19 03:57: 


White Blood Count 10.9H, Red Blood Count 3.63L, Hemoglobin 10.5L, Hematocrit 

31.1L, Mean Corpuscular Volume 86, Mean Corpuscular Hemoglobin 28.9, Mean 

Corpuscular Hemoglobin Concent 33.7, Red Cell Distribution Width 12.4, Platelet 

Count 271, Mean Platelet Volume 7.1, Neutrophils (%) (Auto) 62.7, Lymphocytes (%

) (Auto) 22.9, Monocytes (%) (Auto) 11.1H, Eosinophils (%) (Auto) 2.4, 

Basophils (%) (Auto) 0.9, Sodium Level 142, Potassium Level 3.1L, Chloride 

Level 109H, Carbon Dioxide Level 23, Anion Gap 10, Blood Urea Nitrogen 20H, 

Creatinine 1.9H, Estimat Glomerular Filtration Rate , Glucose Level 68L, 

Calcium Level 8.3L, Phosphorus Level 3.2, Magnesium Level 1.6L


Height (Feet):  5


Height (Inches):  6.00


Weight (Pounds):  164


General Appearance:  WD/WN, no apparent distress, alert


EENT:  PERRL/EOMI, TMs normal


Neck:  non-tender, normal alignment, supple


Cardiovascular:  normal peripheral pulses, other - irregular irregular, no 

mumrurs/rubs/gallops


Respiratory/Chest:  chest wall non-tender, other - bibasilar crackles, slight


Abdomen:  normal bowel sounds, non tender, soft


Pelvis:  other


Genitourinary/Rectal:  other - benjamin in place draining clear urine


Extremities:  normal range of motion, non-tender, other - trace LE edema 

bilatearlly


Edema:  trace edema


Neurologic:  CNs II-XII grossly normal, no motor/sensory deficits, abnormal gait

, alert, oriented x 3, normal mood/affect


Skin:  normal pigmentation, warm/dry











Reynaldo Schaffer D.O. Oct 7, 2019 14:24

## 2019-10-07 NOTE — NUR
*-* INSURANCE *-*



ALL CLINICALS AND REVIEWS HAVE BEEN FAXED TO:



[facility]

TRK# [id number]

F: [phone]

CARE COORDINATOR: JP CAMACHO

P: [phone]



&



[facility]

NCM: CHLOÉ

P: [phone]

F: [phone]

## 2019-10-07 NOTE — NUR
HAND-OFF: 

Report given to Jasmin Miller RN. Pt is stable, SR, denies pain, no signs or symptoms of 
distress at this time. Pt sating at 97% on room air. Pt is alert and oriented times 3.

## 2019-10-07 NOTE — NUR
TRANSFER TO FLOOR:

Patient transferred to Tele room 207-1, per Arnoldo. Report given to BRANDON Mcdaniel.  
Belongings and medications given to Jonas TAYLOR, item list verified and signed. Pt is stable, 
ST, and no signs or symptoms of distress noted at this time.

## 2019-10-07 NOTE — NUR
NURSE NOTES:

Potassium 3.1, mag 1.6. Dr. Leonardo made aware and new order for potassium 20mEq PO once.

## 2019-10-07 NOTE — DIAGNOSTIC IMAGING REPORT
Indication: Acute renal failure

 

Technique: Grayscale and duplex images of the kidneys, retroperitoneum, and bladder

were obtained.

 

Comparison: none            

 

Findings: Right kidney measures 10.2 cm in length. Left kidney measures 10.4 cm in

length. Both kidneys demonstrate normal echogenicity. No hydronephrosis.  Small cysts

in both kidneys.. Normal inferior vena cava. Bladder is nearly empty, contains a

An catheter and a small amount of residual urine. Possible nonobstructive

peripheral calcification is seen in the left kidney.

 

Impression: Negative for hydronephrosis

 

Bilateral renal cysts. Questionable nonobstructive left intrarenal calculus

 

An catheter. Small amount retained urine despite the presence of such.

## 2019-10-07 NOTE — NUR
SI         SEPSIS, CHF, ACUTE KIDNEY INJURY

            HR  98, /71, RR  20. 98.3

            WBC. 10.9, BUN. 20, CR  1.9





IS         LASIX IV Q12H

  CEFEPIME Q24H





*******SDU STATUS**********

## 2019-10-07 NOTE — PULMONOLOGY PROGRESS NOTE
Assessment/Plan


Problems:  


(1) Atrial fibrillation with rapid ventricular response


(2) Acute exacerbation of CHF (congestive heart failure)


(3) Acute kidney injury superimposed on CKD


(4) Gram positive bacterial infection


(5) DM (diabetes mellitus)


(6) HTN (hypertension)


(7) Hypoxemia


Assessment/Plan


Optimize pulmonary hygiene/mobilize as tolerated


Titrate down FiO2 to keep SaO2 > 90%


Continue Cefepime/Vanco/Flagyl (D4) per ID


F/U labs sent to CS: PCt and [facility]


F/U CARDS/EPS recs ---> PO MTP, Eliquis, diuresis


Monitor volumes and renal function, diuresis as able


Glycemic control


Aspiration precautions


DVT Px: Eliquis


FC





Subjective


Allergies:  


Coded Allergies:  


     No Known Allergies (Unverified , 6/2/16)


Subjective


In out of AFcR[facility] 


On RA


no cough + SOB no CP no FC





Objective





Last 24 Hour Vital Signs








  Date Time  Temp Pulse Resp B/P (MAP) Pulse Ox O2 Delivery O2 Flow Rate FiO2


 


10/7/19 08:50  98  133/70    


 


10/7/19 08:00      Room Air  


 


10/7/19 08:00 98.1 98 20 133/70 (91) 98   


 


10/7/19 08:00  108      


 


10/7/19 04:00      Room Air  


 


10/7/19 04:00 98.3 99 18 135/60 (85) 97   


 


10/7/19 04:00  97      


 


10/7/19 00:00      Room Air  


 


10/7/19 00:00 98.1 96 20 119/70 (86) 96   


 


10/6/19 20:00      Room Air  


 


10/6/19 20:00 98.1 89 16 142/76 (98) 98   


 


10/6/19 20:00  86      


 


10/6/19 20:00  95 20  99 Nasal Cannula 3.0 32


 


10/6/19 20:00     99 Nasal Cannula 3.0 32


 


10/6/19 17:14  94  144/71    


 


10/6/19 16:00  95      


 


10/6/19 16:00       2.0 


 


10/6/19 16:00 98.3 94 21 144/71 (95) 99   


 


10/6/19 16:00      Nasal Cannula 3.0 

















Intake and Output  


 


 10/6/19 10/7/19





 19:00 07:00


 


Intake Total  100 ml


 


Output Total 1550 ml 2000 ml


 


Balance -1550 ml -1900 ml


 


  


 


Intake Oral  100 ml


 


Output Urine Total 1550 ml 2000 ml


 


# Voids 1 


 


# Bowel Movements 2 








General Appearance:  WD/WN, no acute distress


HEENT:  normocephalic, atraumatic, anicteric, mucous membranes moist


Respiratory/Chest:  chest wall non-tender, lungs clear, normal breath sounds, 

no respiratory distress, no accessory muscle use


Cardiovascular:  irregularly irregular


Abdomen:  normal bowel sounds, soft, non tender, no organomegaly, non distended

, no mass


Extremities:  no cyanosis, no clubbing, other - trace edema





Microbiology








 Date/Time


Source Procedure


Growth Status


 


 


 10/5/19 14:35


Blood Blood Culture - Preliminary


NO GROWTH AFTER 24 HOURS Resulted


 


 10/5/19 14:20


Blood Blood Culture - Preliminary


NO GROWTH AFTER 24 HOURS Resulted


 


 10/6/19 09:50


Straight Cath Urine Culture - Preliminary


NO GROWTH Resulted








Laboratory Tests


10/7/19 03:57: 


White Blood Count 10.9H, Red Blood Count 3.63L, Hemoglobin 10.5L, Hematocrit 

31.1L, Mean Corpuscular Volume 86, Mean Corpuscular Hemoglobin 28.9, Mean 

Corpuscular Hemoglobin Concent 33.7, Red Cell Distribution Width 12.4, Platelet 

Count 271, Mean Platelet Volume 7.1, Neutrophils (%) (Auto) 62.7, Lymphocytes (%

) (Auto) 22.9, Monocytes (%) (Auto) 11.1H, Eosinophils (%) (Auto) 2.4, 

Basophils (%) (Auto) 0.9, Sodium Level 142, Potassium Level 3.1L, Chloride 

Level 109H, Carbon Dioxide Level 23, Anion Gap 10, Blood Urea Nitrogen 20H, 

Creatinine 1.9H, Estimat Glomerular Filtration Rate , Glucose Level 68L, 

Calcium Level 8.3L, Phosphorus Level 3.2, Magnesium Level 1.6L





Current Medications








 Medications


  (Trade)  Dose


 Ordered  Sig/Sherly


 Route


 PRN Reason  Start Time


 Stop Time Status Last Admin


Dose Admin


 


 Acetaminophen


  (Tylenol)  500 mg  Q6H  PRN


 ORAL


 Moderate Pain (Pain Scale 4-6)  10/6/19 10:15


 11/5/19 10:14  10/6/19 21:46


 


 


 Amiodarone HCl


  (Cordarone)  400 mg  EVERY 12  HOURS


 ORAL


   10/7/19 21:00


 11/6/19 20:59   


 


 


 Apixaban


  (Eliquis)  2.5 mg  BID


 ORAL


   10/6/19 09:00


 11/4/19 17:59  10/7/19 08:51


 


 


 Aspirin


  (ASA)  81 mg  DAILY


 ORAL


   10/6/19 09:00


 11/3/19 11:14  10/7/19 08:50


 


 


 Atorvastatin


 Calcium


  (Lipitor)  20 mg  BEDTIME


 ORAL


   10/5/19 21:00


 11/3/19 20:59  10/6/19 21:11


 


 


 Carvedilol


  (Coreg)  25 mg  EVERY 12  HOURS


 ORAL


   10/7/19 21:00


 11/6/19 20:59   


 


 


 Cefepime HCl 2 gm/


 Dextrose  55 ml @ 


 110 mls/hr  Q24H


 IVPB


   10/6/19 09:00


 10/12/19 08:59  10/7/19 08:51


 


 


 Clopidogrel


 Bisulfate


  (Plavix)  75 mg  DAILY


 ORAL


   10/6/19 09:00


 11/3/19 11:14  10/7/19 08:50


 


 


 Dextrose


  (Dextrose 50%)  25 ml  Q30M  PRN


 IV


 Hypoglycemia  10/5/19 19:15


 11/3/19 11:14   


 


 


 Dextrose


  (Dextrose 50%)  50 ml  Q30M  PRN


 IV


 Hypoglycemia  10/5/19 19:15


 11/3/19 11:14   


 


 


 Famotidine


  (Pepcid I.v.)  20 mg  Q12HR


 IVP


   10/5/19 21:00


 11/3/19 20:59  10/7/19 08:50


 


 


 Furosemide


  (Lasix)  40 mg  EVERY 12  HOURS


 IV


   10/5/19 21:00


 11/3/19 08:59  10/7/19 08:49


 


 


 Hydralazine HCl


  (Apresoline)  10 mg  BID


 ORAL


   10/7/19 18:00


 11/6/19 17:59   


 


 


 Insulin Aspart


  (NovoLOG)    AC+HS


 SUBQ


   10/7/19 11:30


 11/3/19 11:59   


 


 


 Insulin Detemir


  (Levemir)  15 units  BEDTIME


 SUBQ


   10/5/19 21:00


 11/3/19 20:59  10/6/19 21:26


 


 


 Insulin Detemir


  (Levemir)  25 units  DAILY


 SUBQ


   10/6/19 09:00


 11/3/19 11:14  10/6/19 21:25


 


 


 Isosorbide


 Mononitrate


  (Imdur)  30 mg  DAILY


 ORAL


   10/8/19 09:00


 11/7/19 08:59   


 


 


 Metronidazole  100 ml @ 


 100 mls/hr  Q8HR


 IVPB


   10/5/19 22:00


 10/11/19 19:59  10/7/19 06:22


 


 


 Tamsulosin HCl


  (Flomax)  0.4 mg  BEDTIME


 ORAL


   10/6/19 21:00


 11/5/19 20:59  10/6/19 21:11


 


 


 Tramadol HCl


  (Ultram)  50 mg  Q6H  PRN


 ORAL


 Severe Pain (Pain Scale 7-10)  10/6/19 10:15


 10/13/19 10:14  10/6/19 13:11


 


 


 Vancomycin HCl


  (Vanco rx to


 dose)  1 ea  DAILY  PRN


 MISC


 Per rx protocol  10/6/19 09:00


 11/3/19 07:59   


 

















Ace Fraire MD Oct 7, 2019 12:13

## 2019-10-07 NOTE — NUR
PT EVALUATION NOTE

Patient seen for initial evaluation, see complete evaluation for details. Patient presents 
with generalized weakness and impaired balance which affects patient's ability to perform 
mobility tasks. Patient requires min assist for bed mobility and CGA for transfers with FWW. 
Patient required verbal cueing to maintain WB precautions LLE. Patient able to ambulate 15 
ft with FWW with PWB LLE, limited by dizziness. Patient will benefit from skilled inpatient 
PT intervention to address strength, balance and safety for improved level of functional 
mobility. Recommend discharge home with home PT vs short term 

SNF for rehab once medically cleared by MD. Patient appears to have necessary DME at home. 

-------------------------------------------------------------------------------

Addendum: 10/07/19 at 1321 by MELISSA DE JESUS PT

-------------------------------------------------------------------------------

Amended: Links added.

## 2019-10-07 NOTE — NUR
NURSE NOTES:

received pt in the bed, awake, alert, oriented, vital signs stable, no co pain, no SOB, skin 
warm and dry to touch, intact, tolerate diet well, pt on RA, An catheter, bed in low 
position, call light within reach, pt had episode of HR up to 160, no chest pain, dr. Malave 
aware.

## 2019-10-07 NOTE — NUR
NURSE NOTES:

Received report from Jasmin Miller RN Pt is stable, sinus tach with 1st degree HB and PACs. 
Left wrist 20g and R hand 24g intact, patent, flushed with 10ml NS, asymptomatic. Denies 
npain, no signs or symptoms of distress noted at this time. Will continue to monitor 
closely.

## 2019-10-07 NOTE — NUR
NURSE NOTES:

no any distress at this time, no co chest pain, vital signs stable, continue monitoring.

## 2019-10-07 NOTE — CARDIAC ELECTROPHYSIOLOGY PN
Assessment/Plan


Assessment/Plan


1. CHF exacerbation. Echocardiogram showed EF 30-35%. Continue Lasix 40 iv bid, 

Add Hydralazine 10 bid and Imdur 30 daily and Aldactone 25 daily.


    Likely needs cardiac cath after stabilization of atrial fib. Will get prior 

Echo. If EF less than 35% for 3 months will need ICD implant


2. Atrial fib with RVR 160s. In and out. Add Amiodarone 400 bid and Dig 0.125 

daily.Change Lopressor to Coreg 25 bid. On Eliquis


3. Uncontrolled diabetes, on insulin.


4. Severe peripheral vascular disease, status post left second toe gangrene 

amputation for osteomyelitis followed by Podiatry and Vascular


Surgery.On Aspirin and Plavix








DW RN and Dr Gallagher





Subjective


Subjective


In and out of atrial fib with RVR 160s. No CP. EF 30-35%





Objective





Last 24 Hour Vital Signs








  Date Time  Temp Pulse Resp B/P (MAP) Pulse Ox O2 Delivery O2 Flow Rate FiO2


 


10/7/19 08:50  98  133/70    


 


10/7/19 08:00      Room Air  


 


10/7/19 08:00 98.1 98 20 133/70 (91) 98   


 


10/7/19 08:00  108      


 


10/7/19 04:00      Room Air  


 


10/7/19 04:00 98.3 99 18 135/60 (85) 97   


 


10/7/19 04:00  97      


 


10/7/19 00:00      Room Air  


 


10/7/19 00:00 98.1 96 20 119/70 (86) 96   


 


10/6/19 20:00      Room Air  


 


10/6/19 20:00 98.1 89 16 142/76 (98) 98   


 


10/6/19 20:00  86      


 


10/6/19 20:00  95 20  99 Nasal Cannula 3.0 32


 


10/6/19 20:00     99 Nasal Cannula 3.0 32


 


10/6/19 17:14  94  144/71    


 


10/6/19 16:00  95      


 


10/6/19 16:00       2.0 


 


10/6/19 16:00 98.3 94 21 144/71 (95) 99   


 


10/6/19 16:00      Nasal Cannula 3.0 


 


10/6/19 12:00 98.3 96 21 149/75 (99) 100   


 


10/6/19 12:00  97      


 


10/6/19 12:00       2.0 


 


10/6/19 12:00      Nasal Cannula 3.0 

















Intake and Output  


 


 10/6/19 10/7/19





 19:00 07:00


 


Intake Total  100 ml


 


Output Total 1550 ml 2000 ml


 


Balance -1550 ml -1900 ml


 


  


 


Intake Oral  100 ml


 


Output Urine Total 1550 ml 2000 ml


 


# Voids 1 


 


# Bowel Movements 2 











Laboratory Tests








Test


  10/7/19


03:57


 


White Blood Count


  10.9 K/UL


(4.8-10.8)  H


 


Red Blood Count


  3.63 M/UL


(4.70-6.10)  L


 


Hemoglobin


  10.5 G/DL


(14.2-18.0)  L


 


Hematocrit


  31.1 %


(42.0-52.0)  L


 


Mean Corpuscular Volume 86 FL (80-99)  


 


Mean Corpuscular Hemoglobin


  28.9 PG


(27.0-31.0)


 


Mean Corpuscular Hemoglobin


Concent 33.7 G/DL


(32.0-36.0)


 


Red Cell Distribution Width


  12.4 %


(11.6-14.8)


 


Platelet Count


  271 K/UL


(150-450)


 


Mean Platelet Volume


  7.1 FL


(6.5-10.1)


 


Neutrophils (%) (Auto)


  62.7 %


(45.0-75.0)


 


Lymphocytes (%) (Auto)


  22.9 %


(20.0-45.0)


 


Monocytes (%) (Auto)


  11.1 %


(1.0-10.0)  H


 


Eosinophils (%) (Auto)


  2.4 %


(0.0-3.0)


 


Basophils (%) (Auto)


  0.9 %


(0.0-2.0)


 


Sodium Level


  142 MMOL/L


(136-145)


 


Potassium Level


  3.1 MMOL/L


(3.5-5.1)  L


 


Chloride Level


  109 MMOL/L


()  H


 


Carbon Dioxide Level


  23 MMOL/L


(21-32)


 


Anion Gap


  10 mmol/L


(5-15)


 


Blood Urea Nitrogen


  20 mg/dL


(7-18)  H


 


Creatinine


  1.9 MG/DL


(0.55-1.30)  H


 


Estimat Glomerular Filtration


Rate  mL/min (>60)  


 


 


Glucose Level


  68 MG/DL


()  L


 


Calcium Level


  8.3 MG/DL


(8.5-10.1)  L


 


Phosphorus Level


  3.2 MG/DL


(2.5-4.9)


 


Magnesium Level


  1.6 MG/DL


(1.8-2.4)  L











Microbiology








 Date/Time


Source Procedure


Growth Status


 


 


 10/5/19 14:35


Blood Blood Culture - Preliminary


NO GROWTH AFTER 24 HOURS Resulted


 


 10/5/19 14:20


Blood Blood Culture - Preliminary


NO GROWTH AFTER 24 HOURS Resulted


 


 10/6/19 09:50


Straight Cath Urine Culture - Preliminary


NO GROWTH Resulted








Objective





HEAD AND NECK:  Positive JVD.


LUNGS:  Decreased breath sounds and bibasilar rales.


CARDIOVASCULAR: Tachycardic, S1 and S2 with no gallop.


ABDOMEN:  Soft.


EXTREMITIES:  1+ pitting edema.











Tristen Malave MD Oct 7, 2019 11:26

## 2019-10-07 NOTE — NUR
NURSE NOTES:

Pt transferred from SDU. Got report from Cyndi TAYLOR. Pt in stable condition. No s/s of 
distress or discomfort noted. Denies pain or n/v. Pt resting in bed comfortably. VS 
BP:153/74 HR:107 R:20 T:98.7 O2:95% on room air. Bed in low and locked position, call light 
within reach, bedside table within reach. Continue to monitor.

## 2019-10-08 VITALS — DIASTOLIC BLOOD PRESSURE: 46 MMHG | SYSTOLIC BLOOD PRESSURE: 114 MMHG

## 2019-10-08 VITALS — SYSTOLIC BLOOD PRESSURE: 140 MMHG | DIASTOLIC BLOOD PRESSURE: 74 MMHG

## 2019-10-08 VITALS — SYSTOLIC BLOOD PRESSURE: 142 MMHG | DIASTOLIC BLOOD PRESSURE: 70 MMHG

## 2019-10-08 VITALS — DIASTOLIC BLOOD PRESSURE: 68 MMHG | SYSTOLIC BLOOD PRESSURE: 135 MMHG

## 2019-10-08 VITALS — DIASTOLIC BLOOD PRESSURE: 54 MMHG | SYSTOLIC BLOOD PRESSURE: 120 MMHG

## 2019-10-08 VITALS — SYSTOLIC BLOOD PRESSURE: 139 MMHG | DIASTOLIC BLOOD PRESSURE: 75 MMHG

## 2019-10-08 VITALS — DIASTOLIC BLOOD PRESSURE: 74 MMHG | SYSTOLIC BLOOD PRESSURE: 140 MMHG

## 2019-10-08 LAB
ADD MANUAL DIFF: NO
ANION GAP SERPL CALC-SCNC: 10 MMOL/L (ref 5–15)
ANION GAP SERPL CALC-SCNC: 9 MMOL/L (ref 5–15)
BASOPHILS NFR BLD AUTO: 0.7 % (ref 0–2)
BUN SERPL-MCNC: 21 MG/DL (ref 7–18)
BUN SERPL-MCNC: 21 MG/DL (ref 7–18)
CALCIUM SERPL-MCNC: 8.4 MG/DL (ref 8.5–10.1)
CALCIUM SERPL-MCNC: 8.4 MG/DL (ref 8.5–10.1)
CHLORIDE SERPL-SCNC: 107 MMOL/L (ref 98–107)
CHLORIDE SERPL-SCNC: 107 MMOL/L (ref 98–107)
CK MB SERPL-MCNC: 1.2 NG/ML (ref 0–3.6)
CK SERPL-CCNC: 28 U/L (ref 26–308)
CO2 SERPL-SCNC: 24 MMOL/L (ref 21–32)
CO2 SERPL-SCNC: 25 MMOL/L (ref 21–32)
CREAT SERPL-MCNC: 1.9 MG/DL (ref 0.55–1.3)
CREAT SERPL-MCNC: 2 MG/DL (ref 0.55–1.3)
EOSINOPHIL NFR BLD AUTO: 0.9 % (ref 0–3)
ERYTHROCYTE [DISTWIDTH] IN BLOOD BY AUTOMATED COUNT: 12.8 % (ref 11.6–14.8)
HCT VFR BLD CALC: 30.4 % (ref 42–52)
HGB BLD-MCNC: 10.2 G/DL (ref 14.2–18)
LYMPHOCYTES NFR BLD AUTO: 13.8 % (ref 20–45)
MCV RBC AUTO: 86 FL (ref 80–99)
MONOCYTES NFR BLD AUTO: 8.7 % (ref 1–10)
NEUTROPHILS NFR BLD AUTO: 75.9 % (ref 45–75)
PHOSPHATE SERPL-MCNC: 3.5 MG/DL (ref 2.5–4.9)
PLATELET # BLD: 274 K/UL (ref 150–450)
POTASSIUM SERPL-SCNC: 3.6 MMOL/L (ref 3.5–5.1)
POTASSIUM SERPL-SCNC: 3.8 MMOL/L (ref 3.5–5.1)
RBC # BLD AUTO: 3.54 M/UL (ref 4.7–6.1)
SODIUM SERPL-SCNC: 141 MMOL/L (ref 136–145)
SODIUM SERPL-SCNC: 141 MMOL/L (ref 136–145)
WBC # BLD AUTO: 14.2 K/UL (ref 4.8–10.8)

## 2019-10-08 RX ADMIN — ISOSORBIDE MONONITRATE SCH MG: 30 TABLET, EXTENDED RELEASE ORAL at 10:44

## 2019-10-08 RX ADMIN — PANTOPRAZOLE SODIUM SCH MG: 40 INJECTION, POWDER, FOR SOLUTION INTRAVENOUS at 21:22

## 2019-10-08 RX ADMIN — INSULIN ASPART SCH UNITS: 100 INJECTION, SOLUTION INTRAVENOUS; SUBCUTANEOUS at 06:25

## 2019-10-08 RX ADMIN — INSULIN DETEMIR SCH UNITS: 100 INJECTION, SOLUTION SUBCUTANEOUS at 21:26

## 2019-10-08 RX ADMIN — ATORVASTATIN CALCIUM SCH MG: 20 TABLET, FILM COATED ORAL at 21:23

## 2019-10-08 RX ADMIN — APIXABAN SCH MG: 2.5 TABLET, FILM COATED ORAL at 10:46

## 2019-10-08 RX ADMIN — TAMSULOSIN HYDROCHLORIDE SCH MG: 0.4 CAPSULE ORAL at 17:54

## 2019-10-08 RX ADMIN — INSULIN ASPART SCH UNITS: 100 INJECTION, SOLUTION INTRAVENOUS; SUBCUTANEOUS at 18:01

## 2019-10-08 RX ADMIN — APIXABAN SCH MG: 2.5 TABLET, FILM COATED ORAL at 18:00

## 2019-10-08 RX ADMIN — AMIODARONE HYDROCHLORIDE SCH MG: 200 TABLET ORAL at 21:24

## 2019-10-08 RX ADMIN — INSULIN DETEMIR SCH UNITS: 100 INJECTION, SOLUTION SUBCUTANEOUS at 11:26

## 2019-10-08 RX ADMIN — HYDRALAZINE HYDROCHLORIDE SCH MG: 25 TABLET ORAL at 14:17

## 2019-10-08 RX ADMIN — CARVEDILOL SCH MG: 25 TABLET, FILM COATED ORAL at 21:22

## 2019-10-08 RX ADMIN — DOCUSATE SODIUM SCH MG: 100 CAPSULE, LIQUID FILLED ORAL at 17:55

## 2019-10-08 RX ADMIN — CARVEDILOL SCH MG: 25 TABLET, FILM COATED ORAL at 10:47

## 2019-10-08 RX ADMIN — AMIODARONE HYDROCHLORIDE SCH MG: 200 TABLET ORAL at 10:46

## 2019-10-08 RX ADMIN — HYDRALAZINE HYDROCHLORIDE SCH MG: 25 TABLET ORAL at 22:52

## 2019-10-08 RX ADMIN — ASPIRIN 81 MG SCH MG: 81 TABLET ORAL at 10:47

## 2019-10-08 RX ADMIN — INSULIN ASPART SCH UNITS: 100 INJECTION, SOLUTION INTRAVENOUS; SUBCUTANEOUS at 21:27

## 2019-10-08 RX ADMIN — INSULIN ASPART SCH UNITS: 100 INJECTION, SOLUTION INTRAVENOUS; SUBCUTANEOUS at 12:24

## 2019-10-08 NOTE — NUR
PT NOTE

Attempted to see patient for PT treatment. Patient declining to participate with PT at this 
time due to c/o nausea. Will re-attempt later as schedule permits. Myra TAYLOR notified.

## 2019-10-08 NOTE — INFECTIOUS DISEASES PROG NOTE
Assessment/Plan


Assessment/Plan








ASSESSMENT AND PLAN:





1. sepsis, ? pna, chf, leukocytosis,k elevated lactic acid


    clostridium perfringens blood culture - + 1 bottle, ? pathogen/bacteremia, 

? contaminant 





   - augmentin x 5 days more for 10 day tx course 


   - f/u chest x-ray better 


   - monitor labs, check abdominal us since has nausea 


   - clinically improved, leukocytosis persists 


   - surveillance blood cultures negative 





2. Acute kidney injury.


3. Anemia.


4. Diabetes.


5. Hypertension.


6. Blood sugar and blood pressure treatment for diabetes and


hypertension per primary.


7. History of peripheral vascular disease.


8. 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


9. Possible hyperlipidemia.


10. CHF.


11. PAD.


12. History of stent.


13. CAD.


14. Hypoxia.


15. Continue treatment per primary consultants.


16. No known allergies.


17. Social history is positive for smoking in the past, but quit.


18. Family history is positive for diabetes.


19. MAR was noted.


20. Case discussed with RN.


21. Skin care protocol.


22. ICU care.





Subjective


Constitutional:  Denies: fever


HEENT:  Denies: congestion


Respiratory:  Denies: shortness of breath


Cardiovascular:  Denies: palpitations


Gastrointestinal/Abdominal:  Reports: nausea; Denies: vomiting, diarrhea


Genitourinary:  Denies: dysuria, hematuria


Neurologic:  Denies: headache


Psychiatric:  Denies: depression


Skin:  Denies: rash


Hematologic:  Denies: bleeding


Musculoskeletal:  Denies: pain


Allergies:  


Coded Allergies:  


     No Known Allergies (Unverified , 6/2/16)





Objective


Vital Signs





Last 24 Hour Vital Signs








  Date Time  Temp Pulse Resp B/P (MAP) Pulse Ox O2 Delivery O2 Flow Rate FiO2


 


10/8/19 14:17    120/54    


 


10/8/19 12:00  90      


 


10/8/19 10:48    140/74    


 


10/8/19 10:47  89  140/74    


 


10/8/19 10:44    140/74    


 


10/8/19 10:41 98.4 89 18 140/74 (96) 96   


 


10/8/19 08:00 98.4 89 18 140/74 (96) 96   


 


10/8/19 08:00  88      


 


10/8/19 07:42     97 Nasal Cannula 3.0 32


 


10/8/19 07:42  86 20  97 Nasal Cannula 3.0 32


 


10/8/19 04:00  95      


 


10/8/19 04:00 98.6 94 18 139/75 (96) 96   


 


10/8/19 00:00  99      


 


10/8/19 00:00 99.0 97 18 142/70 (94) 97   


 


10/7/19 21:27  107  153/74    


 


10/7/19 21:00 98.7 107 20 153/74 (100) 95   


 


10/7/19 20:00      Room Air  


 


10/7/19 20:00 99.4 112 20 145/70 (95) 97   


 


10/7/19 19:09  112      


 


10/7/19 18:46     97 Nasal Cannula 3.0 32


 


10/7/19 18:45  103 22  99 Nasal Cannula 3.0 32


 


10/7/19 17:15    138/69    


 


10/7/19 16:00      Room Air  


 


10/7/19 16:00  109      


 


10/7/19 16:00 98.1 107 18 138/69 (92) 99   








Height (Feet):  5


Height (Inches):  6.00


Weight (Pounds):  167


General Appearance:  no acute distress


HEENT:  normocephalic, atraumatic, anicteric, mucous membranes moist


Respiratory/Chest:  lungs clear, normal breath sounds, no accessory muscle use


Cardiovascular:  normal rate, regular rhythm, no gallop/murmur, no JVD


Abdomen:  normal bowel sounds, soft, non tender, no organomegaly, non distended


Genitourinary:  other - + benjamin


Extremities:  no cyanosis


Skin:  no rash, no ulcers


Neurologic/Psychiatric:  alert, responsive


Lymphatic:  no neck adenopathy


Musculoskeletal:  no effusion


Objective








Chest x-ray - 10/6 - 





Upper abdomen: Normal.


 


IMPRESSION:   


  Decreased pulmonary edema.  Persistent bilateral pleural effusions with 


associated atelectasis versus pneumonia.  Probable mild pulmonary 


vasculature congestion.


 











Microbiology








 Date/Time


Source Procedure


Growth Status


 


 


 10/5/19 14:35


Blood Blood Culture - Preliminary


NO GROWTH AFTER 48 HOURS Resulted


 


 10/4/19 04:15


Nasal Nares MRSA Culture - Final


NO METHICILLIN RESISTANT STAPH AUREUS... Complete


 


 10/6/19 09:50


Straight Cath Urine Culture - Preliminary


NO GROWTH AFTER 24 HOURS Resulted


 


 10/4/19 04:15


Rectum - Final


NO CARBAPENEM-RESISTANT ENTEROBACTERI... Complete








Microbiology








 Date/Time


Source Procedure


Growth Status


 


 


 10/6/19 09:50


Straight Cath Urine Culture - Preliminary


NO GROWTH AFTER 24 HOURS Resulted











Laboratory Tests








Test


  10/8/19


05:32 10/8/19


10:20


 


White Blood Count


  14.2 K/UL


(4.8-10.8)  H 


 


 


Red Blood Count


  3.54 M/UL


(4.70-6.10)  L 


 


 


Hemoglobin


  10.2 G/DL


(14.2-18.0)  L 


 


 


Hematocrit


  30.4 %


(42.0-52.0)  L 


 


 


Mean Corpuscular Volume 86 FL (80-99)   


 


Mean Corpuscular Hemoglobin


  28.8 PG


(27.0-31.0) 


 


 


Mean Corpuscular Hemoglobin


Concent 33.5 G/DL


(32.0-36.0) 


 


 


Red Cell Distribution Width


  12.8 %


(11.6-14.8) 


 


 


Platelet Count


  274 K/UL


(150-450) 


 


 


Mean Platelet Volume


  7.0 FL


(6.5-10.1) 


 


 


Neutrophils (%) (Auto)


  75.9 %


(45.0-75.0)  H 


 


 


Lymphocytes (%) (Auto)


  13.8 %


(20.0-45.0)  L 


 


 


Monocytes (%) (Auto)


  8.7 %


(1.0-10.0) 


 


 


Eosinophils (%) (Auto)


  0.9 %


(0.0-3.0) 


 


 


Basophils (%) (Auto)


  0.7 %


(0.0-2.0) 


 


 


Sodium Level


  141 MMOL/L


(136-145) 


 


 


Potassium Level


  3.8 MMOL/L


(3.5-5.1) 


 


 


Chloride Level


  107 MMOL/L


() 


 


 


Carbon Dioxide Level


  24 MMOL/L


(21-32) 


 


 


Anion Gap


  10 mmol/L


(5-15) 


 


 


Blood Urea Nitrogen


  21 mg/dL


(7-18)  H 


 


 


Creatinine


  1.9 MG/DL


(0.55-1.30)  H 


 


 


Estimat Glomerular Filtration


Rate  mL/min (>60)  


  


 


 


Glucose Level


  196 MG/DL


()  #H 


 


 


Calcium Level


  8.4 MG/DL


(8.5-10.1)  L 


 


 


Phosphorus Level


  3.5 MG/DL


(2.5-4.9) 


 


 


Magnesium Level


  1.9 MG/DL


(1.8-2.4) 


 


 


Random Vancomycin Level 10.8 ug/mL   


 


Digoxin Level


  1.3 NG/ML


(0.5-2.0) 


 


 


Total Creatine Kinase


  


  28 U/L


()


 


Creatine Kinase MB


  


  1.2 NG/ML


(0.0-3.6)


 


Creatine Kinase MB Relative


Index 


  4.2  


 


 


Troponin I


  


  0.065 ng/mL


(0.000-0.056)











Current Medications








 Medications


  (Trade)  Dose


 Ordered  Sig/Sherly


 Route


 PRN Reason  Start Time


 Stop Time Status Last Admin


Dose Admin


 


 Acetaminophen


  (Tylenol)  500 mg  Q6H  PRN


 ORAL


 PAIN 1-6  10/7/19 22:15


 11/5/19 10:14   


 


 


 Al Hydroxide/Mg


 Hydroxide


  (Mylanta)  30 ml  Q6H  PRN


 ORAL


 nausea/acid reflux  10/8/19 12:00


 11/7/19 11:59   


 


 


 Amiodarone HCl


  (Cordarone)  400 mg  EVERY 12  HOURS


 ORAL


   10/7/19 21:00


 11/6/19 20:59  10/8/19 10:46


 


 


 Apixaban


  (Eliquis)  2.5 mg  BID


 ORAL


   10/8/19 09:00


 11/4/19 17:59  10/8/19 10:46


 


 


 Aspirin


  (ASA)  81 mg  DAILY


 ORAL


   10/8/19 09:00


 11/3/19 11:14  10/8/19 10:47


 


 


 Atorvastatin


 Calcium


  (Lipitor)  20 mg  BEDTIME


 ORAL


   10/7/19 21:00


 11/3/19 20:59  10/7/19 21:27


 


 


 Carvedilol


  (Coreg)  25 mg  EVERY 12  HOURS


 ORAL


   10/7/19 21:00


 11/6/19 20:59  10/8/19 10:47


 


 


 Cefepime HCl 2 gm/


 Dextrose  55 ml @ 


 110 mls/hr  Q24H


 IVPB


   10/8/19 09:00


 10/12/19 08:59  10/8/19 09:00


 


 


 Clopidogrel


 Bisulfate


  (Plavix)  75 mg  DAILY


 ORAL


   10/8/19 09:00


 11/3/19 11:14  10/8/19 10:47


 


 


 Dextrose


  (Dextrose 50%)  25 ml  Q30M  PRN


 IV


 Hypoglycemia  10/7/19 21:15


 11/3/19 11:14   


 


 


 Dextrose


  (Dextrose 50%)  50 ml  Q30M  PRN


 IV


 Hypoglycemia  10/7/19 21:15


 11/3/19 11:14   


 


 


 Docusate Sodium


  (Colace)  100 mg  TWICE A  DAY


 ORAL


   10/8/19 18:00


 11/7/19 17:59   


 


 


 Finasteride


  (Proscar)  5 mg  DAILY


 ORAL


   10/8/19 09:00


 11/7/19 08:59  10/8/19 10:47


 


 


 Hydralazine HCl


  (Apresoline)  25 mg  Q8HR


 ORAL


   10/8/19 14:00


 11/7/19 13:59  10/8/19 14:17


 


 


 Insulin Aspart


  (NovoLOG)    AC+HS


 SUBQ


   10/7/19 22:30


 11/3/19 22:29  10/8/19 12:24


 


 


 Insulin Detemir


  (Levemir)  15 units  BEDTIME


 SUBQ


   10/7/19 22:30


 11/3/19 22:29  10/7/19 23:30


 


 


 Insulin Detemir


  (Levemir)  25 units  DAILY


 SUBQ


   10/8/19 09:00


 11/3/19 11:14  10/8/19 11:26


 


 


 Isosorbide


 Mononitrate


  (Imdur)  30 mg  DAILY


 ORAL


   10/8/19 09:00


 11/7/19 08:59  10/8/19 10:44


 


 


 Metronidazole  100 ml @ 


 100 mls/hr  Q8HR


 IVPB


   10/7/19 22:00


 10/11/19 19:59  10/8/19 14:18


 


 


 Ondansetron HCl


  (Zofran ODT)  4 mg  Q6H  PRN


 ORAL


 Nausea & Vomiting  10/8/19 10:15


 11/7/19 10:14   


 


 


 Pantoprazole


  (Protonix)  40 mg  EVERY 12  HOURS


 IVP


   10/8/19 21:00


 11/7/19 20:59   


 


 


 Tamsulosin HCl


  (Flomax)  0.4 mg  BID


 ORAL


   10/8/19 18:00


 11/5/19 20:59   


 


 


 Tramadol HCl


  (Ultram)  50 mg  Q6H  PRN


 ORAL


 Severe Pain (Pain Scale 7-10)  10/7/19 22:15


 10/13/19 10:14   


 


 


 Vancomycin HCl


  (Vanco rx to


 dose)  1 ea  DAILY  PRN


 MISC


 Per rx protocol  10/7/19 21:15


 11/6/19 21:14   


 


 


 Vancomycin HCl 1


 gm/Sodium Chloride  275 ml @ 


 275 mls/hr  0930


 IVPB


   10/8/19 09:30


 10/13/19 09:29  10/8/19 12:10


 

















Eve Milan MD Oct 8, 2019 16:06

## 2019-10-08 NOTE — PULMONOLOGY PROGRESS NOTE
Assessment/Plan


Problems:  


(1) Atrial fibrillation with rapid ventricular response


(2) Acute exacerbation of CHF (congestive heart failure)


(3) Acute kidney injury superimposed on CKD


(4) Gram positive bacterial infection


(5) DM (diabetes mellitus)


(6) HTN (hypertension)


(7) Hypoxemia


Assessment/Plan


Optimize pulmonary hygiene/mobilize as tolerated


Titrate down FiO2 to keep SaO2 > 90%


Continue Cefepime/Vanco/Flagyl (D5) per ID


F/U CARDS/EPS recs ---> PO MTP, Eliquis, diuresis ---> plan to transfer for cath


Monitor volumes and renal function, renal recs noted, diuretics held


Glycemic control


Aspiration precautions


DVT Px: Eliquis


FC





Subjective


Allergies:  


Coded Allergies:  


     No Known Allergies (Unverified , 6/2/16)


Subjective


AFVSS periods of AF


O2 needs stable


no cough less SOB no CP no FC


VRPM @ [facility] neg, PCt 1.76





Objective





Last 24 Hour Vital Signs








  Date Time  Temp Pulse Resp B/P (MAP) Pulse Ox O2 Delivery O2 Flow Rate FiO2


 


10/8/19 10:48    140/74    


 


10/8/19 10:47  89  140/74    


 


10/8/19 10:44    140/74    


 


10/8/19 10:41 98.4 89 18 140/74 (96) 96   


 


10/8/19 08:00 98.4 89 18 140/74 (96) 96   


 


10/8/19 07:42     97 Nasal Cannula 3.0 32


 


10/8/19 07:42  86 20  97 Nasal Cannula 3.0 32


 


10/8/19 04:00  95      


 


10/8/19 04:00 98.6 94 18 139/75 (96) 96   


 


10/8/19 00:00  99      


 


10/8/19 00:00 99.0 97 18 142/70 (94) 97   


 


10/7/19 21:27  107  153/74    


 


10/7/19 21:00 98.7 107 20 153/74 (100) 95   


 


10/7/19 20:00      Room Air  


 


10/7/19 20:00 99.4 112 20 145/70 (95) 97   


 


10/7/19 19:09  112      


 


10/7/19 18:46     97 Nasal Cannula 3.0 32


 


10/7/19 18:45  103 22  99 Nasal Cannula 3.0 32


 


10/7/19 17:15    138/69    


 


10/7/19 16:00      Room Air  


 


10/7/19 16:00  109      


 


10/7/19 16:00 98.1 107 18 138/69 (92) 99   

















Intake and Output  


 


 10/7/19 10/8/19





 19:00 07:00


 


Intake Total 395 ml 


 


Output Total 1950 ml 700 ml


 


Balance -1555 ml -700 ml


 


  


 


Intake Oral 240 ml 


 


IV Total 155 ml 


 


Output Urine Total 1950 ml 700 ml








General Appearance:  no acute distress, cachetic


HEENT:  normocephalic, atraumatic, anicteric, mucous membranes moist


Respiratory/Chest:  crackles/rales


Cardiovascular:  normal peripheral pulses, normal rate, regular rhythm


Abdomen:  normal bowel sounds, soft, non tender, no organomegaly, non distended

, no mass


Extremities:  no cyanosis, no clubbing, no edema





Microbiology








 Date/Time


Source Procedure


Growth Status


 


 


 10/5/19 14:35


Blood Blood Culture - Preliminary


NO GROWTH AFTER 48 HOURS Resulted


 


 10/5/19 14:20


Blood Blood Culture - Preliminary


NO GROWTH AFTER 48 HOURS Resulted


 


 10/6/19 09:50


Straight Cath Urine Culture - Preliminary


NO GROWTH AFTER 24 HOURS Resulted








Laboratory Tests


10/8/19 05:32: 


White Blood Count 14.2H, Red Blood Count 3.54L, Hemoglobin 10.2L, Hematocrit 

30.4L, Mean Corpuscular Volume 86, Mean Corpuscular Hemoglobin 28.8, Mean 

Corpuscular Hemoglobin Concent 33.5, Red Cell Distribution Width 12.8, Platelet 

Count 274, Mean Platelet Volume 7.0, Neutrophils (%) (Auto) 75.9H, Lymphocytes (

%) (Auto) 13.8L, Monocytes (%) (Auto) 8.7, Eosinophils (%) (Auto) 0.9, 

Basophils (%) (Auto) 0.7, Sodium Level 141, Potassium Level 3.8, Chloride Level 

107, Carbon Dioxide Level 24, Anion Gap 10, Blood Urea Nitrogen 21H, Creatinine 

1.9H, Estimat Glomerular Filtration Rate , Glucose Level 196#H, Calcium Level 

8.4L, Phosphorus Level 3.5, Magnesium Level 1.9, Random Vancomycin Level 10.8, 

Digoxin Level 1.3


10/8/19 10:20: 


Total Creatine Kinase 28, Creatine Kinase MB 1.2, Creatine Kinase MB Relative 

Index 4.2, Troponin I 0.065H





Current Medications








 Medications


  (Trade)  Dose


 Ordered  Sig/Sherly


 Route


 PRN Reason  Start Time


 Stop Time Status Last Admin


Dose Admin


 


 Acetaminophen


  (Tylenol)  500 mg  Q6H  PRN


 ORAL


 PAIN 1-6  10/7/19 22:15


 11/5/19 10:14   


 


 


 Al Hydroxide/Mg


 Hydroxide


  (Mylanta)  30 ml  Q6H  PRN


 ORAL


 nausea/acid reflux  10/8/19 12:00


 11/7/19 11:59   


 


 


 Amiodarone HCl


  (Cordarone)  400 mg  EVERY 12  HOURS


 ORAL


   10/7/19 21:00


 11/6/19 20:59  10/8/19 10:46


 


 


 Apixaban


  (Eliquis)  2.5 mg  BID


 ORAL


   10/8/19 09:00


 11/4/19 17:59  10/8/19 10:46


 


 


 Aspirin


  (ASA)  81 mg  DAILY


 ORAL


   10/8/19 09:00


 11/3/19 11:14  10/8/19 10:47


 


 


 Atorvastatin


 Calcium


  (Lipitor)  20 mg  BEDTIME


 ORAL


   10/7/19 21:00


 11/3/19 20:59  10/7/19 21:27


 


 


 Carvedilol


  (Coreg)  25 mg  EVERY 12  HOURS


 ORAL


   10/7/19 21:00


 11/6/19 20:59  10/8/19 10:47


 


 


 Cefepime HCl 2 gm/


 Dextrose  55 ml @ 


 110 mls/hr  Q24H


 IVPB


   10/8/19 09:00


 10/12/19 08:59  10/8/19 09:00


 


 


 Clopidogrel


 Bisulfate


  (Plavix)  75 mg  DAILY


 ORAL


   10/8/19 09:00


 11/3/19 11:14  10/8/19 10:47


 


 


 Dextrose


  (Dextrose 50%)  25 ml  Q30M  PRN


 IV


 Hypoglycemia  10/7/19 21:15


 11/3/19 11:14   


 


 


 Dextrose


  (Dextrose 50%)  50 ml  Q30M  PRN


 IV


 Hypoglycemia  10/7/19 21:15


 11/3/19 11:14   


 


 


 Docusate Sodium


  (Colace)  100 mg  TWICE A  DAY


 ORAL


   10/8/19 18:00


 11/7/19 17:59   


 


 


 Finasteride


  (Proscar)  5 mg  DAILY


 ORAL


   10/8/19 09:00


 11/7/19 08:59  10/8/19 10:47


 


 


 Hydralazine HCl


  (Apresoline)  25 mg  Q8HR


 ORAL


   10/8/19 14:00


 11/7/19 13:59   


 


 


 Insulin Aspart


  (NovoLOG)    AC+HS


 SUBQ


   10/7/19 22:30


 11/3/19 22:29  10/8/19 12:24


 


 


 Insulin Detemir


  (Levemir)  15 units  BEDTIME


 SUBQ


   10/7/19 22:30


 11/3/19 22:29  10/7/19 23:30


 


 


 Insulin Detemir


  (Levemir)  25 units  DAILY


 SUBQ


   10/8/19 09:00


 11/3/19 11:14  10/8/19 11:26


 


 


 Isosorbide


 Mononitrate


  (Imdur)  30 mg  DAILY


 ORAL


   10/8/19 09:00


 11/7/19 08:59  10/8/19 10:44


 


 


 Metronidazole  100 ml @ 


 100 mls/hr  Q8HR


 IVPB


   10/7/19 22:00


 10/11/19 19:59  10/8/19 05:34


 


 


 Ondansetron HCl


  (Zofran ODT)  4 mg  Q6H  PRN


 ORAL


 Nausea & Vomiting  10/8/19 10:15


 11/7/19 10:14   


 


 


 Pantoprazole


  (Protonix)  40 mg  EVERY 12  HOURS


 IVP


   10/8/19 21:00


 11/7/19 20:59   


 


 


 Tamsulosin HCl


  (Flomax)  0.4 mg  BID


 ORAL


   10/8/19 18:00


 11/5/19 20:59   


 


 


 Tramadol HCl


  (Ultram)  50 mg  Q6H  PRN


 ORAL


 Severe Pain (Pain Scale 7-10)  10/7/19 22:15


 10/13/19 10:14   


 


 


 Vancomycin HCl


  (Vanco rx to


 dose)  1 ea  DAILY  PRN


 MISC


 Per rx protocol  10/7/19 21:15


 11/6/19 21:14   


 


 


 Vancomycin HCl 1


 gm/Sodium Chloride  275 ml @ 


 275 mls/hr  0930


 IVPB


   10/8/19 09:30


 10/13/19 09:29  10/8/19 12:10


 

















Ace Fraire MD Oct 8, 2019 13:16

## 2019-10-08 NOTE — GENERAL PROGRESS NOTE
Assessment/Plan


Problem List:  


(1) HTN (hypertension)


ICD Codes:  I10 - Essential (primary) hypertension


SNOMED:  80262176


(2) DM (diabetes mellitus)


ICD Codes:  E11.9 - Type 2 diabetes mellitus without complications


SNOMED:  62623984


(3) Acute exacerbation of CHF (congestive heart failure)


ICD Codes:  I50.9 - Heart failure, unspecified


SNOMED:  654460619, 12368463822213


Qualifiers:  


   Qualified Codes:  I50.9 - Heart failure, unspecified


(4) Atrial fibrillation with rapid ventricular response


ICD Codes:  I48.91 - Unspecified atrial fibrillation


SNOMED:  280760578389828


(5) Hypoxemia


ICD Codes:  R09.02 - Hypoxemia


SNOMED:  873319629


(6) MERLYN (acute kidney injury)


ICD Codes:  N17.9 - Acute kidney failure, unspecified


SNOMED:  80485941, 7683078


(7) Cellulitis in diabetic foot


ICD Codes:  E11.628 - Type 2 diabetes mellitus with other skin complications; 

L03.119 - Cellulitis of unspecified part of limb


SNOMED:  809374674, 06884812


(8) Sepsis


ICD Codes:  A41.9 - Sepsis, unspecified organism


SNOMED:  28314186, 34689459


Qualifiers:  


   Qualified Codes:  A41.9 - Sepsis, unspecified organism; R65.20 - Severe 

sepsis without septic shock; J96.01 - Acute respiratory failure with hypoxia


Status:  stable


Assessment/Plan:


This is a 74 year old male with a PMHx Dm2, HTN, CHF, PAD s/p left iliac stent 

placement 10/25/19 and subsequent left 2nd toe amputation for gangrene/

osteomyelitis, CAD who presents with acute hypoxic respiratory failure 2/2 CHF 

exacerbation and sepsis. 





#acute hypoxic respiratory failure 2/2 systolic CHF exacerbation (new diagnosis

) and possible pneumonia- no known history of CHF. Also treating for possible 

pneumonia - IMPROVING


- telemetry


- titrate SpO2 to keep O2 >92%


- f/u blood cultures. 1/4 sets clostridium perfingens. Likely contaminant per 

ID. Surveilance cultures negative to date


- f/u sputum culture


- Vancomycin, Cefepime and Flagyl Day 5


- Continue lasix 40mg IV BID per discussion with cardiology


- Continue afterload reduction with hydralazine and Imdur per cardiology and 

nephrology


- ACE-I contraindicated in setting of acute renal failure


- Per cardiology given low EF will need ischemia workup. Per cardiology, an 

extensive discussion was performed at bedside using a  area, 

to discuss the need for an ischemia workup including a cardiac angiogram.  The 

patient declined any further cardiac intervention.  Per cardiology, the patient 

will need to be optimized medically.  Anticipate discharge in the next 1 to 2 

days given that patient has been started on many different new medications. 


- appreciate Cardiology recommendations: Dr. Malave


- Appreciate Infectious Disease recommendations: Dr. Bergman





#Atrial fibrillation with RVR, new diagnosis. - IMPROVINg, althought still with 

episodes of tachycardia


CHADS-VASC 5 - risk of stroke 7.2% per year


HASBLED - 3 - risk of bleeding 5.8% per year


> Explained to patient risks and benefits of anticoagulation with Asa/Plavix 

and an anticoagulant. Patient understands the risks of stroke are > than risk 

of bleeding and would like to take the anticoagulant. Will start patient on 

eliquis renal dosing 2.5mg PO BID


- s/p Diltiazem Gtt


- Coreg and digoxin for heart rate controlled


- appreicate cardiology recommendations





#sepsis (WBC, heart rate, respiratory rate) 2/2 pneumonia - IMPROVING


#Clostridium perfingens in 1/2 blood culture- likely contaminant per ID


> C diff negative


> ESR 74, CRP 1.4, unlikely osteomyelitis


- cultures as above


- Repeat blood cultures: negative so far


- antibiotics as above





#acute urinary retention, new problem


> UA and urine culture negative


- Inserted benjamin on 10/6/10


- Tamsulosin 0.4mg PO daily


- Finasteride


- Appreciate urology recommendations: Dr. Jiang.


- Per urology continue benjamin on discharge. Follow up as outpatient





#Acute on Chronic Renal failure 2/2 CHF exacerbation and urinary obstruction - 

Stabilizing. Not back to baseline yet


> Baseline Cr 1.5


> Renal ultrasound negative


- IV diuresis as above


- continue to trend 


- avoid nephrotoxins


- replace lytes


- Appreciate nephrology consultation: Dr. Shah





#Hypertension


#hyperlipidemia


#CAD


-hold home amlodipine


- continue Coreg as above


- ASA/Plavix as above





#hypokalemia


#hypomagnesemia


- replete


- continue to monitor with labs





 #lactic acidosis suspected 2/2 CHF exacerbation vs. sepsis - RESOLVED


- trended down to 1


- management as above





#S/p left 2nd toe amputation due to gangrene/osteomyelitis


#s/p recent left iliac stent on ASA/Plavix 10 days ago.


> ESR 73, CRP 1.4


- wound consult


- request records from [facility]





#Diabetes mellitus type 1 per patient (suspect this is an error and has type 2) 

with hyperglycemia-  improved


> Hemoglobin A1C 8.6 on 10/6/10


- accuchecks Q6hr


- Diabetic diet


- Levemir 25 units QAM and 15 units QPM (home is 25 and 30)


- SSI, average





FENPPx


DVT PPx: eliquis


GI PPX: pepcid


Fluids: none


Diet: Diabetic diet


Lines: none


PT/OT: pending





Dispo: SNF vs. Home. Pending PT evaluation. 





D/w RN, patient, and Cardiology. I spent 37 minutes on this encounter. >50% 

spent on counseling and care coordination. 





Time of note may not reflect time patient was seen





Subjective


Date patient seen:  Oct 8, 2019


Constitutional:  Denies: chills, diaphoresis, fever, malaise, weakness, other


HEENT:  Denies: eye pain, blurred vision, tearing, double vision, ear pain, ear 

discharge, nose pain, nose congestion, throat pain, throat swelling, mouth pain

, mouth swelling, other


Cardiovascular:  Denies: chest pain, edema, irregular heart rate, 

lightheadedness, palpitations, syncope, other


Respiratory:  Denies: cough, orthopnea, shortness of breath, SOB with excertion

, SOB at rest, sputum, stridor, wheezing, other


Gastrointestinal/Abdominal:  Denies: abdomen distended, abdominal pain, black 

stools, tarry stools, blood in stool, constipated, diarrhea, difficulty 

swallowing, nausea, poor appetite, poor fluid intake, rectal bleeding, vomiting

, other


Genitourinary:  Denies: burning, discharge, frequency, flank pain, hematuria, 

incontinence, pain, urgency, other


Neurologic/Psychiatric:  Denies: anxiety, depressed, emotional problems, 

headache, numbness, paresthesia, pre-existing deficit, seizure, tingling, 

tremors, weakness, other


Endocrine:  Denies: excessive sweating, flushing, intolerance to cold, 

intolerance to heat, increased hunger, increased thirst, increased urine, 

unexplained weight gain, unexplained weight loss, other


Hematologic/Lymphatic:  Denies: anemia, easy bleeding, easy bruising, other


Allergies:  


Coded Allergies:  


     No Known Allergies (Unverified , 6/2/16)


Subjective


No acute events overnight per nursing. This morning the patient experienced 

some nausea but no vomiting or abdominal pain.  He denied any chest pain 

shortness of breath fever or chills.  He did still have a dry cough.  He denied 

any melena or hematochezia.  An EKG and cardiac troponin was drawn..





Afib: Heart rate is better controlled the patient is tolerating the medications 

well.  He denies any palpitations or syncope.


Pneumonia/CHF: The patient is on room air.  He endorses that his breathing is 

well today.  He has lost a total 7 kg While inpatient





Objective





Last 24 Hour Vital Signs








  Date Time  Temp Pulse Resp B/P (MAP) Pulse Ox O2 Delivery O2 Flow Rate FiO2


 


10/8/19 10:48    140/74    


 


10/8/19 10:47  89  140/74    


 


10/8/19 10:44    140/74    


 


10/8/19 10:41 98.4 89 18 140/74 (96) 96   


 


10/8/19 08:00 98.4 89 18 140/74 (96) 96   


 


10/8/19 07:42     97 Nasal Cannula 3.0 32


 


10/8/19 07:42  86 20  97 Nasal Cannula 3.0 32


 


10/8/19 04:00  95      


 


10/8/19 04:00 98.6 94 18 139/75 (96) 96   


 


10/8/19 00:00  99      


 


10/8/19 00:00 99.0 97 18 142/70 (94) 97   


 


10/7/19 21:27  107  153/74    


 


10/7/19 21:00 98.7 107 20 153/74 (100) 95   


 


10/7/19 20:00      Room Air  


 


10/7/19 20:00 99.4 112 20 145/70 (95) 97   


 


10/7/19 19:09  112      


 


10/7/19 18:46     97 Nasal Cannula 3.0 32


 


10/7/19 18:45  103 22  99 Nasal Cannula 3.0 32


 


10/7/19 17:15    138/69    


 


10/7/19 16:00      Room Air  


 


10/7/19 16:00  109      


 


10/7/19 16:00 98.1 107 18 138/69 (92) 99   

















Intake and Output  


 


 10/7/19 10/8/19





 19:00 07:00


 


Intake Total 395 ml 


 


Output Total 1950 ml 700 ml


 


Balance -1555 ml -700 ml


 


  


 


Intake Oral 240 ml 


 


IV Total 155 ml 


 


Output Urine Total 1950 ml 700 ml








Laboratory Tests


10/8/19 05:32: 


White Blood Count 14.2H, Red Blood Count 3.54L, Hemoglobin 10.2L, Hematocrit 

30.4L, Mean Corpuscular Volume 86, Mean Corpuscular Hemoglobin 28.8, Mean 

Corpuscular Hemoglobin Concent 33.5, Red Cell Distribution Width 12.8, Platelet 

Count 274, Mean Platelet Volume 7.0, Neutrophils (%) (Auto) 75.9H, Lymphocytes (

%) (Auto) 13.8L, Monocytes (%) (Auto) 8.7, Eosinophils (%) (Auto) 0.9, 

Basophils (%) (Auto) 0.7, Sodium Level 141, Potassium Level 3.8, Chloride Level 

107, Carbon Dioxide Level 24, Anion Gap 10, Blood Urea Nitrogen 21H, Creatinine 

1.9H, Estimat Glomerular Filtration Rate , Glucose Level 196#H, Calcium Level 

8.4L, Phosphorus Level 3.5, Magnesium Level 1.9, Random Vancomycin Level 10.8, 

Digoxin Level 1.3


10/8/19 10:20: 


Total Creatine Kinase 28, Creatine Kinase MB 1.2, Creatine Kinase MB Relative 

Index 4.2, Troponin I 0.065H


Height (Feet):  5


Height (Inches):  6.00


Weight (Pounds):  167


General Appearance:  WD/WN, no apparent distress, alert


EENT:  PERRL/EOMI, normal ENT inspection, TMs normal


Neck:  non-tender, normal alignment, supple


Cardiovascular:  normal peripheral pulses, normal rate, other - irregular 

irregular, no murmrus rubs or gallops


Respiratory/Chest:  chest wall non-tender, lungs clear, normal breath sounds, 

no respiratory distress


Abdomen:  normal bowel sounds, non tender, soft, no organomegaly


Edema:  trace edema


Neurologic:  CNs II-XII grossly normal, no motor/sensory deficits, alert, 

oriented x 3


Skin:  normal pigmentation, warm/dry











Reynaldo Schaffer D.O. Oct 8, 2019 13:31

## 2019-10-08 NOTE — CONSULTATION
DATE OF CONSULTATION:  10/07/2019

UROLOGY CONSULTATION



CONSULTING PHYSICIAN:  Apolinar Fraser M.D.



REFERRING PHYSICIAN:  Reynaldo Schaffer D.O.



REASON FOR CONSULTATION:  For evaluation of urinary retention.



HISTORY OF PRESENT ILLNESS:  This is a 74-year-old male.  He was admitted

to the hospital because of hypoxic respiratory failure.  He has CHF.  He

was noted to be in a mild renal failure.  He had urinary retention.  An

was placed.  Greater than two liters of urine came out.  Apparently, he

has a history of BPH.  Urology evaluation is requested.



PAST MEDICAL HISTORY:  Noted in the chart and again as above, history of

CHF, hypertension, diabetes, and peripheral vascular disease.



PAST SURGICAL HISTORY:  Left toe amputation, cholecystectomy, and iliac

stent placement.



MEDICATIONS:  Medication list in the hospital was reviewed.



ALLERGIES:  No known drug allergies.



SOCIAL HISTORY:  Currently nonsmoker.



PHYSICAL EXAMINATION:

GENERAL:  No acute distress.

VITAL SIGNS:  Temperature is 98.1 and blood pressure 130/69.

ABDOMEN:  Soft.  No CVA tenderness.  An in place.  Urine is

christen.



LABORATORY DATA:  UA showed 20 to 30 rbc's.  White count 10.9, hemoglobin

10.5.  BUN is 20, creatinine 1.9.



DIAGNOSTIC IMAGING STUDIES:  The patient had a renal ultrasound.  There was

mention of bilateral renal cyst.  No hydronephrosis.  Questionable

nonobstructing left renal calculus.



IMPRESSION:

1. Urinary retention.

2. Benign prostatic hypertrophy.

3. Probable neurogenic bladder.

4. Renal insufficiency, acute versus chronic.

5. Renal cyst.

6. Rule out nephrolithiasis.

7. Hematuria.



PLAN AND DISCUSSION:  Again, the An has been placed, indwelling.  The

patient has significant residual urine.  He has had some renal

insufficiency, probably has bladder outlet obstruction and atonic bladder.

At this time, I discussed the case with Dr. Schaffer, I would agree

to leave the An catheter indwelling and Flomax has already been

started.  I will also add finasteride 5 mg daily.  He can be discharged

with indwelling An and follow up with his primary doctor and urologist

in his plan.  Once his renal function has stabilized, he can have a

voiding trial at that time.



Thank you for this consultation.









  ______________________________________________

  Apolinar Fraser M.D.





DR:  ADA

D:  10/07/2019 19:59

T:  10/08/2019 01:16

JOB#:  [id number]

CC:

## 2019-10-08 NOTE — NUR
*-* INSURANCE *-*



ALL CLINICALS AND REVIEWS HAVE BEEN FAXED TO:



[facility]

TRK# [id number]

F: [phone]

CARE COORDINATOR: JP CAMACHO

P: [phone]



&



[facility]

NCM: CHLOÉ

P: [phone]

F: [phone]

## 2019-10-08 NOTE — NUR
RD ASSESSMENT & RECOMMENDATIONS

SEE CARE ACTIVITY FOR COMPLETE ASSESSMENT



DAILY ESTIMATED NEEDS:

Needs based on DM, 67kg adj  

25-30  kcals/kg 

1675-2010  total kcals

1-1.5  g protein/kg

  g total protein 

25-30  mL/kg

1675-2010  total fluid mLs



NUTRITION DIAGNOSIS:

Altered nutrition related lab values r/t diabetes as evidenced by A1C

8.4, POC elevated (186-341)





PO DIET RECOMMENDATIONS:

CCHO LOW/ LOW NA  





ADDITIONAL RECOMMENDATIONS:

1) CHF dx, rec daily standing weights 

2) B-complex daily  

3) Diet edu as appropriate 

4) Add high pro/ 1 carb snacks in b/w meals

## 2019-10-08 NOTE — NUR
NURSE NOTES:

Received patient from BRANDON Renteria. Patient alert, talkative, and resting comfortably in bed. 
No signs of distress or pain noted. Patient able to make needs known. IV sites checked, 
intact and patent. Bed in lowest position, brakes on, bed alarm on, siderails up x3, and 
call light within reach. Will continue with plan of care.

## 2019-10-08 NOTE — CARDIOLOGY REPORT
--------------- APPROVED REPORT --------------





EKG Measurement

Heart [id number]

[id number]

[id number]

[id number]





Atrial fibrillation with rapid ventricular response

Abnormal ECG

## 2019-10-08 NOTE — NUR
Lanette Mosqueda is a 41 year old female presenting with   Chief Complaint   Patient presents with   • Follow-up     multipul arthropathies        PAIN: How much pain have you had because of your arthritis/disease in the past week?    NO PAIN [] [] [] [] [] [x] [] [] [] [] [] SEVERE PAIN    0 1 2 3 4 5 6 7 8 9 10      DISEASE ACTIVITY:  Considering all the ways your arthritis/disease affects you?  VERY WELL [] [] [] [] [x] [] [] [] [] [] [] VERY POORLY    0 1 2 3 4 5 6 7 8 9 10      FATIGUE:  How much of a problem has unusual fatigue or tiredness been for you in the past week?    NO PROBLEM [] [] [] [] [] [] [x] [] [] [] [] MAJOR PROBLEM    0 1 2 3 4 5 6 7 8 9 10      Visit Vitals  /84   Pulse 67   Temp 98.7 °F (37.1 °C) (Tympanic)   Ht 5' 4\" (1.626 m)   Wt 124.8 kg   BMI 47.23 kg/m²       Medications have been reviewed and updated    Denies known Latex allergy or symptoms of Latex sensitivity.  Tobacco use verified.    Health Maintenance Due   Topic Date Due   • DTaP/Tdap/Td Vaccine (1 - Tdap) 11/10/1998   • Cervical Cancer Screening HPV CO-Testing  11/10/2009       Patient would like communication of their results via:     Cell Phone:   Telephone Information:   Mobile [phone]     Okay to leave a message containing results? Yes         NURSE NOTES:

pt is now agreeing to have heart catheterization done, notified doctor Beny.

## 2019-10-08 NOTE — NUR
NURSE NOTES:

mediication given late pt very nauseas and wanted meds later.  Also scanner was not working.

## 2019-10-08 NOTE — NUR
SI;    CHF, RAPID AFIB

T. 98.4 HR 89 RR 18 B/P 140/79

WBC 14.2 BUN 21 CR 1.9 TROP 0.065

RENAL US+ NEGATIVE FOR HYDRONEPHROSIS





IS:   VANCO IV

CEFEPIME IV

FLAGYL IV

PROTONIX IV

*******TELE STATUS******

## 2019-10-08 NOTE — NUR
NURSE NOTES:

pt alert and oriented.  Pt will be having breakfast after meds he feels nauseas.  Pt on 
cardiac monitor no signs cardiac or respiratory distress at this time.  Calll light within 
reach. Bed is locked and in lowest position Will continuer to monitor

## 2019-10-08 NOTE — NUR
NURSE NOTES:

pt does not want to get heart catheterization, he wants to go home "what ever happens with 
me I want it to happen at home I'm tired of being here".  Doctor Frieda explained to pt the 
reason of the procedure and that pt need to have it.  Doct. is aware of pt's doesn't want 
procedure done.

## 2019-10-08 NOTE — NUR
NURSE NOTES:

per Dr. Alexandra, find out from C/M if [facility] is able to provide IV antibiotic admin. if 
she pt may be DC today.  If not pt will be staying until antibiotic therapy ends.

-------------------------------------------------------------------------------

Addendum: 10/08/19 at 0838 by Myra Multani RN

-------------------------------------------------------------------------------

per Dr. Alexandra, find out from C/M if [facility] is able to provide IV antibiotic admin. if 
[facility] is able to give IV meds pt may be DC today.  If not pt will be staying until antibiotic 
therapy ends.  Unable to leave voice mail at Ext [phone] ph keeps gabrielle will try to call 
again later.

-------------------------------------------------------------------------------

Addendum: 10/08/19 at 0839 by Myra Multani RN

-------------------------------------------------------------------------------

Previous 2notes on IV antibiotics at [facility] are for the wrong pt. pls disregard

## 2019-10-08 NOTE — CONSULTATION
Consult Note


Consult Note





asked to eval at the request of Dr Hall's group





patient has elevated serum Cr


interviewed as possible- language bareer


examined


data reviewed


hallmarks:


Low Ej Fx and global cardiac hypokinesis


Kidney MULU : left stone- no obstruction


CXR : Bilateral pleural effusion


Patient has benjamin cath


meds reviewed


Assessment/Plan





Renal failure- acute on chronic


CardioMyopathy and low EjFx - CHF


Anemia


HTN


DM / Nephropathy


Elevated troponin I


At Fib


BPH








Plan:


Adjust BP meds-


Aim to reduce afterload


avoid nephrotoxics


slow diuresis


antibiotics


gastric support


urine studies


per orders























James Feliz MD Oct 8, 2019 12:06

## 2019-10-08 NOTE — CARDIAC ELECTROPHYSIOLOGY PN
Assessment/Plan


Assessment/Plan


1. CHF exacerbation. Echo EF 30-35%. Continue Lasix 40 iv bid, Hydralazine 10 

bid, Imdur 30 daily and Aldactone 25 daily.


   Needs transfer for cardiac cath. Will get prior Echo. If EF less than 35% 

for 3 months will need ICD implant





2. Atrial fib with RVR 160s. On Amiodarone 400 bid, Dig 0.125 daily and Coreg 

25 bid. Hold Eliquis for cardiac cath


3. Uncontrolled diabetes, on insulin.


4. Severe peripheral vascular disease, status post left second toe gangrene 

amputation for osteomyelitis followed by Podiatry and Vascular


Surgery.On Aspirin and Plavix





ERIKA RN and Dr Gallagher


Transfer to [facility] [place] for cardiac cath





Subjective


Subjective


Remained in SR overnight. No CP. EF 30-35%. Now on CHF meds





Objective





Last 24 Hour Vital Signs








  Date Time  Temp Pulse Resp B/P (MAP) Pulse Ox O2 Delivery O2 Flow Rate FiO2


 


10/8/19 07:42     97 Nasal Cannula 3.0 32


 


10/8/19 07:42  86 20  97 Nasal Cannula 3.0 32


 


10/8/19 04:00  95      


 


10/8/19 04:00 98.6 94 18 139/75 (96) 96   


 


10/8/19 00:00  99      


 


10/8/19 00:00 99.0 97 18 142/70 (94) 97   


 


10/7/19 21:27  107  153/74    


 


10/7/19 21:00 98.7 107 20 153/74 (100) 95   


 


10/7/19 20:00      Room Air  


 


10/7/19 20:00 99.4 112 20 145/70 (95) 97   


 


10/7/19 19:09  112      


 


10/7/19 18:46     97 Nasal Cannula 3.0 32


 


10/7/19 18:45  103 22  99 Nasal Cannula 3.0 32


 


10/7/19 17:15    138/69    


 


10/7/19 16:00      Room Air  


 


10/7/19 16:00  109      


 


10/7/19 16:00 98.1 107 18 138/69 (92) 99   


 


10/7/19 12:00 98.1 100 18 145/88 (107) 99   


 


10/7/19 12:00  103      


 


10/7/19 12:00      Room Air  

















Intake and Output  


 


 10/7/19 10/8/19





 19:00 07:00


 


Intake Total 395 ml 


 


Output Total 1950 ml 700 ml


 


Balance -1555 ml -700 ml


 


  


 


Intake Oral 240 ml 


 


IV Total 155 ml 


 


Output Urine Total 1950 ml 700 ml











Laboratory Tests








Test


  10/8/19


05:32


 


White Blood Count


  14.2 K/UL


(4.8-10.8)  H


 


Red Blood Count


  3.54 M/UL


(4.70-6.10)  L


 


Hemoglobin


  10.2 G/DL


(14.2-18.0)  L


 


Hematocrit


  30.4 %


(42.0-52.0)  L


 


Mean Corpuscular Volume 86 FL (80-99)  


 


Mean Corpuscular Hemoglobin


  28.8 PG


(27.0-31.0)


 


Mean Corpuscular Hemoglobin


Concent 33.5 G/DL


(32.0-36.0)


 


Red Cell Distribution Width


  12.8 %


(11.6-14.8)


 


Platelet Count


  274 K/UL


(150-450)


 


Mean Platelet Volume


  7.0 FL


(6.5-10.1)


 


Neutrophils (%) (Auto)


  75.9 %


(45.0-75.0)  H


 


Lymphocytes (%) (Auto)


  13.8 %


(20.0-45.0)  L


 


Monocytes (%) (Auto)


  8.7 %


(1.0-10.0)


 


Eosinophils (%) (Auto)


  0.9 %


(0.0-3.0)


 


Basophils (%) (Auto)


  0.7 %


(0.0-2.0)


 


Sodium Level


  141 MMOL/L


(136-145)


 


Potassium Level


  3.8 MMOL/L


(3.5-5.1)


 


Chloride Level


  107 MMOL/L


()


 


Carbon Dioxide Level


  24 MMOL/L


(21-32)


 


Anion Gap


  10 mmol/L


(5-15)


 


Blood Urea Nitrogen


  21 mg/dL


(7-18)  H


 


Creatinine


  1.9 MG/DL


(0.55-1.30)  H


 


Estimat Glomerular Filtration


Rate  mL/min (>60)  


 


 


Glucose Level


  196 MG/DL


()  #H


 


Calcium Level


  8.4 MG/DL


(8.5-10.1)  L


 


Phosphorus Level


  3.5 MG/DL


(2.5-4.9)


 


Magnesium Level


  1.9 MG/DL


(1.8-2.4)


 


Random Vancomycin Level 10.8 ug/mL  


 


Digoxin Level


  1.3 NG/ML


(0.5-2.0)











Microbiology








 Date/Time


Source Procedure


Growth Status


 


 


 10/5/19 14:35


Blood Blood Culture - Preliminary


NO GROWTH AFTER 48 HOURS Resulted


 


 10/5/19 14:20


Blood Blood Culture - Preliminary


NO GROWTH AFTER 48 HOURS Resulted


 


 10/6/19 09:50


Straight Cath Urine Culture - Preliminary


NO GROWTH AFTER 24 HOURS Resulted








Objective





HEAD AND NECK:  Positive JVD.


LUNGS:  Decreased breath sounds and bibasilar rales.


CARDIOVASCULAR: Tachycardic, S1 and S2 with no gallop.


ABDOMEN:  Soft.


EXTREMITIES:  1+ pitting edema.











Tristen Malave MD Oct 8, 2019 10:27

## 2019-10-08 NOTE — NUR
NURSE NOTES:

Dr Malave called, informed that pt agreed for heart cath, and he said to have CM get 
authorization from his insurance, Luz Marina PAYNE made aware and she said to call [facility] 
and speak to house supervisor, spoke to paul Cline) from [facility] and informed her 
that will fax pt's face sheet at [phone]. per CM authorization will be check tomorrow, 
Dr Malave made aware.

## 2019-10-08 NOTE — UROLOGY PROGRESS NOTE
Assessment/Plan


Status:  stable


Assessment/Plan:


1. Urinary retention.


2. Benign prostatic hypertrophy.


3. Probable neurogenic bladder.


4. Renal insufficiency, acute versus chronic.


5. Renal cyst.


6. Rule out nephrolithiasis.


7. Hematuria.





monitor clinically


maintain benjamin for now


monitor renal fxn


benjamin secured to leg


hand irrigated and do PRN


cont flomax and proscar


abx as ordered


f/u on cx's


voiding trial later





Subjective


Allergies:  


Coded Allergies:  


     No Known Allergies (Unverified , 6/2/16)


Subjective


all noted





Objective





Last 24 Hour Vital Signs








  Date Time  Temp Pulse Resp B/P (MAP) Pulse Ox O2 Delivery O2 Flow Rate FiO2


 


10/8/19 07:42     97 Nasal Cannula 3.0 32


 


10/8/19 07:42  86 20  97 Nasal Cannula 3.0 32


 


10/8/19 04:00  95      


 


10/8/19 04:00 98.6 94 18 139/75 (96) 96   


 


10/8/19 00:00  99      


 


10/8/19 00:00 99.0 97 18 142/70 (94) 97   


 


10/7/19 21:27  107  153/74    


 


10/7/19 21:00 98.7 107 20 153/74 (100) 95   


 


10/7/19 20:00      Room Air  


 


10/7/19 20:00 99.4 112 20 145/70 (95) 97   


 


10/7/19 19:09  112      


 


10/7/19 18:46     97 Nasal Cannula 3.0 32


 


10/7/19 18:45  103 22  99 Nasal Cannula 3.0 32


 


10/7/19 17:15    138/69    


 


10/7/19 16:00      Room Air  


 


10/7/19 16:00  109      


 


10/7/19 16:00 98.1 107 18 138/69 (92) 99   


 


10/7/19 12:00 98.1 100 18 145/88 (107) 99   


 


10/7/19 12:00  103      


 


10/7/19 12:00      Room Air  


 


10/7/19 08:50  98  133/70    

















Intake and Output  


 


 10/7/19 10/8/19





 19:00 07:00


 


Intake Total 395 ml 


 


Output Total 1950 ml 700 ml


 


Balance -1555 ml -700 ml


 


  


 


Intake Oral 240 ml 


 


IV Total 155 ml 


 


Output Urine Total 1950 ml 700 ml











Microbiology








 Date/Time


Source Procedure


Growth Status


 


 


 10/5/19 14:35


Blood Blood Culture - Preliminary


NO GROWTH AFTER 48 HOURS Resulted


 


 10/4/19 04:15


Nasal Nares MRSA Culture - Final


NO METHICILLIN RESISTANT STAPH AUREUS... Complete


 


 10/6/19 09:50


Straight Cath Urine Culture - Preliminary


NO GROWTH AFTER 24 HOURS Resulted


 


 10/4/19 04:15


Rectum - Final


NO CARBAPENEM-RESISTANT ENTEROBACTERI... Complete








Current Medications








 Medications


  (Trade)  Dose


 Ordered  Sig/Sherly


 Route


 PRN Reason  Start Time


 Stop Time Status Last Admin


Dose Admin


 


 Acetaminophen


  (Tylenol)  500 mg  Q6H  PRN


 ORAL


 PAIN 1-6  10/7/19 22:15


 11/5/19 10:14   


 


 


 Amiodarone HCl


  (Cordarone)  400 mg  EVERY 12  HOURS


 ORAL


   10/7/19 21:00


 11/6/19 20:59  10/7/19 21:27


 


 


 Apixaban


  (Eliquis)  2.5 mg  BID


 ORAL


   10/8/19 09:00


 11/4/19 17:59   


 


 


 Aspirin


  (ASA)  81 mg  DAILY


 ORAL


   10/8/19 09:00


 11/3/19 11:14   


 


 


 Atorvastatin


 Calcium


  (Lipitor)  20 mg  BEDTIME


 ORAL


   10/7/19 21:00


 11/3/19 20:59  10/7/19 21:27


 


 


 Carvedilol


  (Coreg)  25 mg  EVERY 12  HOURS


 ORAL


   10/7/19 21:00


 11/6/19 20:59  10/7/19 21:27


 


 


 Cefepime HCl 2 gm/


 Dextrose  55 ml @ 


 110 mls/hr  Q24H


 IVPB


   10/8/19 09:00


 10/12/19 08:59   


 


 


 Clopidogrel


 Bisulfate


  (Plavix)  75 mg  DAILY


 ORAL


   10/8/19 09:00


 11/3/19 11:14   


 


 


 Dextrose


  (Dextrose 50%)  25 ml  Q30M  PRN


 IV


 Hypoglycemia  10/7/19 21:15


 11/3/19 11:14   


 


 


 Dextrose


  (Dextrose 50%)  50 ml  Q30M  PRN


 IV


 Hypoglycemia  10/7/19 21:15


 11/3/19 11:14   


 


 


 Famotidine


  (Pepcid I.v.)  20 mg  Q12HR


 IVP


   10/7/19 21:00


 11/3/19 20:59  10/7/19 21:26


 


 


 Finasteride


  (Proscar)  5 mg  DAILY


 ORAL


   10/8/19 09:00


 11/7/19 08:59   


 


 


 Furosemide


  (Lasix)  40 mg  EVERY 12  HOURS


 IV


   10/7/19 21:00


 11/3/19 08:59   


 


 


 Hydralazine HCl


  (Apresoline)  10 mg  Q12HR


 ORAL


   10/8/19 09:00


 11/7/19 08:59   


 


 


 Insulin Aspart


  (NovoLOG)    AC+HS


 SUBQ


   10/7/19 22:30


 11/3/19 22:29  10/8/19 06:25


 


 


 Insulin Detemir


  (Levemir)  15 units  BEDTIME


 SUBQ


   10/7/19 22:30


 11/3/19 22:29  10/7/19 23:30


 


 


 Insulin Detemir


  (Levemir)  25 units  DAILY


 SUBQ


   10/8/19 09:00


 11/3/19 11:14   


 


 


 Isosorbide


 Mononitrate


  (Imdur)  30 mg  DAILY


 ORAL


   10/8/19 09:00


 11/7/19 08:59   


 


 


 Metronidazole  100 ml @ 


 100 mls/hr  Q8HR


 IVPB


   10/7/19 22:00


 10/11/19 19:59  10/8/19 05:34


 


 


 Tamsulosin HCl


  (Flomax)  0.4 mg  BEDTIME


 ORAL


   10/7/19 21:00


 11/5/19 20:59  10/7/19 21:27


 


 


 Tramadol HCl


  (Ultram)  50 mg  Q6H  PRN


 ORAL


 Severe Pain (Pain Scale 7-10)  10/7/19 22:15


 10/13/19 10:14   


 


 


 Vancomycin HCl


  (Vanco rx to


 dose)  1 ea  DAILY  PRN


 MISC


 Per rx protocol  10/7/19 21:15


 11/6/19 21:14   


 


 


 Vancomycin HCl 1


 gm/Sodium Chloride  275 ml @ 


 275 mls/hr  0930


 IVPB


   10/8/19 09:30


 10/13/19 09:29   


 





Laboratory Tests


10/8/19 05:32: 


White Blood Count 14.2H, Red Blood Count 3.54L, Hemoglobin 10.2L, Hematocrit 

30.4L, Mean Corpuscular Volume 86, Mean Corpuscular Hemoglobin 28.8, Mean 

Corpuscular Hemoglobin Concent 33.5, Red Cell Distribution Width 12.8, Platelet 

Count 274, Mean Platelet Volume 7.0, Neutrophils (%) (Auto) 75.9H, Lymphocytes (

%) (Auto) 13.8L, Monocytes (%) (Auto) 8.7, Eosinophils (%) (Auto) 0.9, 

Basophils (%) (Auto) 0.7, Sodium Level 141, Potassium Level 3.8, Chloride Level 

107, Carbon Dioxide Level 24, Anion Gap 10, Blood Urea Nitrogen 21H, Creatinine 

1.9H, Estimat Glomerular Filtration Rate , Glucose Level 196#H, Calcium Level 

8.4L, Phosphorus Level 3.5, Magnesium Level 1.9, Random Vancomycin Level 10.8, 

Digoxin Level 1.3


Height (Feet):  5


Height (Inches):  6.00


Weight (Pounds):  167


Objective


 exam stable


benjamin indwelling with christen urine











Apolinar Fraser MD Oct 8, 2019 08:48

## 2019-10-09 VITALS — DIASTOLIC BLOOD PRESSURE: 68 MMHG | SYSTOLIC BLOOD PRESSURE: 130 MMHG

## 2019-10-09 VITALS — DIASTOLIC BLOOD PRESSURE: 68 MMHG | SYSTOLIC BLOOD PRESSURE: 135 MMHG

## 2019-10-09 VITALS — SYSTOLIC BLOOD PRESSURE: 130 MMHG | DIASTOLIC BLOOD PRESSURE: 65 MMHG

## 2019-10-09 VITALS — DIASTOLIC BLOOD PRESSURE: 66 MMHG | SYSTOLIC BLOOD PRESSURE: 131 MMHG

## 2019-10-09 VITALS — DIASTOLIC BLOOD PRESSURE: 67 MMHG | SYSTOLIC BLOOD PRESSURE: 134 MMHG

## 2019-10-09 VITALS — DIASTOLIC BLOOD PRESSURE: 56 MMHG | SYSTOLIC BLOOD PRESSURE: 110 MMHG

## 2019-10-09 LAB
% IRON SATURATION: 17 % (ref 15–50)
ADD MANUAL DIFF: NO
ALBUMIN SERPL-MCNC: 2.1 G/DL (ref 3.4–5)
ALBUMIN/GLOB SERPL: 0.5 {RATIO} (ref 1–2.7)
ALP SERPL-CCNC: 163 U/L (ref 46–116)
ALT SERPL-CCNC: 24 U/L (ref 12–78)
ANION GAP SERPL CALC-SCNC: 9 MMOL/L (ref 5–15)
AST SERPL-CCNC: 22 U/L (ref 15–37)
BASOPHILS NFR BLD AUTO: 0.4 % (ref 0–2)
BILIRUB SERPL-MCNC: 0.4 MG/DL (ref 0.2–1)
BUN SERPL-MCNC: 24 MG/DL (ref 7–18)
CALCIUM SERPL-MCNC: 8.3 MG/DL (ref 8.5–10.1)
CHLORIDE SERPL-SCNC: 108 MMOL/L (ref 98–107)
CHOLEST SERPL-MCNC: 50 MG/DL (ref ?–200)
CO2 SERPL-SCNC: 24 MMOL/L (ref 21–32)
CREAT SERPL-MCNC: 2.1 MG/DL (ref 0.55–1.3)
EOSINOPHIL NFR BLD AUTO: 1.3 % (ref 0–3)
ERYTHROCYTE [DISTWIDTH] IN BLOOD BY AUTOMATED COUNT: 12.8 % (ref 11.6–14.8)
FERRITIN SERPL-MCNC: 313 NG/ML (ref 8–388)
GAMMA GLUTAMYL TRANSPEPTIDASE: 198 U/L (ref 5–85)
GLOBULIN SER-MCNC: 3.9 G/DL
HCT VFR BLD CALC: 29.1 % (ref 42–52)
HDLC SERPL-MCNC: 18 MG/DL (ref 40–60)
HGB BLD-MCNC: 9.4 G/DL (ref 14.2–18)
IRON SERPL-MCNC: 19 UG/DL (ref 50–175)
LYMPHOCYTES NFR BLD AUTO: 12.2 % (ref 20–45)
MCV RBC AUTO: 87 FL (ref 80–99)
MONOCYTES NFR BLD AUTO: 7.7 % (ref 1–10)
NEUTROPHILS NFR BLD AUTO: 78.4 % (ref 45–75)
PHOSPHATE SERPL-MCNC: 3.7 MG/DL (ref 2.5–4.9)
PLATELET # BLD: 256 K/UL (ref 150–450)
POTASSIUM SERPL-SCNC: 4 MMOL/L (ref 3.5–5.1)
RBC # BLD AUTO: 3.34 M/UL (ref 4.7–6.1)
SODIUM SERPL-SCNC: 141 MMOL/L (ref 136–145)
TIBC SERPL-MCNC: 113 UG/DL (ref 250–450)
TRIGL SERPL-MCNC: 107 MG/DL (ref 30–150)
UNSATURATED IRON BINDING: 94 UG/DL (ref 112–346)
WBC # BLD AUTO: 15 K/UL (ref 4.8–10.8)

## 2019-10-09 RX ADMIN — INSULIN ASPART SCH UNITS: 100 INJECTION, SOLUTION INTRAVENOUS; SUBCUTANEOUS at 11:30

## 2019-10-09 RX ADMIN — HYDRALAZINE HYDROCHLORIDE SCH MG: 50 TABLET ORAL at 21:22

## 2019-10-09 RX ADMIN — ASPIRIN 81 MG SCH MG: 81 TABLET ORAL at 08:54

## 2019-10-09 RX ADMIN — ATORVASTATIN CALCIUM SCH MG: 20 TABLET, FILM COATED ORAL at 21:21

## 2019-10-09 RX ADMIN — INSULIN ASPART SCH UNITS: 100 INJECTION, SOLUTION INTRAVENOUS; SUBCUTANEOUS at 05:50

## 2019-10-09 RX ADMIN — SUCRALFATE SCH GM: 1 TABLET ORAL at 18:00

## 2019-10-09 RX ADMIN — CARVEDILOL SCH MG: 25 TABLET, FILM COATED ORAL at 08:54

## 2019-10-09 RX ADMIN — INSULIN ASPART SCH UNITS: 100 INJECTION, SOLUTION INTRAVENOUS; SUBCUTANEOUS at 21:29

## 2019-10-09 RX ADMIN — INSULIN ASPART SCH UNITS: 100 INJECTION, SOLUTION INTRAVENOUS; SUBCUTANEOUS at 16:30

## 2019-10-09 RX ADMIN — AMIODARONE HYDROCHLORIDE SCH MG: 200 TABLET ORAL at 08:53

## 2019-10-09 RX ADMIN — HYDRALAZINE HYDROCHLORIDE SCH MG: 25 TABLET ORAL at 13:06

## 2019-10-09 RX ADMIN — INSULIN DETEMIR SCH UNITS: 100 INJECTION, SOLUTION SUBCUTANEOUS at 21:29

## 2019-10-09 RX ADMIN — PANTOPRAZOLE SODIUM SCH MG: 40 INJECTION, POWDER, FOR SOLUTION INTRAVENOUS at 08:52

## 2019-10-09 RX ADMIN — CARVEDILOL SCH MG: 25 TABLET, FILM COATED ORAL at 21:21

## 2019-10-09 RX ADMIN — SUCRALFATE SCH GM: 1 TABLET ORAL at 21:20

## 2019-10-09 RX ADMIN — TAMSULOSIN HYDROCHLORIDE SCH MG: 0.4 CAPSULE ORAL at 18:00

## 2019-10-09 RX ADMIN — HYDRALAZINE HYDROCHLORIDE SCH MG: 25 TABLET ORAL at 05:57

## 2019-10-09 RX ADMIN — INSULIN DETEMIR SCH UNITS: 100 INJECTION, SOLUTION SUBCUTANEOUS at 09:00

## 2019-10-09 RX ADMIN — HYDRALAZINE HYDROCHLORIDE SCH MG: 50 TABLET ORAL at 14:22

## 2019-10-09 RX ADMIN — APIXABAN SCH MG: 2.5 TABLET, FILM COATED ORAL at 08:55

## 2019-10-09 RX ADMIN — DOCUSATE SODIUM SCH MG: 100 CAPSULE, LIQUID FILLED ORAL at 08:54

## 2019-10-09 RX ADMIN — DOCUSATE SODIUM SCH MG: 100 CAPSULE, LIQUID FILLED ORAL at 18:00

## 2019-10-09 RX ADMIN — TAMSULOSIN HYDROCHLORIDE SCH MG: 0.4 CAPSULE ORAL at 08:54

## 2019-10-09 RX ADMIN — ISOSORBIDE MONONITRATE SCH MG: 30 TABLET, EXTENDED RELEASE ORAL at 08:53

## 2019-10-09 RX ADMIN — PANTOPRAZOLE SODIUM SCH MG: 40 INJECTION, POWDER, FOR SOLUTION INTRAVENOUS at 21:22

## 2019-10-09 NOTE — NUR
NURSE NOTES: Nurse report given by Zhanna and BRANDON Martínez. Patient's sleeping in bed but easily 
awake. Bed low and locked, call light within reach, safety precaution is on, side rails x2. 
IVs are saline locked, patent an asymptomatic. Patient's currently NPO for abdominal US and 
possible catheterization at [facility], endorsed by BRANDON Martínez. An is draining well, straw 
color urine. Will continue to monitor.

## 2019-10-09 NOTE — NUR
NOTES







SPOKE WITH CHLOÉ FROM [facility] REQUEST FAXED FOR TRANSFER. PER CHLOÉ INSURANCE IS CONTRACTED 
WITH [facility] AND OK TO SEND. SPOKE WITH DEONTE AT [facility] SHE IS UNABLE  TO ACCEPT PT WITHOUT 
AUTHORIZATION. WILL FOLLOW UP ON AUTH.

## 2019-10-09 NOTE — NUR
CASE MANAGEMENT: REVIEW



SI: A-FIB w/RVR 

T 97.9 HR 74 RR 20 /68 SAT 99% ROOM AIR

WBC 15.0 H/H 9.4/29.1 





IS: AMIODARONE PO QD

ISOSORBIDE PO QD

COLACE PO TID

PLAVIX PO QD

ASA PO QD

LIPITOR PO QD

COREG PO QD





***TELEMETRY UNIT STATUS***

DCP: PATIENT IS FROM TRANSFER TO [facility] FOR CARDIAC CATH

## 2019-10-09 NOTE — GENERAL PROGRESS NOTE
Assessment/Plan


Problem List:  


(1) HTN (hypertension)


ICD Codes:  I10 - Essential (primary) hypertension


SNOMED:  08583416


(2) DM (diabetes mellitus)


ICD Codes:  E11.9 - Type 2 diabetes mellitus without complications


SNOMED:  14874099


(3) Acute exacerbation of CHF (congestive heart failure)


ICD Codes:  I50.9 - Heart failure, unspecified


SNOMED:  387711244, [id number]


Qualifiers:  


   Qualified Codes:  I50.9 - Heart failure, unspecified


(4) Atrial fibrillation with rapid ventricular response


ICD Codes:  I48.91 - Unspecified atrial fibrillation


SNOMED:  860652347848452


(5) Hypoxemia


ICD Codes:  R09.02 - Hypoxemia


SNOMED:  681739128


(6) MERLYN (acute kidney injury)


ICD Codes:  N17.9 - Acute kidney failure, unspecified


SNOMED:  91845892, 9472779


(7) Cellulitis in diabetic foot


ICD Codes:  E11.628 - Type 2 diabetes mellitus with other skin complications; 

L03.119 - Cellulitis of unspecified part of limb


SNOMED:  445691638, 29500809


(8) Sepsis


ICD Codes:  A41.9 - Sepsis, unspecified organism


SNOMED:  22464871, 32914190


Qualifiers:  


   Qualified Codes:  A41.9 - Sepsis, unspecified organism; R65.20 - Severe 

sepsis without septic shock; J96.01 - Acute respiratory failure with hypoxia


Status:  stable


Assessment/Plan:


This is a 74 year old male with a PMHx Dm2, HTN, CHF, PAD s/p left iliac stent 

placement 10/25/19 and subsequent left 2nd toe amputation for gangrene/

osteomyelitis, CAD who presents with acute hypoxic respiratory failure 2/2 CHF 

exacerbation and sepsis. Also with MERLYN, new onset atrial fibrillation. 





#Postprandial epigastric abdominal pain.  Differential diagnosis includes 

peptic ulcer pancreatitis GI bleed, mesenteric ischemia.  Doubt ACS


> abdominal Ultrasound negative: S/p cholecystectomy


-IV Protonix twice daily


-Appreciate GI consult: Dr. Ga


-Appreciated surgery consult: Dr. Meléndez


-CT abdomen pelvis with oral contrast.  IV contrast held due to kidney failure.


-Check lipase


-Check a stool occult blood





#hyperthyroidism, new diagnosis


> TSH 0.05, T4 1.95


- Appreciate Endocrinology consult: Dr. Alexander





#leukocytosis. . Differential diagnosis includes reactive, hemoconcentration, 

infection although patient is on antibiotics.  ACS ruled out


- continue to monitor


- continue antibiotics as above.





#acute hypoxic respiratory failure 2/2 systolic CHF exacerbation (new diagnosis

) and possible pneumonia- no known history of CHF. Also treating for possible 

pneumonia - IMPROVING


- telemetry


- titrate SpO2 to keep O2 >92%


- f/u blood cultures. 1/4 sets clostridium perfingens. Likely contaminant per 

ID. Surveilance cultures negative to date


- f/u sputum culture


- Vancomycin, Cefepime and Flagyl Day 6


- Holding lasix 40mg IV BID per discussion with cardiology/nephrology


- Continue afterload reduction with hydralazine and Imdur per cardiology and 

nephrology


- ACE-I contraindicated in setting of acute renal failure


- Per cardiology given low EF will need ischemia workup. Per cardiology, an 

extensive discussion was performed at bedside using a  area, 

to discuss the need for an ischemia workup including a cardiac angiogram.  The 

patient initially declined any further cardiac intervention but now agrees. 

Pending insurance authorization for transfer to [facility] for cariac 

cath. 


- appreciate Cardiology recommendations: Dr. Malave


- Appreciate Infectious Disease recommendations: Dr. Bergman





#Atrial fibrillation with RVR, new diagnosis. - IMPROVINg, althought still with 

episodes of tachycardia


CHADS-VASC 5 - risk of stroke 7.2% per year


HASBLED - 3 - risk of bleeding 5.8% per year


> Explained to patient risks and benefits of anticoagulation with Asa/Plavix 

and an anticoagulant. Patient understands the risks of stroke are > than risk 

of bleeding and would like to take the anticoagulant. Will start patient on 

eliquis renal dosing 2.5mg PO BID


- s/p Diltiazem Gtt


- Coreg and digoxin for heart rate controlled


- appreicate cardiology recommendations





#sepsis (WBC, heart rate, respiratory rate) 2/2 pneumonia - IMPROVING


#Clostridium perfingens in 1/2 blood culture- likely contaminant per ID


> C diff negative


> ESR 74, CRP 1.4, unlikely osteomyelitis


- cultures as above


- Repeat blood cultures: negative so far


- antibiotics as above





#acute urinary retention, new problem


> UA and urine culture negative


- Inserted benjamin on 10/6/10


- Tamsulosin 0.4mg PO daily


- Finasteride


- Appreciate urology recommendations: Dr. Jiang.


- Per urology continue benjamin on discharge. Follow up as outpatient





#Acute on Chronic Renal failure 2/2 CHF exacerbation and urinary obstruction - 

Stabilizing. Not back to baseline yet


> Baseline Cr 1.5


> Renal ultrasound negative


- IV diuresis as above


- continue to trend 


- avoid nephrotoxins


- replace lytes


- Appreciate nephrology consultation: Dr. Shah





#Hypertension


#hyperlipidemia


#CAD


-hold home amlodipine


- continue Coreg as above


- ASA/Plavix as above





#hypokalemia


#hypomagnesemia


- replete


- continue to monitor with labs





 #lactic acidosis suspected 2/2 CHF exacerbation vs. sepsis - RESOLVED


- trended down to 1


- management as above





#S/p left 2nd toe amputation due to gangrene/osteomyelitis


#s/p recent left iliac stent on ASA/Plavix 10 days ago.


> ESR 73, CRP 1.4


- wound consult


- request records from [facility]





#Diabetes mellitus type 1 per patient (suspect this is an error and has type 2) 

with hyperglycemia-  improved


> Hemoglobin A1C 8.6 on 10/6/10


- accuchecks Q6hr


- Diabetic diet


- Levemir 25 units QAM and 15 units QPM (home is 25 and 30)


- SSI, average





FENPPx


DVT PPx: eliquis on hold, SCDs


GI PPX: protonix


Fluids: none


Diet: Diabetic diet


Lines: none


PT/OT: pending





Dispo: [facility] 





D/w RN, patient, Cardiology, Pulmonology, Gastroenterology, Surgery. I spent 41 

minutes on this encounter. >50% spent on counseling and care coordination. 





Time of note may not reflect time patient was seen





Subjective


Date patient seen:  Oct 9, 2019


Constitutional:  Denies: chills, diaphoresis, fever, malaise, weakness, other


HEENT:  Denies: eye pain, blurred vision, tearing, double vision, ear pain, ear 

discharge, nose pain, nose congestion, throat pain, throat swelling, mouth pain

, mouth swelling, other


Cardiovascular:  Denies: chest pain, edema, irregular heart rate, 

lightheadedness, palpitations, syncope, other


Respiratory:  Denies: cough, orthopnea, shortness of breath, SOB with excertion

, SOB at rest, sputum, stridor, wheezing, other


Gastrointestinal/Abdominal:  Reports: abdominal pain, black stools, tarry stools

; Denies: abdomen distended, blood in stool, constipated, diarrhea, difficulty 

swallowing, nausea, poor appetite, poor fluid intake, rectal bleeding, vomiting

, other


Genitourinary:  Denies: burning, discharge, frequency, flank pain, hematuria, 

incontinence, pain, urgency, other


Neurologic/Psychiatric:  Denies: anxiety, depressed, emotional problems, 

headache, numbness, paresthesia, pre-existing deficit, seizure, tingling, 

tremors, weakness, other


Endocrine:  Denies: excessive sweating, flushing, intolerance to cold, 

intolerance to heat, increased hunger, increased thirst, increased urine, 

unexplained weight gain, unexplained weight loss, other


Hematologic/Lymphatic:  Denies: anemia, easy bleeding, easy bruising, other


Allergies:  


Coded Allergies:  


     No Known Allergies (Unverified , 6/2/16)


Subjective


No acute events overnight per nursing. Today the patient endorses epigastric 

abdominal pain, it is postprandial and radiates to right and left lower 

abdominal quadrants.  He rates it as a 5 out of 10 and lasts for 1 hour.  It is 

associated with nausea but no vomiting.  The patient also endorses noticing 

black stools for the past 3 days.  He denies any fevers chills back pain chest 

pain.  She endorses that his shortness of breath is improved.





Afib: Heart rate is better controlled the patient is tolerating the medications 

well.  He denies any palpitations or syncope.


Pneumonia/CHF: The patient is on room air.  He endorses that his breathing is 

well today.  He has lost a total 7 kg While inpatient





Objective





Last 24 Hour Vital Signs








  Date Time  Temp Pulse Resp B/P (MAP) Pulse Ox O2 Delivery O2 Flow Rate FiO2


 


10/9/19 13:06    135/68    


 


10/9/19 12:00  72      


 


10/9/19 12:00 98.5 71 20 135/68 (90) 97   


 


10/9/19 08:54  68  130/65    


 


10/9/19 08:53    130/65    


 


10/9/19 08:00 98.7 77 19 130/65 (86) 98   


 


10/9/19 08:00  78      


 


10/9/19 07:55  86 20  97 Nasal Cannula 3.0 32


 


10/9/19 07:55     97 Nasal Cannula 3.0 32


 


10/9/19 05:57    127/62    


 


10/9/19 04:00  74      


 


10/9/19 04:00 98.0 75 18 131/66 (87) 98   


 


10/9/19 00:00  81      


 


10/9/19 00:00 97.6 78 18 110/56 (74) 96   


 


10/8/19 22:52    120/64    


 


10/8/19 21:22  90  114/46    


 


10/8/19 20:25  83 18  97 Nasal Cannula 2.0 28


 


10/8/19 20:05     97 Nasal Cannula 2.0 28


 


10/8/19 20:00  91      


 


10/8/19 20:00 98.1 90 18 114/46 (68) 95   


 


10/8/19 16:00  90      


 


10/8/19 16:00 97.2 96 18 135/68 (90) 96   


 


10/8/19 14:17    120/54    

















Intake and Output  


 


 10/8/19 10/9/19





 19:00 07:00


 


Intake Total  120 ml


 


Output Total  500 ml


 


Balance  -380 ml


 


  


 


Intake Oral  120 ml


 


Output Urine Total  500 ml


 


# Bowel Movements  1








Laboratory Tests


10/8/19 19:44: 


Sodium Level 141, Potassium Level 3.6, Chloride Level 107, Carbon Dioxide Level 

25, Anion Gap 9, Blood Urea Nitrogen 21H, Creatinine 2.0H, Estimat Glomerular 

Filtration Rate , Glucose Level 234H, Calcium Level 8.4L


10/9/19 05:39: 


Sodium Level 141, Potassium Level 4.0, Chloride Level 108H, Carbon Dioxide 

Level 24, Anion Gap 9, Blood Urea Nitrogen 24H, Creatinine 2.1H, Estimat 

Glomerular Filtration Rate , Glucose Level 172H, Calcium Level 8.3L, White 

Blood Count 15.0H, Red Blood Count 3.34L, Hemoglobin 9.4L, Hematocrit 29.1L, 

Mean Corpuscular Volume 87, Mean Corpuscular Hemoglobin 28.2, Mean Corpuscular 

Hemoglobin Concent 32.5, Red Cell Distribution Width 12.8, Platelet Count 256, 

Mean Platelet Volume 7.2, Neutrophils (%) (Auto) 78.4H, Lymphocytes (%) (Auto) 

12.2L, Monocytes (%) (Auto) 7.7, Eosinophils (%) (Auto) 1.3, Basophils (%) (Auto

) 0.4, Erythrocyte Sedimentation Rate 74H, Hemoglobin A1c 8.6H, Uric Acid 8.8H, 

Phosphorus Level 3.7, Magnesium Level 2.0, Iron Level 19L, Total Iron Binding 

Capacity 113L, Percent Iron Saturation 17, Unsaturated Iron Binding 94L, 

Ferritin 313, Total Bilirubin 0.4, Gamma Glutamyl Transpeptidase 198H, 

Aspartate Amino Transf (AST/SGOT) 22, Alanine Aminotransferase (ALT/SGPT) 24, 

Alkaline Phosphatase 163H, C-Reactive Protein, Quantitative 4.4H, Pro-B-Type 

Natriuretic Peptide 82462A, Total Protein 6.0L, Albumin 2.1L, Globulin 3.9, 

Albumin/Globulin Ratio 0.5L, Triglycerides Level 107, Cholesterol Level 50, LDL 

Cholesterol 18, HDL Cholesterol 18L, Cholesterol/HDL Ratio 2.8L, Vitamin B12 

Level 402, Folate 22.5, Thyroid Stimulating Hormone (TSH) 0.050L, Free 

Thyroxine 1.95H, Free Triiodothyronine 2.1L, Digoxin Level 1.5


10/9/19 05:45: Urine Eosinophils None seen





TSH is 0.05 T4 1.956





BNP 11,852 6


A1c 8.6





% 17


Ferritin 94


Height (Feet):  5


Height (Inches):  6.00


Weight (Pounds):  168


General Appearance:  WD/WN, no apparent distress, alert


Neck:  non-tender, normal alignment, supple


Cardiovascular:  normal peripheral pulses, normal rate, other - irregular 

irregular, no murmurs


Respiratory/Chest:  chest wall non-tender, lungs clear, normal breath sounds


Abdomen:  normal bowel sounds, other - nondistended, +bowel sounds, TTP in 

epigastric with slight rebound


Extremities:  other - No LE edema bilaterally


Neurologic:  CNs II-XII grossly normal, no motor/sensory deficits, alert, 

oriented x 3, responsive


Skin:  normal pigmentation, warm/dry











Reynaldo Schaffer D.O. Oct 9, 2019 14:03

## 2019-10-09 NOTE — UROLOGY PROGRESS NOTE
Assessment/Plan


Status:  stable


Assessment/Plan:


1. Urinary retention.


2. Benign prostatic hypertrophy.


3. Probable neurogenic bladder.


4. Renal insufficiency, acute versus chronic.


5. Renal cyst.


6. Rule out nephrolithiasis.


7. Hematuria.


8. UTI or colonized urine.





monitor clinically


maintain benjamin for now


monitor renal fxn


benjamin secured to leg


hand irrigated and do PRN


cont flomax and proscar


abx as ordered


f/u on blood cx


voiding trial later


consider adding antifungals





Subjective


Allergies:  


Coded Allergies:  


     No Known Allergies (Unverified , 6/2/16)


Subjective


all noted





Objective





Last 24 Hour Vital Signs








  Date Time  Temp Pulse Resp B/P (MAP) Pulse Ox O2 Delivery O2 Flow Rate FiO2


 


10/9/19 07:55  86 20  97 Nasal Cannula 3.0 32


 


10/9/19 07:55     97 Nasal Cannula 3.0 32


 


10/9/19 05:57    127/62    


 


10/9/19 04:00  74      


 


10/9/19 04:00 98.0 75 18 131/66 (87) 98   


 


10/9/19 00:00  81      


 


10/9/19 00:00 97.6 78 18 110/56 (74) 96   


 


10/8/19 22:52    120/64    


 


10/8/19 21:22  90  114/46    


 


10/8/19 20:25  83 18  97 Nasal Cannula 2.0 28


 


10/8/19 20:05     97 Nasal Cannula 2.0 28


 


10/8/19 20:00  91      


 


10/8/19 20:00 98.1 90 18 114/46 (68) 95   


 


10/8/19 16:00  90      


 


10/8/19 16:00 97.2 96 18 135/68 (90) 96   


 


10/8/19 14:17    120/54    


 


10/8/19 12:00  90      


 


10/8/19 12:00 98.6 91 20 120/54 (76) 96   


 


10/8/19 10:48    140/74    


 


10/8/19 10:47  89  140/74    


 


10/8/19 10:44    140/74    


 


10/8/19 10:41 98.4 89 18 140/74 (96) 96   

















Intake and Output  


 


 10/8/19 10/9/19





 19:00 07:00


 


Intake Total  120 ml


 


Output Total  500 ml


 


Balance  -380 ml


 


  


 


Intake Oral  120 ml


 


Output Urine Total  500 ml


 


# Bowel Movements  1











Microbiology








 Date/Time


Source Procedure


Growth Status


 


 


 10/5/19 14:35


Blood Blood Culture - Preliminary


NO GROWTH AFTER 72 HOURS Resulted


 


 10/4/19 04:15


Nasal Nares MRSA Culture - Final


NO METHICILLIN RESISTANT STAPH AUREUS... Complete


 


 10/6/19 09:50


Straight Cath Urine Culture - Final


YEAST Complete


 


 10/4/19 04:15


Rectum - Final


NO CARBAPENEM-RESISTANT ENTEROBACTERI... Complete








Current Medications








 Medications


  (Trade)  Dose


 Ordered  Sig/Sherly


 Route


 PRN Reason  Start Time


 Stop Time Status Last Admin


Dose Admin


 


 Acetaminophen


  (Tylenol)  500 mg  Q6H  PRN


 ORAL


 PAIN 1-6  10/7/19 22:15


 11/5/19 10:14   


 


 


 Al Hydroxide/Mg


 Hydroxide


  (Mylanta)  30 ml  Q6H  PRN


 ORAL


 nausea/acid reflux  10/8/19 12:00


 11/7/19 11:59   


 


 


 Amiodarone HCl


  (Cordarone)  400 mg  EVERY 12  HOURS


 ORAL


   10/7/19 21:00


 11/6/19 20:59  10/8/19 21:24


 


 


 Amoxicillin/


 Clavulanate


 Potassium


  (Augmentin)  500 mg  EVERY 12  HOURS


 ORAL


   10/8/19 21:00


 10/15/19 20:59  10/8/19 21:24


 


 


 Apixaban


  (Eliquis)  2.5 mg  BID


 ORAL


   10/8/19 09:00


 11/4/19 17:59  10/8/19 10:46


 


 


 Aspirin


  (ASA)  81 mg  DAILY


 ORAL


   10/8/19 09:00


 11/3/19 11:14  10/8/19 10:47


 


 


 Atorvastatin


 Calcium


  (Lipitor)  20 mg  BEDTIME


 ORAL


   10/7/19 21:00


 11/3/19 20:59  10/8/19 21:23


 


 


 Carvedilol


  (Coreg)  25 mg  EVERY 12  HOURS


 ORAL


   10/7/19 21:00


 11/6/19 20:59  10/8/19 21:22


 


 


 Clopidogrel


 Bisulfate


  (Plavix)  75 mg  DAILY


 ORAL


   10/8/19 09:00


 11/3/19 11:14  10/8/19 10:47


 


 


 Dextrose


  (Dextrose 50%)  25 ml  Q30M  PRN


 IV


 Hypoglycemia  10/8/19 20:30


 11/7/19 20:29   


 


 


 Dextrose


  (Dextrose 50%)  50 ml  Q30M  PRN


 IV


 Hypoglycemia  10/8/19 20:30


 11/7/19 20:29   


 


 


 Docusate Sodium


  (Colace)  100 mg  TWICE A  DAY


 ORAL


   10/8/19 18:00


 11/7/19 17:59  10/8/19 17:55


 


 


 Finasteride


  (Proscar)  5 mg  DAILY


 ORAL


   10/8/19 09:00


 11/7/19 08:59  10/8/19 10:47


 


 


 Hydralazine HCl


  (Apresoline)  25 mg  Q8HR


 ORAL


   10/8/19 14:00


 11/7/19 13:59  10/9/19 05:57


 


 


 Insulin Aspart


  (NovoLOG)    BEFORE MEALS AND  HS


 SUBQ


   10/8/19 21:00


 11/7/19 20:59  10/8/19 21:27


 


 


 Insulin Detemir


  (Levemir)  15 units  BEDTIME


 SUBQ


   10/7/19 22:30


 11/3/19 22:29  10/8/19 21:26


 


 


 Insulin Detemir


  (Levemir)  25 units  DAILY


 SUBQ


   10/8/19 09:00


 11/3/19 11:14  10/8/19 11:26


 


 


 Isosorbide


 Mononitrate


  (Imdur)  30 mg  DAILY


 ORAL


   10/8/19 09:00


 11/7/19 08:59  10/8/19 10:44


 


 


 Ondansetron HCl


  (Zofran ODT)  4 mg  Q6H  PRN


 ORAL


 Nausea & Vomiting  10/8/19 10:15


 11/7/19 10:14  10/8/19 17:55


 


 


 Pantoprazole


  (Protonix)  40 mg  EVERY 12  HOURS


 IVP


   10/8/19 21:00


 11/7/19 20:59  10/8/19 21:22


 


 


 Tamsulosin HCl


  (Flomax)  0.4 mg  BID


 ORAL


   10/8/19 18:00


 11/5/19 20:59  10/8/19 17:54


 


 


 Tramadol HCl


  (Ultram)  50 mg  Q6H  PRN


 ORAL


 Severe Pain (Pain Scale 7-10)  10/7/19 22:15


 10/13/19 10:14   


 





Laboratory Tests


10/8/19 10:20: 


Total Creatine Kinase 28, Creatine Kinase MB 1.2, Creatine Kinase MB Relative 

Index 4.2, Troponin I 0.065H


10/8/19 19:44: 


Sodium Level 141, Potassium Level 3.6, Chloride Level 107, Carbon Dioxide Level 

25, Anion Gap 9, Blood Urea Nitrogen 21H, Creatinine 2.0H, Estimat Glomerular 

Filtration Rate , Glucose Level 234H, Calcium Level 8.4L


10/9/19 05:39: 


Sodium Level 141, Potassium Level 4.0, Chloride Level 108H, Carbon Dioxide 

Level 24, Anion Gap 9, Blood Urea Nitrogen 24H, Creatinine 2.1H, Estimat 

Glomerular Filtration Rate , Glucose Level 172H, Calcium Level 8.3L, White 

Blood Count 15.0H, Red Blood Count 3.34L, Hemoglobin 9.4L, Hematocrit 29.1L, 

Mean Corpuscular Volume 87, Mean Corpuscular Hemoglobin 28.2, Mean Corpuscular 

Hemoglobin Concent 32.5, Red Cell Distribution Width 12.8, Platelet Count 256, 

Mean Platelet Volume 7.2, Neutrophils (%) (Auto) 78.4H, Lymphocytes (%) (Auto) 

12.2L, Monocytes (%) (Auto) 7.7, Eosinophils (%) (Auto) 1.3, Basophils (%) (Auto

) 0.4, Hemoglobin A1c 8.6H, Uric Acid 8.8H, Phosphorus Level 3.7, Magnesium 

Level 2.0, Iron Level 19L, Total Iron Binding Capacity 113L, Percent Iron 

Saturation 17, Unsaturated Iron Binding 94L, Ferritin 313, Total Bilirubin 0.4, 

Gamma Glutamyl Transpeptidase 198H, Aspartate Amino Transf (AST/SGOT) 22, 

Alanine Aminotransferase (ALT/SGPT) 24, Alkaline Phosphatase 163H, C-Reactive 

Protein, Quantitative 4.4H, Pro-B-Type Natriuretic Peptide 13600H, Total 

Protein 6.0L, Albumin 2.1L, Globulin 3.9, Albumin/Globulin Ratio 0.5L, 

Triglycerides Level 107, Cholesterol Level 50, LDL Cholesterol 18, HDL 

Cholesterol 18L, Cholesterol/HDL Ratio 2.8L, Vitamin B12 Level 402, Folate 22.5

, Thyroid Stimulating Hormone (TSH) 0.050L, Free Thyroxine 1.95H, Free 

Triiodothyronine 2.1L, Digoxin Level 1.5


10/9/19 05:45: Urine Eosinophils None seen


Height (Feet):  5


Height (Inches):  6.00


Weight (Pounds):  168


Objective


 exam stable


benjamin indwelling with christen urine











Apolinar Fraser MD Oct 9, 2019 08:51

## 2019-10-09 NOTE — DIAGNOSTIC IMAGING REPORT
Indication: Sepsis, nausea, acute kidney injury, anemia, diabetes

 

Technique: Spiral acquisitions obtained through the abdomen and pelvis. Patient

ingested oral contrast. No IV contrast utilized,  per referring physician request..

Multiplanar reconstructions were generated. Total dose length product 1337 mGycm.

CTDIvol(s) 21 mGy. Dose reduction achieved using automated exposure control

 

 

Comparison: 8/12/2016

 

Findings: The appendix is prominent in caliber, but caliber is similar to the

previous exam and there is gas within the bulbous tip. There is soft tissue stranding

along the right paracolic gutter which was not evident previously. No definite colon

wall thickening is evident, however. There is no evidence of colonic diverticulosis

or diverticulitis. No small bowel distention or small bowel wall thickening. There is

trace fluid within the pelvis. No free intraperitoneal gas is evident. The distal

esophagus, stomach, duodenum are unremarkable.

 

Lack of IV contrast limits assessment of the solid organs. The gallbladder has been

removed in the interim. There is no biliary ductal dilatation. The liver, pancreas,

spleen, adrenals are unremarkable. Again demonstrated is fairly extensive bilateral

perinephric fat stranding. No renal or ureteral calculi, hydronephrosis, or

hydroureter. There is a An catheter present within the bladder, which is

nondistended. Small amount of air within the bladder lumen is presumably related to

the An catheterization. The prostate is somewhat prominent with calcifications.

This is also evident previously.

 

There is diffuse edema of the subcutaneous fat, slightly increased from the prior

study. There are now bilateral large pleural effusions. These result in compressive

atelectatic changes at both lung bases. No acute process otherwise interim placement

of a right common iliac artery stent. Patency is indeterminate due to the lack of IV

contrast.

 

The bones demonstrate fairly extensive degenerative proliferative changes of the

lumbar spine.

 

Impression: Nonspecific soft tissue stranding adjacent to the right colon, without

other evidence of colonic inflammatory change. Could represent diverticulitis related

to occult diverticula disease, among other possibilities

 

No definite acute process otherwise

 

Bilateral large pleural effusions. Compressive atelectatic changes of both lung bases

 

Interim cholecystectomy

 

Trace free pelvic fluid, significance/etiology uncertain

 

Nonspecific bilateral perinephric fat stranding, also evident previously

 

Diffuse edema of the subcutaneous fat, increased from previous exam

 

Other findings as noted, including degenerative proliferative spondylosis, right

common iliac artery stent, An catheter, prominent prostate with calcifications

 

This agrees with the preliminary interpretation provided overnight by [facility].

 

The CT scanner at [facility] is accredited by the [employer] College [employer]

Radiology and the scans are performed using protocols designed to limit radiation

exposure to as low as reasonably achievable to attain images of sufficient resolution

adequate for diagnostic evaluation.

## 2019-10-09 NOTE — NUR
SWALLOW/SPEECH NOTE:



REFERRED FOR A SWALLOW EVALUATION BY DR HALL, SEE FULL REPORT IN [facility]ARE ACTIVITY 
SECTION.



DYSPHAGIA RISK FACTORS FOR THIS 74 Y.O. Indian-SPEAKING (MX) MALE:



ACUTE ISSUES:  COUGHS WITH THIN LIQUIDS PER CNA (ROSIE), SEPSIS, POSSIBLE PNA (VS 
ATELECTASIS AND HAS PULMONARY EDEMA AND BILATERAL PLEURAL EFFUSION), AMS, HYPOXIC 
RESPIRATORY FAILURE WAS ON BIPAP NOW ON 3 LITERS 02 NC RR 18 AND SOB X5 DAYS, CHF 
EXACERBATION, NAUSEA AND DIARRHEA WITH POOR APPETITE.



H/O COPD, DM2, HTN, GERD MEDS.



POLST/AD NONE REGARDING TUBE FEEDINGS IF NEEDS.

AT HOME ON REGULAR TEXTURE DIET/LIQUIDS. PT DENIES SWALLOWING PROBLEMS AND SAYS HE SWALLOWS 
PILLS WHOLE W/O PROBLEMS. RNROVERTO, DENIES SWALLOWING PROBLEMS WITH MEDS/WATER. PER RD, PT 
IS OVERWEIGHT AND IS ON CCHO-MED BUT THEY RECOMMEND CCHO-LOW AND LOW NA. PER CNA THOUGH, HE 
COUGHS WITH THIN LIQUIDS DURING MEALS AND HAS NO OVERT S/S OF ASP WITH REGULAR MASTICATED 
AND TEXTURED SOLIDS (ON CCHO-MED TYPE). INTAKE IS VARIABLE 25 TO 75% AND MAY BE DUE TO POOR 
APPETITE AND NAUSEA. 



PATIENT IS ALERT AND ABLE TO COMMUNICATE NEEDS IN [place].



INITIAL IMPRESSIONS:

REPORTED S/S OF ASPIRATION WITH THIN LIQUIDS DURING MEALS (PER CNA)

AND MAY HAVE A PNEUMONIA (VERSUS ATELECTASIS, PLUS COPD DX INCREASES ASPIRATION RISK DUE TO 
SOB AND RESPIRATORY INCOORDINATION)



WITH SLP, GIVEN THIN LIQUIDS VIA CUP ONE SIP AT A TIME, 2-3 SIPS, APPEARED TO HAVE A GROSSLY 
FUNCTIONAL SWALLOW WITH NO OVERT S/S OF ASPIRATION.



UNABLE TO GIVE OTHER PO TRIALS DUE TO ULTRASOUND OF ABDOMEN TODAY BUT REPORTEDLY GROSSLY 
FUNCTIONAL SWALLOW WITH REGULAR TEXTURE MASTICATED SOLIDS AND ADEQUATE BUT VARIABLE INTAKE 
DUE TO POOR APPETITE AND NAUSEA (ADEQUATE DENTITION).



RECOMMENDATIONS:



CONSIDER MODIFIED BARIUM SWALLOW STUDY (MBSS) AS IP OR AT OTHER HOSPITAL TO FURTHER ASSESS 
SWALLOW, DETERMINE SILENT/AUDIBLE ASPIRATION AMOUNT/RISK/ETIOLOGY, AND ATTEMPT TRIAL TX 
TECHNIQUES.



WHEN CLEARED FOR PO, CONSIDER DOWNGRADING LIQUIDS TO NECTAR THICK UNTIL HE HAS THE MBSS IF 
THE PATIENT IS RECEPTIVE AND CONTINUE WITH REGULAR TEXTURE DIET.

CONSIDER CHANGING DIET TYPE TO CCHO-LOW AND LOW NA PER DIETITIAN'S RECOMMENDATIONS.



WILL GIVE THE EAT 10 QUESTIONNAIRE AND CONTINUE WITH DYSPHAGIA MANAGEMENT/TX HERE OR AT / 
HOSPITAL.



EDUCATED/TRAINED RN/CNA IN POSTED ASPIRATION AND REFLUX PRECAUTIONS (ON GERD MEDS).



LEFT MESSAGE WITH DR HALL AND DR MCCARTHY

## 2019-10-09 NOTE — NUR
NURSE NOTES: Left messages for Alberta Shay and Shellie  regarding transfer 
update patient to [facility] per Dr. Malave request. Awaiting for response

## 2019-10-09 NOTE — NUR
NURSE NOTES: Left message to KERRY Shay regarding any update for patient's transferring to 
[facility]. Awaiting for response.

## 2019-10-09 NOTE — NUR
NURSE NOTES:

Received report from BRANDON Tyson. Patient is awake, lying in semi tran's; resting 
comfortably. A/Ox4. Denies pain at this time. No signs of acute cardiorespiratory distress 
noted. Checked IV site and flushed. No erythema, bleeding or infiltration noted. On benjamin 
catheter draining well to gravity. Bed at lowest position, brakes on, siderails x 3. Call 
light within reach. Will continue to monitor.

## 2019-10-09 NOTE — CARDIAC ELECTROPHYSIOLOGY PN
Assessment/Plan


Assessment/Plan


1. CHF exacerbation. Echo EF 30-35%. Continue Lasix 40 iv bid, Hydralazine 10 

bid, Imdur 30 daily and Aldactone 25 daily.


    Awaiting to transfer for [facility] for cardiac cath. Will get prior Echo. If EF 

less than 35% for 3 months will need ICD implant





2. Atrial fib with RVR 160s. On Amiodarone 400 bid, Dig 0.125 daily and Coreg 

25 bid. Hold Eliquis for cardiac cath


Decrease Amio to 400 daily


3. Uncontrolled diabetes, on insulin.


4. Severe peripheral vascular disease, status post left second toe gangrene 

amputation for osteomyelitis followed by Podiatry and Vascular


Surgery.On Aspirin and Plavix





DW RN and Dr Gallagher


Transfer to [facility] [place] for cardiac cath


DW patient with . Patient agreeable to cardiac cath





Subjective


Subjective


Remained in SR. No CP. EF 30-35%. Awaiting insurance authorization to transfer 

to [facility] for cardiac cath





Objective





Last 24 Hour Vital Signs








  Date Time  Temp Pulse Resp B/P (MAP) Pulse Ox O2 Delivery O2 Flow Rate FiO2


 


10/9/19 08:54  68  130/65    


 


10/9/19 08:53    130/65    


 


10/9/19 08:00 98.7 77 19 130/65 (86) 98   


 


10/9/19 08:00  78      


 


10/9/19 07:55  86 20  97 Nasal Cannula 3.0 32


 


10/9/19 07:55     97 Nasal Cannula 3.0 32


 


10/9/19 05:57    127/62    


 


10/9/19 04:00  74      


 


10/9/19 04:00 98.0 75 18 131/66 (87) 98   


 


10/9/19 00:00  81      


 


10/9/19 00:00 97.6 78 18 110/56 (74) 96   


 


10/8/19 22:52    120/64    


 


10/8/19 21:22  90  114/46    


 


10/8/19 20:25  83 18  97 Nasal Cannula 2.0 28


 


10/8/19 20:05     97 Nasal Cannula 2.0 28


 


10/8/19 20:00  91      


 


10/8/19 20:00 98.1 90 18 114/46 (68) 95   


 


10/8/19 16:00  90      


 


10/8/19 16:00 97.2 96 18 135/68 (90) 96   


 


10/8/19 14:17    120/54    


 


10/8/19 12:00  90      


 


10/8/19 12:00 98.6 91 20 120/54 (76) 96   

















Intake and Output  


 


 10/8/19 10/9/19





 19:00 07:00


 


Intake Total  120 ml


 


Output Total  500 ml


 


Balance  -380 ml


 


  


 


Intake Oral  120 ml


 


Output Urine Total  500 ml


 


# Bowel Movements  1











Laboratory Tests








Test


  10/8/19


19:44 10/9/19


05:39 10/9/19


05:45


 


Sodium Level


  141 MMOL/L


(136-145) 141 MMOL/L


(136-145) 


 


 


Potassium Level


  3.6 MMOL/L


(3.5-5.1) 4.0 MMOL/L


(3.5-5.1) 


 


 


Chloride Level


  107 MMOL/L


() 108 MMOL/L


()  H 


 


 


Carbon Dioxide Level


  25 MMOL/L


(21-32) 24 MMOL/L


(21-32) 


 


 


Anion Gap


  9 mmol/L


(5-15) 9 mmol/L


(5-15) 


 


 


Blood Urea Nitrogen


  21 mg/dL


(7-18)  H 24 mg/dL


(7-18)  H 


 


 


Creatinine


  2.0 MG/DL


(0.55-1.30)  H 2.1 MG/DL


(0.55-1.30)  H 


 


 


Estimat Glomerular Filtration


Rate  mL/min (>60)  


   mL/min (>60)  


  


 


 


Glucose Level


  234 MG/DL


()  H 172 MG/DL


()  H 


 


 


Calcium Level


  8.4 MG/DL


(8.5-10.1)  L 8.3 MG/DL


(8.5-10.1)  L 


 


 


White Blood Count


  


  15.0 K/UL


(4.8-10.8)  H 


 


 


Red Blood Count


  


  3.34 M/UL


(4.70-6.10)  L 


 


 


Hemoglobin


  


  9.4 G/DL


(14.2-18.0)  L 


 


 


Hematocrit


  


  29.1 %


(42.0-52.0)  L 


 


 


Mean Corpuscular Volume  87 FL (80-99)   


 


Mean Corpuscular Hemoglobin


  


  28.2 PG


(27.0-31.0) 


 


 


Mean Corpuscular Hemoglobin


Concent 


  32.5 G/DL


(32.0-36.0) 


 


 


Red Cell Distribution Width


  


  12.8 %


(11.6-14.8) 


 


 


Platelet Count


  


  256 K/UL


(150-450) 


 


 


Mean Platelet Volume


  


  7.2 FL


(6.5-10.1) 


 


 


Neutrophils (%) (Auto)


  


  78.4 %


(45.0-75.0)  H 


 


 


Lymphocytes (%) (Auto)


  


  12.2 %


(20.0-45.0)  L 


 


 


Monocytes (%) (Auto)


  


  7.7 %


(1.0-10.0) 


 


 


Eosinophils (%) (Auto)


  


  1.3 %


(0.0-3.0) 


 


 


Basophils (%) (Auto)


  


  0.4 %


(0.0-2.0) 


 


 


Erythrocyte Sedimentation Rate  Pending   


 


Hemoglobin A1c


  


  8.6 %


(4.3-6.0)  H 


 


 


Uric Acid


  


  8.8 MG/DL


(2.6-7.2)  H 


 


 


Phosphorus Level


  


  3.7 MG/DL


(2.5-4.9) 


 


 


Magnesium Level


  


  2.0 MG/DL


(1.8-2.4) 


 


 


Iron Level


  


  19 ug/dL


()  L 


 


 


Total Iron Binding Capacity


  


  113 ug/dL


(250-450)  L 


 


 


Percent Iron Saturation  17 % (15-50)   


 


Unsaturated Iron Binding


  


  94 ug/dL


(112-346)  L 


 


 


Ferritin


  


  313 NG/ML


(8-388) 


 


 


Total Bilirubin


  


  0.4 MG/DL


(0.2-1.0) 


 


 


Gamma Glutamyl Transpeptidase


  


  198 U/L (5-85)


H 


 


 


Aspartate Amino Transf


(AST/SGOT) 


  22 U/L (15-37)


  


 


 


Alanine Aminotransferase


(ALT/SGPT) 


  24 U/L (12-78)


  


 


 


Alkaline Phosphatase


  


  163 U/L


()  H 


 


 


C-Reactive Protein,


Quantitative 


  4.4 mg/dL


(0.00-0.90)  H 


 


 


Pro-B-Type Natriuretic Peptide


  


  23956 pg/mL


(0-125)  H 


 


 


Total Protein


  


  6.0 G/DL


(6.4-8.2)  L 


 


 


Albumin


  


  2.1 G/DL


(3.4-5.0)  L 


 


 


Globulin  3.9 g/dL   


 


Albumin/Globulin Ratio


  


  0.5 (1.0-2.7)


L 


 


 


Triglycerides Level


  


  107 MG/DL


() 


 


 


Cholesterol Level


  


  50 MG/DL (<


200) 


 


 


LDL Cholesterol


  


  18 mg/dL


(<100) 


 


 


HDL Cholesterol


  


  18 MG/DL


(40-60)  L 


 


 


Cholesterol/HDL Ratio


  


  2.8 (3.3-4.4)


L 


 


 


Vitamin B12 Level


  


  402 PG/ML


(193-986) 


 


 


Folate


  


  22.5 NG/ML


(8.6-58.9) 


 


 


Thyroid Stimulating Hormone


(TSH) 


  0.050 uiU/mL


(0.358-3.740) 


 


 


Free Thyroxine


  


  1.95 NG/DL


(0.76-1.46)  H 


 


 


Free Triiodothyronine


  


  2.1 pg/mL


(2.3-4.2)  L 


 


 


Digoxin Level


  


  1.5 NG/ML


(0.5-2.0) 


 


 


Urine Eosinophils


  


  


  None seen


(NONE SEEN)








Objective





HEAD AND NECK:  Positive JVD.


LUNGS:  Decreased breath sounds and bibasilar rales.


CARDIOVASCULAR: Tachycardic, S1 and S2 with no gallop.


ABDOMEN:  Soft.


EXTREMITIES:  1+ pitting edema.











Tristen Malave MD Oct 9, 2019 11:02

## 2019-10-09 NOTE — NEPHROLOGY PROGRESS NOTE
Assessment/Plan


Problem List:  


(1) Renal failure (ARF), acute on chronic


(2) Atrial fibrillation with rapid ventricular response


(3) HTN (hypertension)


(4) Cardiomyopathy


Assessment





Renal failure- acute on chronic


CardioMyopathy and low EjFx - CHF


Anemia


HTN


DM / Nephropathy


Elevated troponin I


At Fib


BPH


Plan





Adjust BP meds-


Aim to reduce afterload


avoid nephrotoxics


slow diuresis


antibiotics


gastric support


urine studies


per orders





Subjective


ROS Limited/Unobtainable:  No


Constitutional:  Reports: malaise





Objective


Objective





Last 24 Hour Vital Signs








  Date Time  Temp Pulse Resp B/P (MAP) Pulse Ox O2 Delivery O2 Flow Rate FiO2


 


10/9/19 13:06    135/68    


 


10/9/19 12:00  72      


 


10/9/19 12:00 98.5 71 20 135/68 (90) 97   


 


10/9/19 08:54  68  130/65    


 


10/9/19 08:53    130/65    


 


10/9/19 08:00 98.7 77 19 130/65 (86) 98   


 


10/9/19 08:00  78      


 


10/9/19 07:55  86 20  97 Nasal Cannula 3.0 32


 


10/9/19 07:55     97 Nasal Cannula 3.0 32


 


10/9/19 05:57    127/62    


 


10/9/19 04:00  74      


 


10/9/19 04:00 98.0 75 18 131/66 (87) 98   


 


10/9/19 00:00  81      


 


10/9/19 00:00 97.6 78 18 110/56 (74) 96   


 


10/8/19 22:52    120/64    


 


10/8/19 21:22  90  114/46    


 


10/8/19 20:25  83 18  97 Nasal Cannula 2.0 28


 


10/8/19 20:05     97 Nasal Cannula 2.0 28


 


10/8/19 20:00  91      


 


10/8/19 20:00 98.1 90 18 114/46 (68) 95   


 


10/8/19 16:00  90      


 


10/8/19 16:00 97.2 96 18 135/68 (90) 96   


 


10/8/19 14:17    120/54    

















Intake and Output  


 


 10/8/19 10/9/19





 19:00 07:00


 


Intake Total  120 ml


 


Output Total  500 ml


 


Balance  -380 ml


 


  


 


Intake Oral  120 ml


 


Output Urine Total  500 ml


 


# Bowel Movements  1








Laboratory Tests


10/8/19 19:44: 


Sodium Level 141, Potassium Level 3.6, Chloride Level 107, Carbon Dioxide Level 

25, Anion Gap 9, Blood Urea Nitrogen 21H, Creatinine 2.0H, Estimat Glomerular 

Filtration Rate , Glucose Level 234H, Calcium Level 8.4L


10/9/19 05:39: 


Sodium Level 141, Potassium Level 4.0, Chloride Level 108H, Carbon Dioxide 

Level 24, Anion Gap 9, Blood Urea Nitrogen 24H, Creatinine 2.1H, Estimat 

Glomerular Filtration Rate , Glucose Level 172H, Calcium Level 8.3L, White 

Blood Count 15.0H, Red Blood Count 3.34L, Hemoglobin 9.4L, Hematocrit 29.1L, 

Mean Corpuscular Volume 87, Mean Corpuscular Hemoglobin 28.2, Mean Corpuscular 

Hemoglobin Concent 32.5, Red Cell Distribution Width 12.8, Platelet Count 256, 

Mean Platelet Volume 7.2, Neutrophils (%) (Auto) 78.4H, Lymphocytes (%) (Auto) 

12.2L, Monocytes (%) (Auto) 7.7, Eosinophils (%) (Auto) 1.3, Basophils (%) (Auto

) 0.4, Erythrocyte Sedimentation Rate 74H, Hemoglobin A1c 8.6H, Uric Acid 8.8H, 

Phosphorus Level 3.7, Magnesium Level 2.0, Iron Level 19L, Total Iron Binding 

Capacity 113L, Percent Iron Saturation 17, Unsaturated Iron Binding 94L, 

Ferritin 313, Total Bilirubin 0.4, Gamma Glutamyl Transpeptidase 198H, 

Aspartate Amino Transf (AST/SGOT) 22, Alanine Aminotransferase (ALT/SGPT) 24, 

Alkaline Phosphatase 163H, C-Reactive Protein, Quantitative 4.4H, Pro-B-Type 

Natriuretic Peptide 13500B, Total Protein 6.0L, Albumin 2.1L, Globulin 3.9, 

Albumin/Globulin Ratio 0.5L, Triglycerides Level 107, Cholesterol Level 50, LDL 

Cholesterol 18, HDL Cholesterol 18L, Cholesterol/HDL Ratio 2.8L, Vitamin B12 

Level 402, Folate 22.5, Thyroid Stimulating Hormone (TSH) 0.050L, Free 

Thyroxine 1.95H, Free Triiodothyronine 2.1L, Digoxin Level 1.5


10/9/19 05:45: Urine Eosinophils None seen


Height (Feet):  5


Height (Inches):  6.00


Weight (Pounds):  168


General Appearance:  no apparent distress


Cardiovascular:  normal rate


Respiratory/Chest:  decreased breath sounds


Abdomen:  distended











James Feliz MD Oct 9, 2019 13:42

## 2019-10-09 NOTE — NUR
HAND-OFF: 

Report given to BRANDON Tyson. Plan of care endorsed. Maintained NPO status since midnight 10/9.

## 2019-10-09 NOTE — DIAGNOSTIC IMAGING REPORT
Indication:  Abdominal pain

 

Technique: Grayscale and duplex Doppler imaging of the abdomen performed.

 

Comparison: None

 

Findings:

 

The liver is unremarkable. Doppler interrogation of the main portal vein shows

patency with hepatopedal, monophasic flow. There is no biliary ductal dilatation

identified. Gallbladder is absent. There are bilateral pleural effusions. There is a

right iliac stent noted. CBD measures 3 mm.

 

There demonstrated part of the pancreas, aorta and IVC show no definite

abnormalities.

 

Both kidneys appear unremarkable. There is no hydronephrosis.

 

IMPRESSION:

 

Status post cholecystectomy.

 

Bilateral pleural effusions

 

Right iliac stent

## 2019-10-09 NOTE — INFECTIOUS DISEASES PROG NOTE
Assessment/Plan


Assessment/Plan








ASSESSMENT AND PLAN:





1. sepsis, ? pna, chf, leukocytosis,k elevated lactic acid


    clostridium perfringens blood culture - + 1 bottle, ? pathogen/bacteremia, 

? contaminant 





   - augmentin x 4 days more for 10 day tx course 


   - f/u chest x-ray better 


   - monitor labs, check abdominal us since has nausea 


   - clinically improved, leukocytosis persists 


   - surveillance blood cultures negative 





2. Acute kidney injury.


3. Anemia.


4. Diabetes.


5. Hypertension.


6. Blood sugar and blood pressure treatment for diabetes and


hypertension per primary.


7. History of peripheral vascular disease.


8. 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


9. Possible hyperlipidemia.


10. CHF.


11. PAD.


12. History of stent.


13. CAD.


14. Hypoxia.


15. Continue treatment per primary consultants.


16. No known allergies.


17. Social history is positive for smoking in the past, but quit.


18. Family history is positive for diabetes.


19. MAR was noted.


20. Case discussed with RN.


21. Skin care protocol.


22. ICU care.





Subjective


Constitutional:  Denies: fever


HEENT:  Denies: congestion


Respiratory:  Denies: shortness of breath


Cardiovascular:  Denies: chest pain


Gastrointestinal/Abdominal:  Denies: nausea, vomiting, diarrhea


Genitourinary:  Reports: other - + benjamin


Neurologic:  Denies: headache


Psychiatric:  Denies: depression


Skin:  Denies: rash


Hematologic:  Denies: bleeding


Musculoskeletal:  Denies: pain


Allergies:  


Coded Allergies:  


     No Known Allergies (Unverified , 6/2/16)





Objective


Vital Signs





Last 24 Hour Vital Signs








  Date Time  Temp Pulse Resp B/P (MAP) Pulse Ox O2 Delivery O2 Flow Rate FiO2


 


10/9/19 16:00  74      


 


10/9/19 16:00 97.9 74 20 130/68 (88) 99   


 


10/9/19 14:22    135/68    


 


10/9/19 13:06    135/68    


 


10/9/19 12:00  72      


 


10/9/19 12:00 98.5 71 20 135/68 (90) 97   


 


10/9/19 08:54  68  130/65    


 


10/9/19 08:53    130/65    


 


10/9/19 08:00 98.7 77 19 130/65 (86) 98   


 


10/9/19 08:00  78      


 


10/9/19 07:55  86 20  97 Nasal Cannula 3.0 32


 


10/9/19 07:55     97 Nasal Cannula 3.0 32


 


10/9/19 05:57    127/62    


 


10/9/19 04:00  74      


 


10/9/19 04:00 98.0 75 18 131/66 (87) 98   


 


10/9/19 00:00  81      


 


10/9/19 00:00 97.6 78 18 110/56 (74) 96   


 


10/8/19 22:52    120/64    


 


10/8/19 21:22  90  114/46    


 


10/8/19 20:25  83 18  97 Nasal Cannula 2.0 28


 


10/8/19 20:05     97 Nasal Cannula 2.0 28


 


10/8/19 20:00  91      


 


10/8/19 20:00 98.1 90 18 114/46 (68) 95   








Height (Feet):  5


Height (Inches):  6.00


Weight (Pounds):  168


General Appearance:  no acute distress


HEENT:  normocephalic, atraumatic, anicteric, mucous membranes moist


Respiratory/Chest:  lungs clear, normal breath sounds, no respiratory distress


Cardiovascular:  normal rate, regular rhythm, no gallop/murmur, no JVD


Abdomen:  normal bowel sounds, soft, non tender, no organomegaly, non distended


Genitourinary:  other - no benjamin


Extremities:  no cyanosis


Skin:  no rash


Neurologic/Psychiatric:  CNs II-XII grossly normal, alert, oriented x 3, 

responsive


Lymphatic:  no neck adenopathy


Musculoskeletal:  no effusion


Objective








Chest x-ray - 10/6 - 





Upper abdomen: Normal.


 


IMPRESSION:   


  Decreased pulmonary edema.  Persistent bilateral pleural effusions with 


associated atelectasis versus pneumonia.  Probable mild pulmonary 


vasculature congestion.


 











Microbiology








 Date/Time


Source Procedure


Growth Status


 


 


 10/8/19 09:14


Sputum Gram Stain - Final Resulted


 


 10/8/19 09:14


Sputum Sputum Culture


Pending Resulted











Laboratory Tests








Test


  10/8/19


19:44 10/9/19


05:39 10/9/19


05:45


 


Sodium Level


  141 MMOL/L


(136-145) 141 MMOL/L


(136-145) 


 


 


Potassium Level


  3.6 MMOL/L


(3.5-5.1) 4.0 MMOL/L


(3.5-5.1) 


 


 


Chloride Level


  107 MMOL/L


() 108 MMOL/L


()  H 


 


 


Carbon Dioxide Level


  25 MMOL/L


(21-32) 24 MMOL/L


(21-32) 


 


 


Anion Gap


  9 mmol/L


(5-15) 9 mmol/L


(5-15) 


 


 


Blood Urea Nitrogen


  21 mg/dL


(7-18)  H 24 mg/dL


(7-18)  H 


 


 


Creatinine


  2.0 MG/DL


(0.55-1.30)  H 2.1 MG/DL


(0.55-1.30)  H 


 


 


Estimat Glomerular Filtration


Rate  mL/min (>60)  


   mL/min (>60)  


  


 


 


Glucose Level


  234 MG/DL


()  H 172 MG/DL


()  H 


 


 


Calcium Level


  8.4 MG/DL


(8.5-10.1)  L 8.3 MG/DL


(8.5-10.1)  L 


 


 


White Blood Count


  


  15.0 K/UL


(4.8-10.8)  H 


 


 


Red Blood Count


  


  3.34 M/UL


(4.70-6.10)  L 


 


 


Hemoglobin


  


  9.4 G/DL


(14.2-18.0)  L 


 


 


Hematocrit


  


  29.1 %


(42.0-52.0)  L 


 


 


Mean Corpuscular Volume  87 FL (80-99)   


 


Mean Corpuscular Hemoglobin


  


  28.2 PG


(27.0-31.0) 


 


 


Mean Corpuscular Hemoglobin


Concent 


  32.5 G/DL


(32.0-36.0) 


 


 


Red Cell Distribution Width


  


  12.8 %


(11.6-14.8) 


 


 


Platelet Count


  


  256 K/UL


(150-450) 


 


 


Mean Platelet Volume


  


  7.2 FL


(6.5-10.1) 


 


 


Neutrophils (%) (Auto)


  


  78.4 %


(45.0-75.0)  H 


 


 


Lymphocytes (%) (Auto)


  


  12.2 %


(20.0-45.0)  L 


 


 


Monocytes (%) (Auto)


  


  7.7 %


(1.0-10.0) 


 


 


Eosinophils (%) (Auto)


  


  1.3 %


(0.0-3.0) 


 


 


Basophils (%) (Auto)


  


  0.4 %


(0.0-2.0) 


 


 


Erythrocyte Sedimentation Rate


  


  74 MM/HR


(0-20)  H 


 


 


Hemoglobin A1c


  


  8.6 %


(4.3-6.0)  H 


 


 


Uric Acid


  


  8.8 MG/DL


(2.6-7.2)  H 


 


 


Phosphorus Level


  


  3.7 MG/DL


(2.5-4.9) 


 


 


Magnesium Level


  


  2.0 MG/DL


(1.8-2.4) 


 


 


Iron Level


  


  19 ug/dL


()  L 


 


 


Total Iron Binding Capacity


  


  113 ug/dL


(250-450)  L 


 


 


Percent Iron Saturation  17 % (15-50)   


 


Unsaturated Iron Binding


  


  94 ug/dL


(112-346)  L 


 


 


Ferritin


  


  313 NG/ML


(8-388) 


 


 


Total Bilirubin


  


  0.4 MG/DL


(0.2-1.0) 


 


 


Gamma Glutamyl Transpeptidase


  


  198 U/L (5-85)


H 


 


 


Aspartate Amino Transf


(AST/SGOT) 


  22 U/L (15-37)


  


 


 


Alanine Aminotransferase


(ALT/SGPT) 


  24 U/L (12-78)


  


 


 


Alkaline Phosphatase


  


  163 U/L


()  H 


 


 


C-Reactive Protein,


Quantitative 


  4.4 mg/dL


(0.00-0.90)  H 


 


 


Pro-B-Type Natriuretic Peptide


  


  95237 pg/mL


(0-125)  H 


 


 


Total Protein


  


  6.0 G/DL


(6.4-8.2)  L 


 


 


Albumin


  


  2.1 G/DL


(3.4-5.0)  L 


 


 


Globulin  3.9 g/dL   


 


Albumin/Globulin Ratio


  


  0.5 (1.0-2.7)


L 


 


 


Triglycerides Level


  


  107 MG/DL


() 


 


 


Cholesterol Level


  


  50 MG/DL (<


200) 


 


 


LDL Cholesterol


  


  18 mg/dL


(<100) 


 


 


HDL Cholesterol


  


  18 MG/DL


(40-60)  L 


 


 


Cholesterol/HDL Ratio


  


  2.8 (3.3-4.4)


L 


 


 


Lipase


  


  85 U/L


() 


 


 


Vitamin B12 Level


  


  402 PG/ML


(193-986) 


 


 


Folate


  


  22.5 NG/ML


(8.6-58.9) 


 


 


Thyroid Stimulating Hormone


(TSH) 


  0.050 uiU/mL


(0.358-3.740) 


 


 


Free Thyroxine


  


  1.95 NG/DL


(0.76-1.46)  H 


 


 


Free Triiodothyronine


  


  2.1 pg/mL


(2.3-4.2)  L 


 


 


Digoxin Level


  


  1.5 NG/ML


(0.5-2.0) 


 


 


Urine Eosinophils


  


  


  None seen


(NONE SEEN)











Current Medications








 Medications


  (Trade)  Dose


 Ordered  Sig/Sherly


 Route


 PRN Reason  Start Time


 Stop Time Status Last Admin


Dose Admin


 


 Acetaminophen


  (Tylenol)  500 mg  Q6H  PRN


 ORAL


 PAIN 1-6  10/7/19 22:15


 11/5/19 10:14   


 


 


 Al Hydroxide/Mg


 Hydroxide


  (Mylanta)  30 ml  Q6H  PRN


 ORAL


 nausea/acid reflux  10/8/19 12:00


 11/7/19 11:59   


 


 


 Amiodarone HCl


  (Cordarone)  400 mg  DAILY


 ORAL


   10/10/19 09:00


 11/6/19 20:59   


 


 


 Amoxicillin/


 Clavulanate


 Potassium


  (Augmentin)  500 mg  EVERY 12  HOURS


 ORAL


   10/8/19 21:00


 10/15/19 20:59  10/9/19 08:53


 


 


 Aspirin


  (ASA)  81 mg  DAILY


 ORAL


   10/8/19 09:00


 11/3/19 11:14  10/9/19 08:54


 


 


 Atorvastatin


 Calcium


  (Lipitor)  20 mg  BEDTIME


 ORAL


   10/7/19 21:00


 11/3/19 20:59  10/8/19 21:23


 


 


 Barium Sulfate


  (Readi-Cat 2)  450 ml  NOW  PRN


 ORAL


 Radiology Procedure  10/9/19 13:45


 10/11/19 13:38   


 


 


 Carvedilol


  (Coreg)  25 mg  EVERY 12  HOURS


 ORAL


   10/7/19 21:00


 11/6/19 20:59  10/9/19 08:54


 


 


 Clopidogrel


 Bisulfate


  (Plavix)  75 mg  DAILY


 ORAL


   10/8/19 09:00


 11/3/19 11:14  10/8/19 10:47


 


 


 Dextrose


  (Dextrose 50%)  25 ml  Q30M  PRN


 IV


 Hypoglycemia  10/8/19 20:30


 11/7/19 20:29   


 


 


 Dextrose


  (Dextrose 50%)  50 ml  Q30M  PRN


 IV


 Hypoglycemia  10/8/19 20:30


 11/7/19 20:29   


 


 


 Diatrizoate


 Meglum/


 Diatrizoate Sod


  (Gastrografin)  30 ml  NOW  PRN


 ORAL


 Radiology Procedure  10/9/19 13:45


 10/11/19 13:38   


 


 


 Docusate Sodium


  (Colace)  100 mg  TID


 ORAL


   10/9/19 18:00


 11/7/19 17:59   


 


 


 Finasteride


  (Proscar)  5 mg  DAILY


 ORAL


   10/8/19 09:00


 11/7/19 08:59  10/9/19 08:53


 


 


 Hydralazine HCl


  (Apresoline)  50 mg  Q8HR


 ORAL


   10/9/19 14:00


 11/7/19 13:59  10/9/19 14:22


 


 


 Insulin Aspart


  (NovoLOG)    BEFORE MEALS AND  HS


 SUBQ


   10/8/19 21:00


 11/7/19 20:59  10/8/19 21:27


 


 


 Insulin Detemir


  (Levemir)  15 units  BEDTIME


 SUBQ


   10/7/19 22:30


 11/3/19 22:29  10/8/19 21:26


 


 


 Insulin Detemir


  (Levemir)  25 units  DAILY


 SUBQ


   10/8/19 09:00


 11/3/19 11:14  10/8/19 11:26


 


 


 Isosorbide


 Mononitrate


  (Imdur)  60 mg  DAILY


 ORAL


   10/10/19 09:00


 11/7/19 08:59   


 


 


 Ondansetron HCl


  (Zofran ODT)  4 mg  Q6H  PRN


 ORAL


 Nausea & Vomiting  10/8/19 10:15


 11/7/19 10:14  10/8/19 17:55


 


 


 Pantoprazole


  (Protonix)  40 mg  EVERY 12  HOURS


 IVP


   10/8/19 21:00


 11/7/19 20:59  10/9/19 08:52


 


 


 Sucralfate


  (Carafate)  1 gm  FOUR TIMES A  DAY


 ORAL


   10/9/19 18:00


 11/8/19 17:59   


 


 


 Tamsulosin HCl


  (Flomax)  0.4 mg  BID


 ORAL


   10/8/19 18:00


 11/5/19 20:59  10/9/19 08:54


 


 


 Tramadol HCl


  (Ultram)  50 mg  Q6H  PRN


 ORAL


 Severe Pain (Pain Scale 7-10)  10/7/19 22:15


 10/13/19 10:14   


 

















Eve Milan MD Oct 9, 2019 19:15

## 2019-10-09 NOTE — NUR
NURSE NOTES:

Received phone call from Dr. Malave and endorsed  and house supervisor from 
[facility] involved in patient transfer. Per Dr. Malave, he will follow up.

## 2019-10-10 VITALS — SYSTOLIC BLOOD PRESSURE: 117 MMHG | DIASTOLIC BLOOD PRESSURE: 56 MMHG

## 2019-10-10 VITALS — SYSTOLIC BLOOD PRESSURE: 127 MMHG | DIASTOLIC BLOOD PRESSURE: 62 MMHG

## 2019-10-10 VITALS — DIASTOLIC BLOOD PRESSURE: 51 MMHG | SYSTOLIC BLOOD PRESSURE: 118 MMHG

## 2019-10-10 VITALS — DIASTOLIC BLOOD PRESSURE: 54 MMHG | SYSTOLIC BLOOD PRESSURE: 101 MMHG

## 2019-10-10 VITALS — SYSTOLIC BLOOD PRESSURE: 138 MMHG | DIASTOLIC BLOOD PRESSURE: 65 MMHG

## 2019-10-10 LAB
ADD MANUAL DIFF: NO
ALBUMIN SERPL-MCNC: 2.2 G/DL (ref 3.4–5)
ALBUMIN/GLOB SERPL: 0.6 {RATIO} (ref 1–2.7)
ALP SERPL-CCNC: 158 U/L (ref 46–116)
ALT SERPL-CCNC: 23 U/L (ref 12–78)
ANION GAP SERPL CALC-SCNC: 8 MMOL/L (ref 5–15)
AST SERPL-CCNC: 19 U/L (ref 15–37)
BASOPHILS NFR BLD AUTO: 0.5 % (ref 0–2)
BILIRUB SERPL-MCNC: 0.4 MG/DL (ref 0.2–1)
BUN SERPL-MCNC: 32 MG/DL (ref 7–18)
CALCIUM SERPL-MCNC: 8.5 MG/DL (ref 8.5–10.1)
CHLORIDE SERPL-SCNC: 108 MMOL/L (ref 98–107)
CO2 SERPL-SCNC: 25 MMOL/L (ref 21–32)
CREAT SERPL-MCNC: 2.1 MG/DL (ref 0.55–1.3)
EOSINOPHIL NFR BLD AUTO: 2 % (ref 0–3)
ERYTHROCYTE [DISTWIDTH] IN BLOOD BY AUTOMATED COUNT: 12.9 % (ref 11.6–14.8)
GLOBULIN SER-MCNC: 4 G/DL
HCT VFR BLD CALC: 29.6 % (ref 42–52)
HGB BLD-MCNC: 9.8 G/DL (ref 14.2–18)
LYMPHOCYTES NFR BLD AUTO: 13.6 % (ref 20–45)
MCV RBC AUTO: 87 FL (ref 80–99)
MONOCYTES NFR BLD AUTO: 8.2 % (ref 1–10)
NEUTROPHILS NFR BLD AUTO: 75.8 % (ref 45–75)
PHOSPHATE SERPL-MCNC: 3.7 MG/DL (ref 2.5–4.9)
PLATELET # BLD: 271 K/UL (ref 150–450)
POTASSIUM SERPL-SCNC: 4.4 MMOL/L (ref 3.5–5.1)
RBC # BLD AUTO: 3.41 M/UL (ref 4.7–6.1)
SODIUM SERPL-SCNC: 141 MMOL/L (ref 136–145)
WBC # BLD AUTO: 12.4 K/UL (ref 4.8–10.8)

## 2019-10-10 RX ADMIN — INSULIN ASPART SCH UNITS: 100 INJECTION, SOLUTION INTRAVENOUS; SUBCUTANEOUS at 17:50

## 2019-10-10 RX ADMIN — INSULIN ASPART SCH UNITS: 100 INJECTION, SOLUTION INTRAVENOUS; SUBCUTANEOUS at 06:23

## 2019-10-10 RX ADMIN — CARVEDILOL SCH MG: 25 TABLET, FILM COATED ORAL at 09:09

## 2019-10-10 RX ADMIN — TAMSULOSIN HYDROCHLORIDE SCH MG: 0.4 CAPSULE ORAL at 17:45

## 2019-10-10 RX ADMIN — ASPIRIN 81 MG SCH MG: 81 TABLET ORAL at 09:09

## 2019-10-10 RX ADMIN — SUCRALFATE SCH GM: 1 TABLET ORAL at 09:09

## 2019-10-10 RX ADMIN — DOCUSATE SODIUM SCH MG: 100 CAPSULE, LIQUID FILLED ORAL at 09:08

## 2019-10-10 RX ADMIN — PANTOPRAZOLE SODIUM SCH MG: 40 INJECTION, POWDER, FOR SOLUTION INTRAVENOUS at 09:10

## 2019-10-10 RX ADMIN — TAMSULOSIN HYDROCHLORIDE SCH MG: 0.4 CAPSULE ORAL at 09:10

## 2019-10-10 RX ADMIN — INSULIN ASPART SCH UNITS: 100 INJECTION, SOLUTION INTRAVENOUS; SUBCUTANEOUS at 11:30

## 2019-10-10 RX ADMIN — HYDRALAZINE HYDROCHLORIDE SCH MG: 50 TABLET ORAL at 06:21

## 2019-10-10 RX ADMIN — DOCUSATE SODIUM SCH MG: 100 CAPSULE, LIQUID FILLED ORAL at 13:38

## 2019-10-10 RX ADMIN — DOCUSATE SODIUM SCH MG: 100 CAPSULE, LIQUID FILLED ORAL at 17:45

## 2019-10-10 RX ADMIN — SUCRALFATE SCH GM: 1 TABLET ORAL at 17:45

## 2019-10-10 RX ADMIN — SUCRALFATE SCH GM: 1 TABLET ORAL at 13:37

## 2019-10-10 RX ADMIN — HYDRALAZINE HYDROCHLORIDE SCH MG: 50 TABLET ORAL at 13:37

## 2019-10-10 RX ADMIN — INSULIN DETEMIR SCH UNITS: 100 INJECTION, SOLUTION SUBCUTANEOUS at 09:20

## 2019-10-10 NOTE — NUR
NURSE NOTES:

Received report from Vianney/RN, Patient is awake, Lying semi-tran's, resting 
comfortably, no acute distress/SOB noted. Able to make needs known. IV on right FA, and Left 
AC, Patent, no infiltration or bleeding noted. Bed in low position and locked, Bed alarm is 
on, side rails up x2. Call light within reach. Will continue plan of care.

## 2019-10-10 NOTE — CARDIAC ELECTROPHYSIOLOGY PN
Assessment/Plan


Assessment/Plan


1. CHF exacerbation with EF 30-35%. On Lasix 40 iv bid, Hydralazine 10 bid, 

Imdur 30 daily and Aldactone 25 daily.


     Awaiting to transfer for [facility] for cardiac cath. Awaiting prior Echo report. 

If EF less than 35% for 3 months will need ICD implant





2. Atrial fib with RVR 160s. On Amiodarone 400 daily, Dig 0.125 daily and Coreg 

25 bid. Held Eliquis for cardiac cath


 


3. Uncontrolled diabetes, on insulin.


4. Severe peripheral vascular disease, status post left second toe gangrene 

amputation for osteomyelitis followed by Podiatry and Vascular


Surgery.On Aspirin and Plavix





ERIKA RN and Dr Gallagher


Transfer to [facility] at [place] for cardiac cath


 Patient agreeable to cardiac cath





Subjective


Subjective


Remained in SR. No CP. EF 30-35%. No VT overnight 


Awaiting insurance authorization to transfer to [facility] for cardiac cath





Objective





Last 24 Hour Vital Signs








  Date Time  Temp Pulse Resp B/P (MAP) Pulse Ox O2 Delivery O2 Flow Rate FiO2


 


10/10/19 09:10    138/65    


 


10/10/19 09:09  78  138/65    


 


10/10/19 08:05  78 18  94 Nasal Cannula 2.0 28


 


10/10/19 08:05     94 Nasal Cannula 2.0 28


 


10/10/19 08:00 97.5 76 20 138/65 (89) 97   


 


10/10/19 06:21    127/62    


 


10/10/19 04:00  73      


 


10/10/19 04:00 97.7 75 18 127/62 (83) 97   


 


10/10/19 00:00  78      


 


10/10/19 00:00 98.1 74 18 101/54 (70) 97   


 


10/9/19 21:22    134/67    


 


10/9/19 21:21  82  134/67    


 


10/9/19 20:02  81 20  99 Nasal Cannula 3.0 32


 


10/9/19 20:02     99 Nasal Cannula 3.0 32


 


10/9/19 20:00  80      


 


10/9/19 20:00 97.9 82 20 134/67 (89) 99   


 


10/9/19 16:00  74      


 


10/9/19 16:00 97.9 74 20 130/68 (88) 99   


 


10/9/19 14:22    135/68    


 


10/9/19 13:06    135/68    


 


10/9/19 12:00  72      


 


10/9/19 12:00 98.5 71 20 135/68 (90) 97   

















Intake and Output  


 


 10/9/19 10/10/19





 18:59 06:59


 


Intake Total  120 ml


 


Output Total  300 ml


 


Balance  -180 ml


 


  


 


Intake Oral  120 ml


 


Output Urine Total  300 ml


 


# Bowel Movements  1











Laboratory Tests








Test


  10/10/19


04:20 10/10/19


05:50


 


Urine Eosinophils


  None seen


(NONE SEEN) 


 


 


White Blood Count


  


  12.4 K/UL


(4.8-10.8)  H


 


Red Blood Count


  


  3.41 M/UL


(4.70-6.10)  L


 


Hemoglobin


  


  9.8 G/DL


(14.2-18.0)  L


 


Hematocrit


  


  29.6 %


(42.0-52.0)  L


 


Mean Corpuscular Volume  87 FL (80-99)  


 


Mean Corpuscular Hemoglobin


  


  28.6 PG


(27.0-31.0)


 


Mean Corpuscular Hemoglobin


Concent 


  33.0 G/DL


(32.0-36.0)


 


Red Cell Distribution Width


  


  12.9 %


(11.6-14.8)


 


Platelet Count


  


  271 K/UL


(150-450)


 


Mean Platelet Volume


  


  7.3 FL


(6.5-10.1)


 


Neutrophils (%) (Auto)


  


  75.8 %


(45.0-75.0)  H


 


Lymphocytes (%) (Auto)


  


  13.6 %


(20.0-45.0)  L


 


Monocytes (%) (Auto)


  


  8.2 %


(1.0-10.0)


 


Eosinophils (%) (Auto)


  


  2.0 %


(0.0-3.0)


 


Basophils (%) (Auto)


  


  0.5 %


(0.0-2.0)


 


Sodium Level


  


  141 MMOL/L


(136-145)


 


Potassium Level


  


  4.4 MMOL/L


(3.5-5.1)


 


Chloride Level


  


  108 MMOL/L


()  H


 


Carbon Dioxide Level


  


  25 MMOL/L


(21-32)


 


Anion Gap


  


  8 mmol/L


(5-15)


 


Blood Urea Nitrogen


  


  32 mg/dL


(7-18)  H


 


Creatinine


  


  2.1 MG/DL


(0.55-1.30)  H


 


Estimat Glomerular Filtration


Rate 


   mL/min (>60)  


 


 


Glucose Level


  


  151 MG/DL


()  H


 


Uric Acid


  


  9.2 MG/DL


(2.6-7.2)  H


 


Calcium Level


  


  8.5 MG/DL


(8.5-10.1)


 


Phosphorus Level


  


  3.7 MG/DL


(2.5-4.9)


 


Magnesium Level


  


  2.3 MG/DL


(1.8-2.4)


 


Total Bilirubin


  


  0.4 MG/DL


(0.2-1.0)


 


Aspartate Amino Transf


(AST/SGOT) 


  19 U/L (15-37)


 


 


Alanine Aminotransferase


(ALT/SGPT) 


  23 U/L (12-78)


 


 


Alkaline Phosphatase


  


  158 U/L


()  H


 


Troponin I


  


  0.048 ng/mL


(0.000-0.056)


 


C-Reactive Protein,


Quantitative 


  3.4 mg/dL


(0.00-0.90)  H


 


Pro-B-Type Natriuretic Peptide


  


  6338 pg/mL


(0-125)  H


 


Total Protein


  


  6.2 G/DL


(6.4-8.2)  L


 


Albumin


  


  2.2 G/DL


(3.4-5.0)  L


 


Globulin  4.0 g/dL  


 


Albumin/Globulin Ratio


  


  0.6 (1.0-2.7)


L


 


Thyroid Stimulating


Immunoglobulin 


  Pending  


 


 


Thyroglobulin Antibody  Pending  











Microbiology








 Date/Time


Source Procedure


Growth Status


 


 


 10/8/19 09:14


Sputum Gram Stain - Final Resulted


 


 10/8/19 09:14 Sputum Culture - Preliminary


YEAST


Usual Respiratory Kyra Resulted








Objective





HEAD AND NECK:  Positive JVD.


LUNGS:  Decreased breath sounds and bibasilar rales.


CARDIOVASCULAR: Tachycardic, S1 and S2 with no gallop.


ABDOMEN:  Soft.


EXTREMITIES:  1+ pitting edema.











Tristen Malave MD Oct 10, 2019 09:49

## 2019-10-10 NOTE — NUR
NURSE NOTES:

Resting throughout the night. No significant change of condition noted. Will continue to 
monitor.

## 2019-10-10 NOTE — CONSULTATION
History of Present Illness


General


Date patient seen:  Oct 10, 2019


Reason for Hospitalization:  Abnormal Labs





Present Illness


HPI


74 year old male with a PMHx Dm2, HTN, CHF, PAD s/p left iliac stent placement 

10/25/19 and supsequent left 2nd toe amputation for gangrene/osteomyelitis, CAD 

who presented with increased SOB x 5 days. Increased orthopnea and lower 

extremity swelling. Dry cough. no fevers or chills. Also with diarrhea, watery 

10x per day for past two weeks. No history of MI or heart failure. No history 

of coronary stents. 


Of note patient recently hospitalized last month for L second toe foul smelling 

necrosis, gangrene and osteomyelitis. Was continued on vancomycin, cefepime and 

flagyl until patient underwent LLE angiogram and left iliac? stent on 

approximately 9/25/19. Subsequently had left toe amputated. He does not endorse 

any pain, redness or drainage from the toe. Denies any other rashes, lesions 

ulcers.  


During this admission complaining of abdominal pain.  Ultrasound identified no 

significant findings CT scan as below.  Surgery called to evaluate and assist 

with care.  Patient seen, patient evaluated, chart reviewed


Allergies:  


Coded Allergies:  


     No Known Allergies (Unverified , 6/2/16)





Medication History


Scheduled


Amlodipine Besylate (Norvasc), 5 MG ORAL DAILY


Aspirin* (Aspirin*), 81 MG ORAL DAILY


Atorvastatin Calcium* (Lipitor*), 20 MG ORAL BEDTIME


Clopidogrel* (Clopidogrel*), 75 MG ORAL DAILY, (Reported)


Metoprolol Tartrate* (Metoprolol Tartrate*), 100 MG ORAL BEDTIME


Omeprazole (Omeprazole), 40 MG ORAL DAILY, (Reported)





Miscellaneous Medications


Bismuth Subsalicylate (Pepto-Bismol), 262 MG PO, (Reported)





Discontinued Medications


Acetaminophen* (Acetaminophen 325MG Tablet*), 650 MG ORAL Q4H PRN


   Discontinued Reason: Therapy completed


Cefepime Hcl/D5w (Cefepime-Dextrose 2 Gm/50 Ml), 2 GM IVPB Q24H, (Reported)


   Discontinued Reason: Therapy completed


Cefepime Hcl/Dextrose, Iso-Osm (Cefepime 2 Gm Injection), 2 GM IV DAILY


   Discontinued Reason: Therapy completed


Chlorhexidine Gluconate* (Hibiclens*), 1 APPLIC TOPIC DAILY@2000


   Discontinued Reason: Therapy completed


Metronidazole* (Flagyl*), 500 MG ORAL EVERY 8 HOURS


   Discontinued Reason: Therapy completed


Metronidazole* (Flagyl*), 500 MG ORAL EVERY 8 HOURS, (Reported)


   Discontinued Reason: Therapy completed


Nph, Human Insulin Isophane* (Novolin N*), 20 UNITS SUBQ BEFORE DINNER


   Discontinued Reason: Therapy completed


Nph, Human Insulin Isophane* (Novolin N*), 40 UNITS SUBQ BEFORE BREAKFAST


   Discontinued Reason: Therapy completed


Ondansetron Odt* (Zofran Odt*), 4 MG BC EVERY 8 HOURS


   Discontinued Reason: Therapy completed


Pantoprazole* (Pantoprazole*), 40 MG ORAL DAILY


   Discontinued Reason: Therapy completed


Polyethylene Glycol 3350 (Miralax), 17 GM ORAL HSPRN PRN


   Discontinued Reason: Therapy completed


Vancomycin HCl in Water (Vancomycin 1,250 mg/12.5 ml Vl), 1.25 GM IV DAILY


   Discontinued Reason: Therapy completed


Vancomycin HCl in Water (Vancomycin 1,250 mg/12.5 ml Vl), 1.25 GM IV DAILY, (

Reported)


   Discontinued Reason: Therapy completed


Vancomycin Hcl (Vancomycin Hcl), 125 MG IVPB DAILY, (Reported)


   Discontinued Reason: Therapy completed


[D50w], 50 ML IV Q30M PRN


   Discontinued Reason: Therapy completed


[D50w], 25 ML IV Q30M PRN


   Discontinued Reason: Therapy completed





Patient History


History Provided By:  Patient, Family Member, Medical Record, PMD


Healthcare decision maker





Resuscitation status


Full Code


Advanced Directive on File








Past Medical/Surgical History


Past Medical/Surgical History:  


(1) Cholelithiasis


(2) Biliary colic


(3) Dehydration


(4) Fever


(5) Renal insufficiency


(6) Cholecystitis with cholelithiasis


(7) Elevated LFTs


(8) Bladder outlet obstruction


(9) Anemia, unspecified


(10) Sepsis


(11) ACS (acute coronary syndrome)


(12) Hyperglycemia due to type 1 diabetes mellitus


(13) Cellulitis in diabetic foot


(14) MERLYN (acute kidney injury)


(15) Hypoxemia


(16) HTN (hypertension)


(17) DM (diabetes mellitus)


(18) Acute exacerbation of CHF (congestive heart failure)


(19) Atrial fibrillation with rapid ventricular response


(20) Gram positive bacterial infection


(21) Acute kidney injury superimposed on CKD


(22) Renal failure (ARF), acute on chronic


(23) Cardiomyopathy


(24) Hyperthyroidism


(25) Cecal diverticulitis





Review of Systems


Review of Symptoms


General ROS: no weight loss or fever


Psychological ROS: no depression or mood changes, no memory loss


Ophthalmic ROS: no visual changes or eye irritation


ENT ROS: no nasal congestion, hearing loss, dizziness


Allergy and Immunology ROS: no allergic symptoms or urticaria


Hematological and Lymphatic ROS: no swollen glands, unusual bleeding or bruising


Endocrine ROS: no polyuria, polydipsia, weight changes, temperature intolerance


Respiratory ROS: no cough, shortness of breath, or wheezing


Cardiovascular ROS: no chest pain or dyspnea on exertion


Gastrointestinal ROS: abdominal pain, bright red blood in stool.


Musculoskeletal ROS: no myalgias or arthralgias


Neurological ROS: no TIA or stroke symptoms


Dermatological ROS: no new or changing skin lesions, rashes or pruritis





Physical Exam


Physical Exam


General appearance:  alert, cooperative, no distress, appears stated age


Head:  Normocephalic, without obvious abnormality, atraumatic


Eyes:  conjunctivae/corneas clear. PERRL, EOM's intact. Fundi benign


Throat:  Lips, mucosa, and tongue normal. Teeth and gums normal


Neck:  supple, symmetrical, trachea midline, no adenopathy, thyroid: not 

enlarged, symmetric, no tenderness/mass/nodules, no carotid bruit and no JVD


Lungs:  clear to auscultation bilaterally


Heart:  regular rate and rhythm, S1, S2 normal, no murmur, click, rub or gallop


Abdomen:  soft, non-tender. Bowel sounds normal. No masses,  no organomegaly


Extremities:  extremities normal, atraumatic, no cyanosis or edema


Pulses:  2+ and symmetric


Skin:  Skin color, texture, turgor normal. No rashes or lesions


Neurologic:  Grossly normal





Last 24 Hour Vital Signs








  Date Time  Temp Pulse Resp B/P (MAP) Pulse Ox O2 Delivery O2 Flow Rate FiO2


 


10/10/19 13:37    118/51    


 


10/10/19 12:00 97.8 69 22 118/51 (73) 96   


 


10/10/19 12:00  69      


 


10/10/19 09:10    138/65    


 


10/10/19 09:09  78  138/65    


 


10/10/19 08:05  78 18  94 Nasal Cannula 2.0 28


 


10/10/19 08:05     94 Nasal Cannula 2.0 28


 


10/10/19 08:00  76      


 


10/10/19 08:00 97.5 76 20 138/65 (89) 97   


 


10/10/19 06:21    127/62    


 


10/10/19 04:00  73      


 


10/10/19 04:00 97.7 75 18 127/62 (83) 97   


 


10/10/19 00:00  78      


 


10/10/19 00:00 98.1 74 18 101/54 (70) 97   


 


10/9/19 21:22    134/67    


 


10/9/19 21:21  82  134/67    


 


10/9/19 20:02  81 20  99 Nasal Cannula 3.0 32


 


10/9/19 20:02     99 Nasal Cannula 3.0 32


 


10/9/19 20:00  80      


 


10/9/19 20:00 97.9 82 20 134/67 (89) 99   


 


10/9/19 16:00  74      


 


10/9/19 16:00 97.9 74 20 130/68 (88) 99   


 


10/9/19 14:22    135/68    

















Intake and Output  


 


 10/9/19 10/10/19





 19:00 07:00


 


Intake Total  120 ml


 


Output Total  300 ml


 


Balance  -180 ml


 


  


 


Intake Oral  120 ml


 


Output Urine Total  300 ml


 


# Bowel Movements  1











Laboratory Tests








Test


  10/10/19


04:20 10/10/19


05:50


 


Urine Eosinophils


  None seen


(NONE SEEN) 


 


 


White Blood Count


  


  12.4 K/UL


(4.8-10.8)  H


 


Red Blood Count


  


  3.41 M/UL


(4.70-6.10)  L


 


Hemoglobin


  


  9.8 G/DL


(14.2-18.0)  L


 


Hematocrit


  


  29.6 %


(42.0-52.0)  L


 


Mean Corpuscular Volume  87 FL (80-99)  


 


Mean Corpuscular Hemoglobin


  


  28.6 PG


(27.0-31.0)


 


Mean Corpuscular Hemoglobin


Concent 


  33.0 G/DL


(32.0-36.0)


 


Red Cell Distribution Width


  


  12.9 %


(11.6-14.8)


 


Platelet Count


  


  271 K/UL


(150-450)


 


Mean Platelet Volume


  


  7.3 FL


(6.5-10.1)


 


Neutrophils (%) (Auto)


  


  75.8 %


(45.0-75.0)  H


 


Lymphocytes (%) (Auto)


  


  13.6 %


(20.0-45.0)  L


 


Monocytes (%) (Auto)


  


  8.2 %


(1.0-10.0)


 


Eosinophils (%) (Auto)


  


  2.0 %


(0.0-3.0)


 


Basophils (%) (Auto)


  


  0.5 %


(0.0-2.0)


 


Sodium Level


  


  141 MMOL/L


(136-145)


 


Potassium Level


  


  4.4 MMOL/L


(3.5-5.1)


 


Chloride Level


  


  108 MMOL/L


()  H


 


Carbon Dioxide Level


  


  25 MMOL/L


(21-32)


 


Anion Gap


  


  8 mmol/L


(5-15)


 


Blood Urea Nitrogen


  


  32 mg/dL


(7-18)  H


 


Creatinine


  


  2.1 MG/DL


(0.55-1.30)  H


 


Estimat Glomerular Filtration


Rate 


   mL/min (>60)  


 


 


Glucose Level


  


  151 MG/DL


()  H


 


Uric Acid


  


  9.2 MG/DL


(2.6-7.2)  H


 


Calcium Level


  


  8.5 MG/DL


(8.5-10.1)


 


Phosphorus Level


  


  3.7 MG/DL


(2.5-4.9)


 


Magnesium Level


  


  2.3 MG/DL


(1.8-2.4)


 


Total Bilirubin


  


  0.4 MG/DL


(0.2-1.0)


 


Aspartate Amino Transf


(AST/SGOT) 


  19 U/L (15-37)


 


 


Alanine Aminotransferase


(ALT/SGPT) 


  23 U/L (12-78)


 


 


Alkaline Phosphatase


  


  158 U/L


()  H


 


Troponin I


  


  0.048 ng/mL


(0.000-0.056)


 


C-Reactive Protein,


Quantitative 


  3.4 mg/dL


(0.00-0.90)  H


 


Pro-B-Type Natriuretic Peptide


  


  6338 pg/mL


(0-125)  H


 


Total Protein


  


  6.2 G/DL


(6.4-8.2)  L


 


Albumin


  


  2.2 G/DL


(3.4-5.0)  L


 


Globulin  4.0 g/dL  


 


Albumin/Globulin Ratio


  


  0.6 (1.0-2.7)


L


 


Thyroid Stimulating


Immunoglobulin 


  Pending  


 


 


Thyroglobulin Antibody  Pending  








Height (Feet):  5


Height (Inches):  6.00


Weight (Pounds):  169


Medications





Current Medications








 Medications


  (Trade)  Dose


 Ordered  Sig/Sherly


 Route


 PRN Reason  Start Time


 Stop Time Status Last Admin


Dose Admin


 


 Acetaminophen


  (Tylenol)  500 mg  Q6H  PRN


 ORAL


 PAIN 1-6  10/7/19 22:15


 11/5/19 10:14   


 


 


 Al Hydroxide/Mg


 Hydroxide


  (Mylanta)  30 ml  Q6H  PRN


 ORAL


 nausea/acid reflux  10/8/19 12:00


 11/7/19 11:59   


 


 


 Amiodarone HCl


  (Cordarone)  400 mg  DAILY


 ORAL


   10/10/19 09:00


 11/6/19 20:59  10/10/19 09:08


 


 


 Amoxicillin/


 Clavulanate


 Potassium


  (Augmentin)  500 mg  EVERY 12  HOURS


 ORAL


   10/8/19 21:00


 10/15/19 20:59  10/10/19 09:07


 


 


 Aspirin


  (ASA)  81 mg  DAILY


 ORAL


   10/8/19 09:00


 11/3/19 11:14  10/10/19 09:09


 


 


 Atorvastatin


 Calcium


  (Lipitor)  20 mg  BEDTIME


 ORAL


   10/7/19 21:00


 11/3/19 20:59  10/9/19 21:21


 


 


 Barium Sulfate


  (Readi-Cat 2)  450 ml  NOW  PRN


 ORAL


 Radiology Procedure  10/9/19 13:45


 10/11/19 13:38   


 


 


 Carvedilol


  (Coreg)  25 mg  EVERY 12  HOURS


 ORAL


   10/7/19 21:00


 11/6/19 20:59  10/10/19 09:09


 


 


 Clopidogrel


 Bisulfate


  (Plavix)  75 mg  DAILY


 ORAL


   10/8/19 09:00


 11/3/19 11:14  10/10/19 09:10


 


 


 Dextrose


  (Dextrose 50%)  25 ml  Q30M  PRN


 IV


 Hypoglycemia  10/8/19 20:30


 11/7/19 20:29   


 


 


 Dextrose


  (Dextrose 50%)  50 ml  Q30M  PRN


 IV


 Hypoglycemia  10/8/19 20:30


 11/7/19 20:29   


 


 


 Diatrizoate


 Meglum/


 Diatrizoate Sod


  (Gastrografin)  30 ml  NOW  PRN


 ORAL


 Radiology Procedure  10/9/19 13:45


 10/11/19 13:38   


 


 


 Docusate Sodium


  (Colace)  100 mg  TID


 ORAL


   10/9/19 18:00


 11/7/19 17:59  10/10/19 13:38


 


 


 Finasteride


  (Proscar)  5 mg  DAILY


 ORAL


   10/8/19 09:00


 11/7/19 08:59  10/10/19 09:09


 


 


 Furosemide


  (Lasix)  40 mg  DAILY


 ORAL


   10/10/19 14:00


 11/9/19 13:59   


 


 


 Hydralazine HCl


  (Apresoline)  50 mg  Q8HR


 ORAL


   10/9/19 14:00


 11/7/19 13:59  10/10/19 13:37


 


 


 Insulin Aspart


  (NovoLOG)    BEFORE MEALS AND  HS


 SUBQ


   10/8/19 21:00


 11/7/19 20:59  10/10/19 06:23


 


 


 Insulin Detemir


  (Levemir)  15 units  BEDTIME


 SUBQ


   10/7/19 22:30


 11/3/19 22:29  10/9/19 21:29


 


 


 Insulin Detemir


  (Levemir)  25 units  DAILY


 SUBQ


   10/8/19 09:00


 11/3/19 11:14  10/10/19 09:20


 


 


 Isosorbide


 Mononitrate


  (Imdur)  60 mg  DAILY


 ORAL


   10/10/19 09:00


 11/7/19 08:59  10/10/19 09:10


 


 


 Methimazole


  (Tapazole)  20 mg  DAILY


 ORAL


   10/10/19 09:00


 11/9/19 08:59  10/10/19 09:08


 


 


 Nateglinide


  (Starlix)  120 mg  TIAC


 ORAL


   10/10/19 06:30


 11/9/19 06:29  10/10/19 11:53


 


 


 Ondansetron HCl


  (Zofran ODT)  4 mg  Q6H  PRN


 ORAL


 Nausea & Vomiting  10/8/19 10:15


 11/7/19 10:14  10/8/19 17:55


 


 


 Pantoprazole


  (Protonix)  40 mg  DAILY


 ORAL


   10/11/19 09:00


 11/10/19 08:59   


 


 


 Sucralfate


  (Carafate)  1 gm  FOUR TIMES A  DAY


 ORAL


   10/9/19 18:00


 11/8/19 17:59  10/10/19 13:37


 


 


 Tamsulosin HCl


  (Flomax)  0.4 mg  BID


 ORAL


   10/8/19 18:00


 11/5/19 20:59  10/10/19 09:10


 


 


 Tramadol HCl


  (Ultram)  50 mg  Q6H  PRN


 ORAL


 Severe Pain (Pain Scale 7-10)  10/7/19 22:15


 10/13/19 10:14   


 











Assessment/Plan


Problem List:  


(1) Abdominal pain


Assessment & Plan:  The appendix is prominent in caliber, but caliber is 

similar to the


previous exam and there is gas within the bulbous tip. There is soft tissue 

stranding


along the right paracolic gutter which was not evident previously. No definite 

colon


wall thickening is evident, however. There is no evidence of colonic 

diverticulosis


or diverticulitis. No small bowel distention or small bowel wall thickening. 

There is


trace fluid within the pelvis. No free intraperitoneal gas is evident. The 

distal


esophagus, stomach, duodenum are unremarkable.


 


Lack of IV contrast limits assessment of the solid organs. The gallbladder has 

been


removed in the interim. There is no biliary ductal dilatation. The liver, 

pancreas,


spleen, adrenals are unremarkable. Again demonstrated is fairly extensive 

bilateral


perinephric fat stranding. No renal or ureteral calculi, hydronephrosis, or


hydroureter. There is a An catheter present within the bladder, which is


nondistended. Small amount of air within the bladder lumen is presumably 

related to


the An catheterization. The prostate is somewhat prominent with 

calcifications.


This is also evident previously.


 


There is diffuse edema of the subcutaneous fat, slightly increased from the 

prior


study. There are now bilateral large pleural effusions. These result in 

compressive


atelectatic changes at both lung bases. No acute process otherwise interim 

placement


of a right common iliac artery stent. Patency is indeterminate due to the lack 

of IV


contrast.


 


The bones demonstrate fairly extensive degenerative proliferative changes of the


lumbar spine.


 


Impression: Nonspecific soft tissue stranding adjacent to the right colon, 

without


other evidence of colonic inflammatory change. Could represent diverticulitis 

related


to occult diverticula disease, among other possibilities


 


No definite acute process otherwise


 


Bilateral large pleural effusions. Compressive atelectatic changes of both lung 

bases


 


Interim cholecystectomy


 


Trace free pelvic fluid, significance/etiology uncertain


 


Nonspecific bilateral perinephric fat stranding, also evident previously


 


Diffuse edema of the subcutaneous fat, increased from previous exam





--No acute surgical intervention planned


okay for diet


IV Abx


will follow with recs


thank you


ICD Codes:  R10.9 - Unspecified abdominal pain


SNOMED:  [id number]











Jose AntoniodanyelleroderickBella de leónya Oct 10, 2019 14:22

## 2019-10-10 NOTE — NUR
*-* INSURANCE *-*



ALL CLINICALS AND REVIEWS HAVE BEEN FAXED TO:



[facility]

TRK# [id number]

F: [phone]

CARE COORDINATOR: JP CAMACHO

P: [phone]

## 2019-10-10 NOTE — NUR
NURSE NOTES:

Ambulance personnel is here to transfer patient to [facility], Cardiac monitor removed, No acute 
distress/SOB noted. Vital signs are in normal limits. Transfer package and belonging given 
to ambulance personnel.

## 2019-10-10 NOTE — NUR
NOTES







SPOKE WITH CHLOÉ FROM [facility], FOLLOWED UP WITH TRANSFER AUTHORIZATION FOR CARDIAC CATH. 
AUTHORIZATION GIVEN FOR TRANSPORTATION HOWEVER CHLOÉ HAS TO CALL [facility] FOR THE 
AUTHORIZATION. WILL FOLLOW UP WITH AUTH.

-------------------------------------------------------------------------------

Addendum: 10/10/19 at 1125 by LEDY BRASWELL RN RN

-------------------------------------------------------------------------------

RECEIVED A CALL FROM CHLOÉ UNABLE TO GIVE AUTHORIZATION UNTIL HE CAN CONFIRM IF [facility] IS CONTRACTED WITH ALIGNMENT. CHLOÉ NEEDS NPI NUMBER OF [facility]. PROVIDED CHLOÉ 
WITH THE HOUSE SUPERVISOR NUMBER AT [facility] TO GET NPI NUMBER. PLACED A CALL TO [facility] SPOKE WITH FIGUEROA MADE AWARE OF NEEDED NPI NUMBER TO PROCEED WITH TRANSFER, PROVIDED 
CHLOÉ MARTI'S TELEPHONE NUMBER. WILL FOLLOW UP.

-------------------------------------------------------------------------------

Addendum: 10/10/19 at 1737 by LEDY BRASWELL RN RN

-------------------------------------------------------------------------------

PT ACCEPTED TO [facility]OM 240 BED A. NURSE TO CALL REPORT [phone] EXT [phone]. LIFE 
LINE TO TRANSPORT PT WITH AUTH NUMBER [id number]. MD MADE AWARE OF PT TRANSFERRING.

## 2019-10-10 NOTE — GENERAL PROGRESS NOTE
Assessment/Plan


Problem List:  


(1) HTN (hypertension)


ICD Codes:  I10 - Essential (primary) hypertension


SNOMED:  66566181


(2) DM (diabetes mellitus)


ICD Codes:  E11.9 - Type 2 diabetes mellitus without complications


SNOMED:  11866386


(3) Acute exacerbation of CHF (congestive heart failure)


ICD Codes:  I50.9 - Heart failure, unspecified


SNOMED:  929226264, 30820934011790


Qualifiers:  


   Qualified Codes:  I50.9 - Heart failure, unspecified


(4) Atrial fibrillation with rapid ventricular response


ICD Codes:  I48.91 - Unspecified atrial fibrillation


SNOMED:  066637959677776


(5) Hypoxemia


ICD Codes:  R09.02 - Hypoxemia


SNOMED:  874470666


(6) MERLYN (acute kidney injury)


ICD Codes:  N17.9 - Acute kidney failure, unspecified


SNOMED:  38819497, 3965792


(7) Cellulitis in diabetic foot


ICD Codes:  E11.628 - Type 2 diabetes mellitus with other skin complications; 

L03.119 - Cellulitis of unspecified part of limb


SNOMED:  380138343, 10233868


(8) Sepsis


ICD Codes:  A41.9 - Sepsis, unspecified organism


SNOMED:  66157691, 40151965


Qualifiers:  


   Qualified Codes:  A41.9 - Sepsis, unspecified organism; R65.20 - Severe 

sepsis without septic shock; J96.01 - Acute respiratory failure with hypoxia


(9) Cecal diverticulitis


ICD Codes:  K57.32 - Diverticulitis of large intestine without perforation or 

abscess without bleeding


SNOMED:  722751995


(10) Hyperthyroidism


ICD Codes:  E05.90 - Thyrotoxicosis, unspecified without thyrotoxic crisis or 

storm


SNOMED:  07909027


Status:  stable


Assessment/Plan:


This is a 74 year old male with a PMHx Dm2, HTN, CHF, PAD s/p left iliac stent 

placement 10/25/19 and subsequent left 2nd toe amputation for gangrene/

osteomyelitis, CAD who presents with acute hypoxic respiratory failure 2/2 CHF 

exacerbation and sepsis. Also with MERLYN, new onset atrial fibrillation. .Also 

found to have cecal diverticulitis and hyperthyroidism, both presumably present 

on admission





#Cecal diverticulitis, present on admission. Patient did not endorse any 

abdominal pain on admission and was nontender on exam.  However after 4 days in 

the hospital the patient began to experience more pain, WBC increased, and 

became more tender on exam


#sepsis (WBC, heart rate, RR) 2/2 diverticulitis vs. pneumonia, resolved


#Clostridium perfingens in 1/2 blood culture- likely contaminant per ID


> 6 the patient has had abdominal pain for the past 6 days.  CT abdomen pelvis 

showed cecal diverticulitis.


> abdominal Ultrasound negative: S/p cholecystectomy


> C diff negative


> ESR 74, CRP 1.4, unlikely osteomyelitis


-continue Protonix daily- appears to help symptoms


-Appreciate GI consult: Dr. Ga


-Appreciated surgery consult: Dr. Meléndez - no surgical intervention


-Check a stool occult blood


-Continue Augmentin


-Appreciate ID recommendations: Dr. Bergman





#hyperthyroidism, new diagnosis


> TSH 0.05, T4 1.95


- Appreciate Endocrinology consult: Dr. Alexander


-Per endocrinology patient started on methimazole


-follow up on anti-TSI and anti thyroid ab





#acute hypoxic respiratory failure 2/2 systolic CHF exacerbation (new diagnosis

) and possible pneumonia- no known history of CHF. Also treating for possible 

pneumonia - Slightly worsened today.  Will resume diuretics


- telemetry


- titrate SpO2 to keep O2 >92%


- f/u blood cultures. 1/4 sets clostridium perfingens. Likely contaminant per 

ID. Surveilance cultures negative to date


- f/u sputum culture


- s/p Vancomycin, Cefepime and Flagyl


- Change to Augmentin per ID - Day 7. will need a total of 10 days


- Resume Lasix 40 mg p.o. daily


- Continue afterload reduction with hydralazine and Imdur per cardiology and 

nephrology


Patient- ACE-I contraindicated in setting of acute renal failure


- Per cardiology given low EF will need ischemia workup. Per cardiology, an 

extensive discussion was performed at bedside using a  area, 

to discuss the need for an ischemia workup including a cardiac angiogram.  The 

patient initially declined any further cardiac intervention but now agrees. 

Pending insurance authorization for transfer to [facility] for cariac 

cath. 


- Informed by outside hospital to keep patient NPO for possible procedure today


- appreciate Cardiology recommendations: Dr. Malave


- Appreciate Infectious Disease recommendations: Dr. Bergman





#Atrial fibrillation with RVR, new diagnosis. - IMPROVINg, 


CHADS-VASC 5 - risk of stroke 7.2% per year


HASBLED - 3 - risk of bleeding 5.8% per year


> Explained to patient risks and benefits of anticoagulation with Asa/Plavix 

and an anticoagulant. Patient understands the risks of stroke are > than risk 

of bleeding and would like to take the anticoagulant. Will start patient on 

eliquis renal dosing 2.5mg PO BID


- s/p Diltiazem Gtt


- Coreg and digoxin for heart rate controlled


- holding eliquis for now due to pending cardiac cath


- appreicate cardiology recommendations





#acute urinary retention, new problem


> UA and urine culture negative


- Inserted benjamin on 10/6/10


- Tamsulosin 0.4mg PO daily


- Finasteride


- Appreciate urology recommendations: Dr. Jiang.


- Per urology continue benjamin on discharge. Follow up as outpatient for voiding 

trial





#Acute on Chronic Renal failure 2/2 CHF exacerbation and urinary obstruction - 

Stabilizing. Not back to baseline yet


> Baseline Cr 1.5


> Renal ultrasound negative


- diuresis as above


- continue to trend 


- avoid nephrotoxins


- replace lytes


- Appreciate nephrology consultation: Dr. Shah





#Hypertension


#hyperlipidemia


#CAD


-hold home amlodipine


- continue Coreg as above


- ASA/Plavix as above





#hypokalemia


#hypomagnesemia


- replete


- continue to monitor with labs





 #lactic acidosis suspected 2/2 CHF exacerbation vs. sepsis - RESOLVED


- trended down to 1


- management as above





#S/p left 2nd toe amputation due to gangrene/osteomyelitis


#s/p recent left iliac stent on ASA/Plavix 10 days ago.


> ESR 73, CRP 1.4


- wound consult


- request records from [facility]





#Diabetes mellitus type 1 per patient (suspect this is an error and has type 2) 

with hyperglycemia-  improved


> Hemoglobin A1C 8.6 on 10/6/10


- accuchecks Q6hr


- Diabetic diet


- Levemir 25 units QAM and 15 units QPM (home is 25 and 30)


- SSI, average


- nateglinide 120mg TIDAC per endocrinology recommendations





FENPPx


DVT PPx: eliquis on hold, SCDs


GI PPX: protonix


Fluids: none


Diet: Diabetic diet


Lines: none


PT/OT: pending





Dispo: To OSH for cardiac cath 





D/w RN, patient, Cardiology, Pulmonology, Gastroenterology, Surgery. I spent 41 

minutes on this encounter. >50% spent on counseling and care coordination. 





Time of note may not reflect time patient was seen





Subjective


Date patient seen:  Oct 10, 2019


Constitutional:  Denies: chills, diaphoresis, fever, malaise, weakness, other


HEENT:  Denies: eye pain, blurred vision, tearing, double vision, ear pain, ear 

discharge, nose pain, nose congestion, throat pain, throat swelling, mouth pain

, mouth swelling, other


Cardiovascular:  Denies: chest pain, edema, irregular heart rate, 

lightheadedness, palpitations, syncope, other


Respiratory:  Reports: orthopnea, shortness of breath, SOB with excertion; 

Denies: cough, SOB at rest, sputum, stridor, wheezing, other


Gastrointestinal/Abdominal:  Reports: abdominal pain, nausea; Denies: abdomen 

distended, black stools, tarry stools, blood in stool, constipated, diarrhea, 

difficulty swallowing, poor appetite, poor fluid intake, rectal bleeding, 

vomiting, other


Genitourinary:  Denies: burning, discharge, frequency, flank pain, hematuria, 

incontinence, pain, urgency, other


Neurologic/Psychiatric:  Denies: anxiety, depressed, emotional problems, 

headache, numbness, paresthesia, pre-existing deficit, seizure, tingling, 

tremors, weakness, other


Endocrine:  Denies: excessive sweating, flushing, intolerance to cold, 

intolerance to heat, increased hunger, increased thirst, increased urine, 

unexplained weight gain, unexplained weight loss, other


Hematologic/Lymphatic:  Denies: anemia, easy bleeding, easy bruising, other


Allergies:  


Coded Allergies:  


     No Known Allergies (Unverified , 6/2/16)


Subjective


No acute events overnight per nursing. 





Cecal diverticulit: CT abdomen pelvis with oral contrast was positive for cecal 

diverticulitis.  The appendix looked normal.  The patient was seen by general 

surgery and no surgical intervention was recommended.  Patient continues to 

endorse abdominal pain mostly in the right lower and left lower quadrant.  He 

has had no hematochezia, melena, vomiting.  He does endorse some nausea.


Today the patient endorses epigastric abdominal pain, it is postprandial and 

radiates to right and left lower abdominal quadrants.  He rates it as a 5 out 

of 10 and lasts for 1 hour.  It is associated with nausea but no vomiting.  The 

patient also endorses noticing black stools for the past 3 days.  He denies any 

fevers chills back pain chest pain.  She endorses that his shortness of breath 

is improved.





Afib: Heart rate is better controlled the patient is tolerating the medications 

well.  He denies any palpitations or syncope.


Pneumonia/CHF: Patient's diuretics have been held for the past 2 days due to 

worsening renal function and he endorses worsening shortness of breath and 

orthopnea.  he is now on 2 L of nasal cannula.  He denies any chest pain cough 

fevers chills.





Objective





Last 24 Hour Vital Signs








  Date Time  Temp Pulse Resp B/P (MAP) Pulse Ox O2 Delivery O2 Flow Rate FiO2


 


10/10/19 13:37    118/51    


 


10/10/19 12:00 97.8 69 22 118/51 (73) 96   


 


10/10/19 12:00  69      


 


10/10/19 09:10    138/65    


 


10/10/19 09:09  78  138/65    


 


10/10/19 08:05  78 18  94 Nasal Cannula 2.0 28


 


10/10/19 08:05     94 Nasal Cannula 2.0 28


 


10/10/19 08:00  76      


 


10/10/19 08:00 97.5 76 20 138/65 (89) 97   


 


10/10/19 06:21    127/62    


 


10/10/19 04:00  73      


 


10/10/19 04:00 97.7 75 18 127/62 (83) 97   


 


10/10/19 00:00  78      


 


10/10/19 00:00 98.1 74 18 101/54 (70) 97   


 


10/9/19 21:22    134/67    


 


10/9/19 21:21  82  134/67    


 


10/9/19 20:02  81 20  99 Nasal Cannula 3.0 32


 


10/9/19 20:02     99 Nasal Cannula 3.0 32


 


10/9/19 20:00  80      


 


10/9/19 20:00 97.9 82 20 134/67 (89) 99   


 


10/9/19 16:00  74      


 


10/9/19 16:00 97.9 74 20 130/68 (88) 99   


 


10/9/19 14:22    135/68    





proBNP 6338.  Previously 11,882


Uric acid 9.2


CT abdomen pelvis: Cecal diverticulitis, right coronary stent seen











Intake and Output  


 


 10/9/19 10/10/19





 19:00 07:00


 


Intake Total  120 ml


 


Output Total  300 ml


 


Balance  -180 ml


 


  


 


Intake Oral  120 ml


 


Output Urine Total  300 ml


 


# Bowel Movements  1








Laboratory Tests


10/10/19 04:20: Urine Eosinophils None seen


10/10/19 05:50: 


White Blood Count 12.4H, Red Blood Count 3.41L, Hemoglobin 9.8L, Hematocrit 

29.6L, Mean Corpuscular Volume 87, Mean Corpuscular Hemoglobin 28.6, Mean 

Corpuscular Hemoglobin Concent 33.0, Red Cell Distribution Width 12.9, Platelet 

Count 271, Mean Platelet Volume 7.3, Neutrophils (%) (Auto) 75.8H, Lymphocytes (

%) (Auto) 13.6L, Monocytes (%) (Auto) 8.2, Eosinophils (%) (Auto) 2.0, 

Basophils (%) (Auto) 0.5, Sodium Level 141, Potassium Level 4.4, Chloride Level 

108H, Carbon Dioxide Level 25, Anion Gap 8, Blood Urea Nitrogen 32H, Creatinine 

2.1H, Estimat Glomerular Filtration Rate , Glucose Level 151H, Uric Acid 9.2H, 

Calcium Level 8.5, Phosphorus Level 3.7, Magnesium Level 2.3, Total Bilirubin 

0.4, Aspartate Amino Transf (AST/SGOT) 19, Alanine Aminotransferase (ALT/SGPT) 

23, Alkaline Phosphatase 158H, Troponin I 0.048, C-Reactive Protein, 

Quantitative 3.4H, Pro-B-Type Natriuretic Peptide 6338H, Total Protein 6.2L, 

Albumin 2.2L, Globulin 4.0, Albumin/Globulin Ratio 0.6L, Thyroid Stimulating 

Immunoglobulin [Pending], Thyroglobulin Antibody [Pending]


Height (Feet):  5


Height (Inches):  6.00


Weight (Pounds):  169


General Appearance:  WD/WN, no apparent distress, alert


EENT:  PERRL/EOMI


Neck:  non-tender, normal alignment, supple


Cardiovascular:  normal peripheral pulses, normal rate, other - irregular rhythm


Respiratory/Chest:  other - bibasilar crackles, no wheezing


Abdomen:  normal bowel sounds, soft, other - tenderness to palpation diffusely, 

without rebound


Extremities:  normal range of motion, non-tender


Neurologic:  CNs II-XII grossly normal, no motor/sensory deficits, alert, 

oriented x 3


Skin:  normal pigmentation, warm/dry











Reynaldo Schaffer D.O. Oct 10, 2019 14:05

## 2019-10-10 NOTE — NUR
NURSE NOTES:

Called [facility] [place] [phone] to give nurse to nurse report, No answer, 
No answering machine.

## 2019-10-10 NOTE — UROLOGY PROGRESS NOTE
Assessment/Plan


Status:  stable


Assessment/Plan:


1. Urinary retention.


2. Benign prostatic hypertrophy.


3. Probable neurogenic bladder.


4. Renal insufficiency, acute versus chronic.


5. Renal cyst.


6. Rule out nephrolithiasis.


7. Hematuria.


8. UTI or colonized urine.





monitor clinically


maintain benjamin for now


monitor renal fxn


benjamin secured to leg


hand irrigated and do PRN


cont flomax and proscar


abx as ordered


f/u on blood cx


voiding trial later


consider adding antifungals





Subjective


Allergies:  


Coded Allergies:  


     No Known Allergies (Unverified , 6/2/16)


Subjective


all noted, awaiting transfer to [facility] for cardiac cath





Objective





Last 24 Hour Vital Signs








  Date Time  Temp Pulse Resp B/P (MAP) Pulse Ox O2 Delivery O2 Flow Rate FiO2


 


10/10/19 08:05  78 18  94 Nasal Cannula 2.0 28


 


10/10/19 08:05     94 Nasal Cannula 2.0 28


 


10/10/19 08:00 97.5 76 20 138/65 (89) 97   


 


10/10/19 06:21    127/62    


 


10/10/19 04:00  73      


 


10/10/19 04:00 97.7 75 18 127/62 (83) 97   


 


10/10/19 00:00  78      


 


10/10/19 00:00 98.1 74 18 101/54 (70) 97   


 


10/9/19 21:22    134/67    


 


10/9/19 21:21  82  134/67    


 


10/9/19 20:02  81 20  99 Nasal Cannula 3.0 32


 


10/9/19 20:02     99 Nasal Cannula 3.0 32


 


10/9/19 20:00  80      


 


10/9/19 20:00 97.9 82 20 134/67 (89) 99   


 


10/9/19 16:00  74      


 


10/9/19 16:00 97.9 74 20 130/68 (88) 99   


 


10/9/19 14:22    135/68    


 


10/9/19 13:06    135/68    


 


10/9/19 12:00  72      


 


10/9/19 12:00 98.5 71 20 135/68 (90) 97   

















Intake and Output  


 


 10/9/19 10/10/19





 18:59 06:59


 


Intake Total  120 ml


 


Output Total  300 ml


 


Balance  -180 ml


 


  


 


Intake Oral  120 ml


 


Output Urine Total  300 ml


 


# Bowel Movements  1











Microbiology








 Date/Time


Source Procedure


Growth Status


 


 


 10/5/19 14:35


Blood Blood Culture - Preliminary


NO GROWTH AFTER 4 DAYS Resulted





 10/8/19 09:14


Sputum Gram Stain - Final Resulted


 


 10/8/19 09:14


Sputum Sputum Culture


Pending Resulted


 


 10/6/19 09:50


Straight Cath Urine Culture - Final


YEAST Complete


 


 10/4/19 04:15


Rectum - Final


NO CARBAPENEM-RESISTANT ENTEROBACTERI... Complete








Current Medications








 Medications


  (Trade)  Dose


 Ordered  Sig/Sherly


 Route


 PRN Reason  Start Time


 Stop Time Status Last Admin


Dose Admin


 


 Acetaminophen


  (Tylenol)  500 mg  Q6H  PRN


 ORAL


 PAIN 1-6  10/7/19 22:15


 11/5/19 10:14   


 


 


 Al Hydroxide/Mg


 Hydroxide


  (Mylanta)  30 ml  Q6H  PRN


 ORAL


 nausea/acid reflux  10/8/19 12:00


 11/7/19 11:59   


 


 


 Amiodarone HCl


  (Cordarone)  400 mg  DAILY


 ORAL


   10/10/19 09:00


 11/6/19 20:59   


 


 


 Amoxicillin/


 Clavulanate


 Potassium


  (Augmentin)  500 mg  EVERY 12  HOURS


 ORAL


   10/8/19 21:00


 10/15/19 20:59  10/9/19 21:21


 


 


 Aspirin


  (ASA)  81 mg  DAILY


 ORAL


   10/8/19 09:00


 11/3/19 11:14  10/9/19 08:54


 


 


 Atorvastatin


 Calcium


  (Lipitor)  20 mg  BEDTIME


 ORAL


   10/7/19 21:00


 11/3/19 20:59  10/9/19 21:21


 


 


 Barium Sulfate


  (Readi-Cat 2)  450 ml  NOW  PRN


 ORAL


 Radiology Procedure  10/9/19 13:45


 10/11/19 13:38   


 


 


 Carvedilol


  (Coreg)  25 mg  EVERY 12  HOURS


 ORAL


   10/7/19 21:00


 11/6/19 20:59  10/9/19 21:21


 


 


 Clopidogrel


 Bisulfate


  (Plavix)  75 mg  DAILY


 ORAL


   10/8/19 09:00


 11/3/19 11:14  10/8/19 10:47


 


 


 Dextrose


  (Dextrose 50%)  25 ml  Q30M  PRN


 IV


 Hypoglycemia  10/8/19 20:30


 11/7/19 20:29   


 


 


 Dextrose


  (Dextrose 50%)  50 ml  Q30M  PRN


 IV


 Hypoglycemia  10/8/19 20:30


 11/7/19 20:29   


 


 


 Diatrizoate


 Meglum/


 Diatrizoate Sod


  (Gastrografin)  30 ml  NOW  PRN


 ORAL


 Radiology Procedure  10/9/19 13:45


 10/11/19 13:38   


 


 


 Docusate Sodium


  (Colace)  100 mg  TID


 ORAL


   10/9/19 18:00


 11/7/19 17:59   


 


 


 Finasteride


  (Proscar)  5 mg  DAILY


 ORAL


   10/8/19 09:00


 11/7/19 08:59  10/9/19 08:53


 


 


 Hydralazine HCl


  (Apresoline)  50 mg  Q8HR


 ORAL


   10/9/19 14:00


 11/7/19 13:59  10/10/19 06:21


 


 


 Insulin Aspart


  (NovoLOG)    BEFORE MEALS AND  HS


 SUBQ


   10/8/19 21:00


 11/7/19 20:59  10/10/19 06:23


 


 


 Insulin Detemir


  (Levemir)  15 units  BEDTIME


 SUBQ


   10/7/19 22:30


 11/3/19 22:29  10/9/19 21:29


 


 


 Insulin Detemir


  (Levemir)  25 units  DAILY


 SUBQ


   10/8/19 09:00


 11/3/19 11:14  10/8/19 11:26


 


 


 Isosorbide


 Mononitrate


  (Imdur)  60 mg  DAILY


 ORAL


   10/10/19 09:00


 11/7/19 08:59   


 


 


 Methimazole


  (Tapazole)  20 mg  DAILY


 ORAL


   10/10/19 09:00


 11/9/19 08:59   


 


 


 Nateglinide


  (Starlix)  120 mg  TIAC


 ORAL


   10/10/19 06:30


 11/9/19 06:29  10/10/19 06:21


 


 


 Ondansetron HCl


  (Zofran ODT)  4 mg  Q6H  PRN


 ORAL


 Nausea & Vomiting  10/8/19 10:15


 11/7/19 10:14  10/8/19 17:55


 


 


 Pantoprazole


  (Protonix)  40 mg  EVERY 12  HOURS


 IVP


   10/8/19 21:00


 11/7/19 20:59  10/9/19 21:22


 


 


 Sucralfate


  (Carafate)  1 gm  FOUR TIMES A  DAY


 ORAL


   10/9/19 18:00


 11/8/19 17:59  10/9/19 21:20


 


 


 Tamsulosin HCl


  (Flomax)  0.4 mg  BID


 ORAL


   10/8/19 18:00


 11/5/19 20:59  10/9/19 08:54


 


 


 Tramadol HCl


  (Ultram)  50 mg  Q6H  PRN


 ORAL


 Severe Pain (Pain Scale 7-10)  10/7/19 22:15


 10/13/19 10:14   


 





Laboratory Tests


10/10/19 04:20: Urine Eosinophils None seen


10/10/19 05:50: 


White Blood Count 12.4H, Red Blood Count 3.41L, Hemoglobin 9.8L, Hematocrit 

29.6L, Mean Corpuscular Volume 87, Mean Corpuscular Hemoglobin 28.6, Mean 

Corpuscular Hemoglobin Concent 33.0, Red Cell Distribution Width 12.9, Platelet 

Count 271, Mean Platelet Volume 7.3, Neutrophils (%) (Auto) 75.8H, Lymphocytes (

%) (Auto) 13.6L, Monocytes (%) (Auto) 8.2, Eosinophils (%) (Auto) 2.0, 

Basophils (%) (Auto) 0.5, Sodium Level 141, Potassium Level 4.4, Chloride Level 

108H, Carbon Dioxide Level 25, Anion Gap 8, Blood Urea Nitrogen 32H, Creatinine 

2.1H, Estimat Glomerular Filtration Rate , Glucose Level 151H, Uric Acid 9.2H, 

Calcium Level 8.5, Phosphorus Level 3.7, Magnesium Level 2.3, Total Bilirubin 

0.4, Aspartate Amino Transf (AST/SGOT) 19, Alanine Aminotransferase (ALT/SGPT) 

23, Alkaline Phosphatase 158H, Troponin I 0.048, C-Reactive Protein, 

Quantitative 3.4H, Pro-B-Type Natriuretic Peptide 6338H, Total Protein 6.2L, 

Albumin 2.2L, Globulin 4.0, Albumin/Globulin Ratio 0.6L, Thyroid Stimulating 

Immunoglobulin [Pending], Thyroglobulin Antibody [Pending]


Height (Feet):  5


Height (Inches):  6.00


Weight (Pounds):  169


Objective


 exam stable


benjamin indwelling with christen urine











Apolinar Fraser MD Oct 10, 2019 09:07

## 2019-10-10 NOTE — CONSULTATION
DATE OF CONSULTATION:  10/10/2019

ENDOCRINOLOGY CONSULTATION



CONSULTING PHYSICIAN:  Wolfgang Alexander M.D.



REFERRING PHYSICIAN:  Sue Hall M.D.



REASON FOR CONSULTATION:

1. Hyperthyroidism.

2. Diabetes, out of control.



HISTORY OF PRESENT ILLNESS:  The patient is a 74-year-old male with history

of diabetes and without any history of hyperthyroidism, who presented to

the hospital with shortness of breath x5 days, increased orthopnea, and

lower extremity swelling.  The patient was diagnosed with CHF exacerbation

as well as new onset atrial fibrillation, on amiodarone.  Prior to the

start, the patient's thyroid function was ordered and thyroid function was

obtained and was noted to have a suppressed TSH and elevated T4.

Endocrinology was consulted.



PAST MEDICAL HISTORY:

1. Diabetes, on insulin.

2. Hypertension.

3. CHF.

4. PAD.

5. Coronary artery disease.



PAST SURGICAL HISTORY:

1. Left toe amputation.

2. Cholecystectomy.



FAMILY HISTORY:  Mother with type 2 diabetes.



SOCIAL HISTORY:  Ex-smoker and ex-drinker.  No drugs.



ALLERGIES TO MEDICATION:  None.



MEDICATIONS:  Reviewed and reconciled.



REVIEW OF SYSTEMS:  A 12-point review of systems was performed and the

pertinent positives and negatives are mentioned in history of present

illness.



LABORATORY DATA:  Sodium 141, potassium 4, chloride 108, bicarbonate 24,

BUN 24, creatinine 2.1, glucose of 172.  Lactic acid is 2.4.  Hemoglobin

The patient's TSH of 0.0, free T4 of 1.9, free T3 of 2.1.



PHYSICAL EXAMINATION:

GENERAL:  He is awake and alert.

VITAL SIGNS:  Blood pressure is 127/72, pulse of 75, temperature of 97.7,

respiratory rate 18.

HEENT:  Pupils are reactive to light.  Sclerae are anicteric.

NECK:  No JVD.  No thyromegaly.

HEART:  Irregular.

LUNGS:  Clear.

ABDOMEN:  Positive bowel sounds.

EXTREMITIES:  No clubbing, cyanosis.  Positive for toe gangrene.



DIAGNOSES:

1. CHF exacerbation.

2. Atrial fibrillation.

3. Hyperthyroidism.

4. Diabetes, out of control.

5. CKD.



PLAN:

1. Check thyroid antibodies, rule out Graves disease.

2. Start methimazole 20 mg daily.

3. The patient is currently not on amiodarone, which may exacerbate the

thyroid function and amiodarone has a high load of iodine and in the

context of hyperthyroidism and it will probably exacerbate

the hyperthyroidism.

4. We will discuss with cardiology for possible change of this medication.

5. Continue Levemir as is.

6. Continue NovoLog sliding scale before meals and at bedtime.

7. Add Starlix 120 mg before each meal.

8. Further adjustment according to glucose values.  I will follow during

the hospital stay.



Thank you, Dr. Hall courtesy of this consultation.









  ______________________________________________

  Wolfgang Alexander M.D.





DR:  RN/PSM

D:  10/10/2019 06:06

T:  10/10/2019 16:21

JOB#:  [id number]

CC:



ANDREZ

## 2019-10-10 NOTE — PULMONOLOGY PROGRESS NOTE
Assessment/Plan


Problems:  


(1) Atrial fibrillation with rapid ventricular response


(2) Acute exacerbation of CHF (congestive heart failure)


(3) Acute kidney injury superimposed on CKD


(4) Gram positive bacterial infection


(5) DM (diabetes mellitus)


(6) HTN (hypertension)


(7) Hypoxemia


Assessment/Plan


Optimize pulmonary hygiene/mobilize as tolerated


PRN O2


Abx (PO Augmentin) per ID


F/U CARDS/EPS recs ---> PO MTP, Eliquis (held) ---> plan to transfer for cath


F/U GI and surgery recs


Monitor volumes and renal function, renal recs, diuretics  


Glycemic control


Aspiration precautions


DVT Px: Eliquis + SCD's


FC


Awaiting transfer to OSH for C





Subjective


Allergies:  


Coded Allergies:  


     No Known Allergies (Unverified , 6/2/16)


Subjective


AFVSS in SR


O2 needs stable


no cough less SOB no CP no FC


CT with possible diverticulitis


Large b effusions noted as well





Objective





Last 24 Hour Vital Signs








  Date Time  Temp Pulse Resp B/P (MAP) Pulse Ox O2 Delivery O2 Flow Rate FiO2


 


10/10/19 13:37    118/51    


 


10/10/19 12:00 97.8 69 22 118/51 (73) 96   


 


10/10/19 12:00  69      


 


10/10/19 09:10    138/65    


 


10/10/19 09:09  78  138/65    


 


10/10/19 08:05  78 18  94 Nasal Cannula 2.0 28


 


10/10/19 08:05     94 Nasal Cannula 2.0 28


 


10/10/19 08:00  76      


 


10/10/19 08:00 97.5 76 20 138/65 (89) 97   


 


10/10/19 06:21    127/62    


 


10/10/19 04:00  73      


 


10/10/19 04:00 97.7 75 18 127/62 (83) 97   


 


10/10/19 00:00  78      


 


10/10/19 00:00 98.1 74 18 101/54 (70) 97   


 


10/9/19 21:22    134/67    


 


10/9/19 21:21  82  134/67    


 


10/9/19 20:02  81 20  99 Nasal Cannula 3.0 32


 


10/9/19 20:02     99 Nasal Cannula 3.0 32


 


10/9/19 20:00  80      


 


10/9/19 20:00 97.9 82 20 134/67 (89) 99   


 


10/9/19 16:00  74      


 


10/9/19 16:00 97.9 74 20 130/68 (88) 99   

















Intake and Output  


 


 10/9/19 10/10/19





 19:00 07:00


 


Intake Total  120 ml


 


Output Total  300 ml


 


Balance  -180 ml


 


  


 


Intake Oral  120 ml


 


Output Urine Total  300 ml


 


# Bowel Movements  1








General Appearance:  no acute distress, cachetic


HEENT:  normocephalic, atraumatic, anicteric, mucous membranes moist


Respiratory/Chest:  chest wall non-tender, crackles/rales


Cardiovascular:  normal peripheral pulses, normal rate, regular rhythm


Abdomen:  normal bowel sounds, soft, non tender, no organomegaly, non distended

, no mass


Extremities:  no cyanosis, no clubbing, no edema





Microbiology








 Date/Time


Source Procedure


Growth Status


 


 


 10/8/19 09:14


Sputum Gram Stain - Final Resulted


 


 10/8/19 09:14 Sputum Culture - Preliminary


YEAST


Usual Respiratory Kyra Resulted








Laboratory Tests


10/10/19 04:20: Urine Eosinophils None seen


10/10/19 05:50: 


White Blood Count 12.4H, Red Blood Count 3.41L, Hemoglobin 9.8L, Hematocrit 

29.6L, Mean Corpuscular Volume 87, Mean Corpuscular Hemoglobin 28.6, Mean 

Corpuscular Hemoglobin Concent 33.0, Red Cell Distribution Width 12.9, Platelet 

Count 271, Mean Platelet Volume 7.3, Neutrophils (%) (Auto) 75.8H, Lymphocytes (

%) (Auto) 13.6L, Monocytes (%) (Auto) 8.2, Eosinophils (%) (Auto) 2.0, 

Basophils (%) (Auto) 0.5, Sodium Level 141, Potassium Level 4.4, Chloride Level 

108H, Carbon Dioxide Level 25, Anion Gap 8, Blood Urea Nitrogen 32H, Creatinine 

2.1H, Estimat Glomerular Filtration Rate , Glucose Level 151H, Uric Acid 9.2H, 

Calcium Level 8.5, Phosphorus Level 3.7, Magnesium Level 2.3, Total Bilirubin 

0.4, Aspartate Amino Transf (AST/SGOT) 19, Alanine Aminotransferase (ALT/SGPT) 

23, Alkaline Phosphatase 158H, Troponin I 0.048, C-Reactive Protein, 

Quantitative 3.4H, Pro-B-Type Natriuretic Peptide 6338H, Total Protein 6.2L, 

Albumin 2.2L, Globulin 4.0, Albumin/Globulin Ratio 0.6L, Thyroid Stimulating 

Immunoglobulin [Pending], Thyroglobulin Antibody [Pending]





Current Medications








 Medications


  (Trade)  Dose


 Ordered  Sig/Sherly


 Route


 PRN Reason  Start Time


 Stop Time Status Last Admin


Dose Admin


 


 Acetaminophen


  (Tylenol)  500 mg  Q6H  PRN


 ORAL


 PAIN 1-6  10/7/19 22:15


 11/5/19 10:14   


 


 


 Al Hydroxide/Mg


 Hydroxide


  (Mylanta)  30 ml  Q6H  PRN


 ORAL


 nausea/acid reflux  10/8/19 12:00


 11/7/19 11:59   


 


 


 Amiodarone HCl


  (Cordarone)  400 mg  DAILY


 ORAL


   10/10/19 09:00


 11/6/19 20:59  10/10/19 09:08


 


 


 Amoxicillin/


 Clavulanate


 Potassium


  (Augmentin)  500 mg  EVERY 12  HOURS


 ORAL


   10/8/19 21:00


 10/15/19 20:59  10/10/19 09:07


 


 


 Aspirin


  (ASA)  81 mg  DAILY


 ORAL


   10/8/19 09:00


 11/3/19 11:14  10/10/19 09:09


 


 


 Atorvastatin


 Calcium


  (Lipitor)  20 mg  BEDTIME


 ORAL


   10/7/19 21:00


 11/3/19 20:59  10/9/19 21:21


 


 


 Barium Sulfate


  (Readi-Cat 2)  450 ml  NOW  PRN


 ORAL


 Radiology Procedure  10/9/19 13:45


 10/11/19 13:38   


 


 


 Carvedilol


  (Coreg)  25 mg  EVERY 12  HOURS


 ORAL


   10/7/19 21:00


 11/6/19 20:59  10/10/19 09:09


 


 


 Clopidogrel


 Bisulfate


  (Plavix)  75 mg  DAILY


 ORAL


   10/8/19 09:00


 11/3/19 11:14  10/10/19 09:10


 


 


 Dextrose


  (Dextrose 50%)  25 ml  Q30M  PRN


 IV


 Hypoglycemia  10/8/19 20:30


 11/7/19 20:29   


 


 


 Dextrose


  (Dextrose 50%)  50 ml  Q30M  PRN


 IV


 Hypoglycemia  10/8/19 20:30


 11/7/19 20:29   


 


 


 Diatrizoate


 Meglum/


 Diatrizoate Sod


  (Gastrografin)  30 ml  NOW  PRN


 ORAL


 Radiology Procedure  10/9/19 13:45


 10/11/19 13:38   


 


 


 Docusate Sodium


  (Colace)  100 mg  TID


 ORAL


   10/9/19 18:00


 11/7/19 17:59  10/10/19 13:38


 


 


 Finasteride


  (Proscar)  5 mg  DAILY


 ORAL


   10/8/19 09:00


 11/7/19 08:59  10/10/19 09:09


 


 


 Furosemide


  (Lasix)  40 mg  DAILY


 ORAL


   10/10/19 14:00


 11/9/19 13:59   


 


 


 Hydralazine HCl


  (Apresoline)  50 mg  Q8HR


 ORAL


   10/9/19 14:00


 11/7/19 13:59  10/10/19 13:37


 


 


 Insulin Aspart


  (NovoLOG)    BEFORE MEALS AND  HS


 SUBQ


   10/8/19 21:00


 11/7/19 20:59  10/10/19 06:23


 


 


 Insulin Detemir


  (Levemir)  15 units  BEDTIME


 SUBQ


   10/7/19 22:30


 11/3/19 22:29  10/9/19 21:29


 


 


 Insulin Detemir


  (Levemir)  25 units  DAILY


 SUBQ


   10/8/19 09:00


 11/3/19 11:14  10/10/19 09:20


 


 


 Isosorbide


 Mononitrate


  (Imdur)  60 mg  DAILY


 ORAL


   10/10/19 09:00


 11/7/19 08:59  10/10/19 09:10


 


 


 Methimazole


  (Tapazole)  20 mg  DAILY


 ORAL


   10/10/19 09:00


 11/9/19 08:59  10/10/19 09:08


 


 


 Nateglinide


  (Starlix)  120 mg  TIAC


 ORAL


   10/10/19 06:30


 11/9/19 06:29  10/10/19 11:53


 


 


 Ondansetron HCl


  (Zofran ODT)  4 mg  Q6H  PRN


 ORAL


 Nausea & Vomiting  10/8/19 10:15


 11/7/19 10:14  10/8/19 17:55


 


 


 Pantoprazole


  (Protonix)  40 mg  DAILY


 ORAL


   10/11/19 09:00


 11/10/19 08:59   


 


 


 Sucralfate


  (Carafate)  1 gm  FOUR TIMES A  DAY


 ORAL


   10/9/19 18:00


 11/8/19 17:59  10/10/19 13:37


 


 


 Tamsulosin HCl


  (Flomax)  0.4 mg  BID


 ORAL


   10/8/19 18:00


 11/5/19 20:59  10/10/19 09:10


 


 


 Tramadol HCl


  (Ultram)  50 mg  Q6H  PRN


 ORAL


 Severe Pain (Pain Scale 7-10)  10/7/19 22:15


 10/13/19 10:14   


 

















Ace Fraire MD Oct 10, 2019 14:27

## 2019-10-10 NOTE — NEPHROLOGY PROGRESS NOTE
Assessment/Plan


Problem List:  


(1) Renal failure (ARF), acute on chronic


(2) Atrial fibrillation with rapid ventricular response


(3) HTN (hypertension)


(4) Cardiomyopathy


Assessment





Renal failure- acute on chronic


CardioMyopathy and low EjFx - CHF


Anemia


HTN


DM / Nephropathy


Elevated troponin I


At Fib


BPH


Plan





Adjust BP meds-


Aim to reduce afterload


avoid nephrotoxics


slow diuresis


antibiotics


gastric support


urine studies


per orders





Subjective


ROS Limited/Unobtainable:  No


Constitutional:  Reports: malaise





Objective


Objective





Last 24 Hour Vital Signs








  Date Time  Temp Pulse Resp B/P (MAP) Pulse Ox O2 Delivery O2 Flow Rate FiO2


 


10/10/19 09:10    138/65    


 


10/10/19 09:09  78  138/65    


 


10/10/19 08:05  78 18  94 Nasal Cannula 2.0 28


 


10/10/19 08:05     94 Nasal Cannula 2.0 28


 


10/10/19 08:00 97.5 76 20 138/65 (89) 97   


 


10/10/19 06:21    127/62    


 


10/10/19 04:00  73      


 


10/10/19 04:00 97.7 75 18 127/62 (83) 97   


 


10/10/19 00:00  78      


 


10/10/19 00:00 98.1 74 18 101/54 (70) 97   


 


10/9/19 21:22    134/67    


 


10/9/19 21:21  82  134/67    


 


10/9/19 20:02  81 20  99 Nasal Cannula 3.0 32


 


10/9/19 20:02     99 Nasal Cannula 3.0 32


 


10/9/19 20:00  80      


 


10/9/19 20:00 97.9 82 20 134/67 (89) 99   


 


10/9/19 16:00  74      


 


10/9/19 16:00 97.9 74 20 130/68 (88) 99   


 


10/9/19 14:22    135/68    


 


10/9/19 13:06    135/68    


 


10/9/19 12:00  72      


 


10/9/19 12:00 98.5 71 20 135/68 (90) 97   

















Intake and Output  


 


 10/9/19 10/10/19





 18:59 06:59


 


Intake Total  120 ml


 


Output Total  300 ml


 


Balance  -180 ml


 


  


 


Intake Oral  120 ml


 


Output Urine Total  300 ml


 


# Bowel Movements  1











Current Medications








 Medications


  (Trade)  Dose


 Ordered  Sig/Sherly


 Route


 PRN Reason  Start Time


 Stop Time Status Last Admin


Dose Admin


 


 Acetaminophen


  (Tylenol)  500 mg  Q6H  PRN


 ORAL


 PAIN 1-6  10/7/19 22:15


 11/5/19 10:14   


 


 


 Al Hydroxide/Mg


 Hydroxide


  (Mylanta)  30 ml  Q6H  PRN


 ORAL


 nausea/acid reflux  10/8/19 12:00


 11/7/19 11:59   


 


 


 Amiodarone HCl


  (Cordarone)  400 mg  DAILY


 ORAL


   10/10/19 09:00


 11/6/19 20:59  10/10/19 09:08


 


 


 Amoxicillin/


 Clavulanate


 Potassium


  (Augmentin)  500 mg  EVERY 12  HOURS


 ORAL


   10/8/19 21:00


 10/15/19 20:59  10/10/19 09:07


 


 


 Aspirin


  (ASA)  81 mg  DAILY


 ORAL


   10/8/19 09:00


 11/3/19 11:14  10/10/19 09:09


 


 


 Atorvastatin


 Calcium


  (Lipitor)  20 mg  BEDTIME


 ORAL


   10/7/19 21:00


 11/3/19 20:59  10/9/19 21:21


 


 


 Barium Sulfate


  (Readi-Cat 2)  450 ml  NOW  PRN


 ORAL


 Radiology Procedure  10/9/19 13:45


 10/11/19 13:38   


 


 


 Carvedilol


  (Coreg)  25 mg  EVERY 12  HOURS


 ORAL


   10/7/19 21:00


 11/6/19 20:59  10/10/19 09:09


 


 


 Clopidogrel


 Bisulfate


  (Plavix)  75 mg  DAILY


 ORAL


   10/8/19 09:00


 11/3/19 11:14  10/10/19 09:10


 


 


 Dextrose


  (Dextrose 50%)  25 ml  Q30M  PRN


 IV


 Hypoglycemia  10/8/19 20:30


 11/7/19 20:29   


 


 


 Dextrose


  (Dextrose 50%)  50 ml  Q30M  PRN


 IV


 Hypoglycemia  10/8/19 20:30


 11/7/19 20:29   


 


 


 Diatrizoate


 Meglum/


 Diatrizoate Sod


  (Gastrografin)  30 ml  NOW  PRN


 ORAL


 Radiology Procedure  10/9/19 13:45


 10/11/19 13:38   


 


 


 Docusate Sodium


  (Colace)  100 mg  TID


 ORAL


   10/9/19 18:00


 11/7/19 17:59  10/10/19 09:08


 


 


 Finasteride


  (Proscar)  5 mg  DAILY


 ORAL


   10/8/19 09:00


 11/7/19 08:59  10/10/19 09:09


 


 


 Hydralazine HCl


  (Apresoline)  50 mg  Q8HR


 ORAL


   10/9/19 14:00


 11/7/19 13:59  10/10/19 06:21


 


 


 Insulin Aspart


  (NovoLOG)    BEFORE MEALS AND  HS


 SUBQ


   10/8/19 21:00


 11/7/19 20:59  10/10/19 06:23


 


 


 Insulin Detemir


  (Levemir)  15 units  BEDTIME


 SUBQ


   10/7/19 22:30


 11/3/19 22:29  10/9/19 21:29


 


 


 Insulin Detemir


  (Levemir)  25 units  DAILY


 SUBQ


   10/8/19 09:00


 11/3/19 11:14  10/10/19 09:20


 


 


 Isosorbide


 Mononitrate


  (Imdur)  60 mg  DAILY


 ORAL


   10/10/19 09:00


 11/7/19 08:59  10/10/19 09:10


 


 


 Methimazole


  (Tapazole)  20 mg  DAILY


 ORAL


   10/10/19 09:00


 11/9/19 08:59  10/10/19 09:08


 


 


 Nateglinide


  (Starlix)  120 mg  TIAC


 ORAL


   10/10/19 06:30


 11/9/19 06:29  10/10/19 06:21


 


 


 Ondansetron HCl


  (Zofran ODT)  4 mg  Q6H  PRN


 ORAL


 Nausea & Vomiting  10/8/19 10:15


 11/7/19 10:14  10/8/19 17:55


 


 


 Pantoprazole


  (Protonix)  40 mg  EVERY 12  HOURS


 ORAL


   10/10/19 21:00


 11/9/19 20:59 UNV  


 


 


 Sucralfate


  (Carafate)  1 gm  FOUR TIMES A  DAY


 ORAL


   10/9/19 18:00


 11/8/19 17:59  10/10/19 09:09


 


 


 Tamsulosin HCl


  (Flomax)  0.4 mg  BID


 ORAL


   10/8/19 18:00


 11/5/19 20:59  10/10/19 09:10


 


 


 Tramadol HCl


  (Ultram)  50 mg  Q6H  PRN


 ORAL


 Severe Pain (Pain Scale 7-10)  10/7/19 22:15


 10/13/19 10:14   


 





Laboratory Tests


10/10/19 04:20: Urine Eosinophils None seen


10/10/19 05:50: 


White Blood Count 12.4H, Red Blood Count 3.41L, Hemoglobin 9.8L, Hematocrit 

29.6L, Mean Corpuscular Volume 87, Mean Corpuscular Hemoglobin 28.6, Mean 

Corpuscular Hemoglobin Concent 33.0, Red Cell Distribution Width 12.9, Platelet 

Count 271, Mean Platelet Volume 7.3, Neutrophils (%) (Auto) 75.8H, Lymphocytes (

%) (Auto) 13.6L, Monocytes (%) (Auto) 8.2, Eosinophils (%) (Auto) 2.0, 

Basophils (%) (Auto) 0.5, Sodium Level 141, Potassium Level 4.4, Chloride Level 

108H, Carbon Dioxide Level 25, Anion Gap 8, Blood Urea Nitrogen 32H, Creatinine 

2.1H, Estimat Glomerular Filtration Rate , Glucose Level 151H, Uric Acid 9.2H, 

Calcium Level 8.5, Phosphorus Level 3.7, Magnesium Level 2.3, Total Bilirubin 

0.4, Aspartate Amino Transf (AST/SGOT) 19, Alanine Aminotransferase (ALT/SGPT) 

23, Alkaline Phosphatase 158H, Troponin I 0.048, C-Reactive Protein, 

Quantitative 3.4H, Pro-B-Type Natriuretic Peptide 6338H, Total Protein 6.2L, 

Albumin 2.2L, Globulin 4.0, Albumin/Globulin Ratio 0.6L, Thyroid Stimulating 

Immunoglobulin [Pending], Thyroglobulin Antibody [Pending]


Height (Feet):  5


Height (Inches):  6.00


Weight (Pounds):  169


General Appearance:  no apparent distress


Cardiovascular:  normal rate


Respiratory/Chest:  lungs clear


Abdomen:  soft


Objective


no change











James Feliz MD Oct 10, 2019 09:34

## 2019-10-11 NOTE — DISCHARGE SUMMARY
Discharge Summary


Hospital Course


Date of Admission


Oct 4, 2019 at 02:46


Date of Discharge


Oct 10, 2019 at 20:04


Admitting Diagnosis


CHF, atrial fibrillation, hyperthyroidism, pneumonia, sepsis


HPI


Warner Dyson is a 74 year old male who was admitted on Oct 4, 2019 at 02:

48 for Congestive Heart Failure


Consultations


Nephrology


Urology


Cardiology


INfectious Disease


Pulmonology


Gastroenterology


Endocrinology


General Surgery


Hospital Course


This is a 74 year old male with a PMHx Dm2, HTN, CHF, PAD s/p left iliac stent 

placement 10/25/19 and supsequent left 2nd toe amputation for gangrene/

osteomyelitis, CAD who presented with increased SOB x 5 days. Increased 

orthopnea and lower extremity swelling. Dry cough. no fevers or chills. 





Of note patient recently hospitalized last month for L second toe foul smelling 

necrosis, gangrene and osteomyelitis. Was continued on vancomycin, cefepime and 

flagyl until patient underwent LLE angiogram and left iliac? stent on 

approximately 9/25/19. Subsequently had left toe amputated. He does not endorse 

any pain, redness or drainage from the toe. Denies any other rashes, lesions 

ulcers.  





On admission the patient was in acute respiratory distress.  The patient was 

placed on BiPAP. WBC 18, lactate 2.3, proBNP 67840, troponin 0.266 and EKG 

showed atrial fibrillation with RVR. CXR showed pulmonary edema. Patient given 

IV lasix and transferred to ICU. 





Echocardiogram was performed which showed a new diagnosis of systolic heart 

failure with an ejection fraction of 30%.  No prior echocardiograms available 

for comparison.  Cardiology was consulted who helped manage the patient's 

diuresis placed the patient on Coreg and afterload reduction with hydralazine 

and Imdur.  He was titrated off BiPAP onto nasal cannula. An ACE inhibitor was 

not started because of renal failure.  Given the patient's low ejection 

fraction he needed an ischemic work-up because none was done before.  He was 

transferred to an outside hospital for a cardiac cath.





On admission the patient was in atrial fibrillation with RVR which is a new 

diagnosis.  His rate was controlled with a diltiazem drip, metoprolol 

eventually switched to Coreg, and digoxin.  His rate was controlled.  An 

extensive discussion was performed using a  to discuss the 

risks and benefits of anticoagulation given that the patient was already on 

aspirin and Plavix for a prior stent.  The patient agreed to anticoagulation 

and he was started on Eliquis 2.5 mg p.o. twice daily (renal dosing). He did 

not have any significant bleeding during the hospitalization





On hospital day 4 the patient began to complain of persistent diffuse abdominal 

pain.  A CT abdomen pelvis was performed which showed cecal diverticulitis.  

Surgery and gastroenterology was consulted who recommended no acute 

intervention.  He was continued on Augmentin which was also treating a presumed 

pneumonia.  His WBC down trended and vital signs remained stable.





Nephrology was consulted for acute on presumed chronic renal failure and 

assisted with blood pressure control and diuresis.  Renal ultrasound was 

normal. his creatinine stabilized around 2.





The patient also developed acute renal urinary retention which is a new 

problem.  A Benjamin catheter was inserted and he was seen by urology who 

recommended tamsulosin and finasteride and outpatient follow-up for a voiding 

trial. 





The patient was also diagnosed with hyperthyroidism with a TSH of 0.05 and a T4 

of 1.95.  He was seen by endocrinology Dr. Lang who recommended to start the 

patient on methimazole.  Need to follow-up on antithyroid antibodies that are 

currently pending. 





The patient was hemodynamically stable and transferred to outside hospital for 

cardiac cath. 





#Cecal diverticulitis, present on admission. Patient did not endorse any 

abdominal pain on admission and was nontender on exam.  However after 4 days in 

the hospital the patient began to experience more pain, WBC increased, and 

became more tender on exam


#sepsis (WBC, heart rate, RR) 2/2 diverticulitis vs. pneumonia, resolved


#Clostridium perfingens in 1/2 blood culture- likely contaminant per ID


> 6 the patient has had abdominal pain for the past 6 days.  CT abdomen pelvis 

showed cecal diverticulitis.


> abdominal Ultrasound negative: S/p cholecystectomy


> C diff negative


> ESR 74, CRP 1.4, unlikely osteomyelitis


-continue Protonix daily- appears to help symptoms


-Appreciate GI consult: Dr. Ga


-Appreciated surgery consult: Dr. Meléndez - no surgical intervention


-Check a stool occult blood


-Continue Augmentin


-Appreciate ID recommendations: Dr. Bergman





#hyperthyroidism, new diagnosis


> TSH 0.05, T4 1.95


- Appreciate Endocrinology consult: Dr. Alexander


-Per endocrinology patient started on methimazole


-follow up on anti-TSI and anti thyroid ab





#acute hypoxic respiratory failure 2/2 systolic CHF exacerbation (new diagnosis

) and possible pneumonia- no known history of CHF. Also treating for possible 

pneumonia - Slightly worsened today.  Will resume diuretics


- telemetry


- titrate SpO2 to keep O2 >92%


- f/u blood cultures. 1/4 sets clostridium perfingens. Likely contaminant per 

ID. Surveilance cultures negative to date


- f/u sputum culture


- s/p Vancomycin, Cefepime and Flagyl


- Change to Augmentin per ID - Day 7. will need a total of 10 days


- Resume Lasix 40 mg p.o. daily


- Continue afterload reduction with hydralazine and Imdur per cardiology and 

nephrology


Patient- ACE-I contraindicated in setting of acute renal failure


- Per cardiology given low EF will need ischemia workup. Per cardiology, an 

extensive discussion was performed at bedside using a  area, 

to discuss the need for an ischemia workup including a cardiac angiogram.  The 

patient initially declined any further cardiac intervention but now agrees. 

Pending insurance authorization for transfer to [facility] for cariac 

cath. 


- Informed by outside hospital to keep patient NPO for possible procedure today


- appreciate Cardiology recommendations: Dr. Malave


- Appreciate Infectious Disease recommendations: Dr. Bergman





#Atrial fibrillation with RVR, new diagnosis. - IMPROVINg, 


CHADS-VASC 5 - risk of stroke 7.2% per year


HASBLED - 3 - risk of bleeding 5.8% per year


> Explained to patient risks and benefits of anticoagulation with Asa/Plavix 

and an anticoagulant. Patient understands the risks of stroke are > than risk 

of bleeding and would like to take the anticoagulant. Will start patient on 

eliquis renal dosing 2.5mg PO BID


- s/p Diltiazem Gtt


- Coreg and digoxin for heart rate controlled


- holding eliquis for now due to pending cardiac cath


- appreicate cardiology recommendations





#acute urinary retention, new problem


> UA and urine culture negative


- Inserted benjamin on 10/6/10


- Tamsulosin 0.4mg PO daily


- Finasteride


- Appreciate urology recommendations: Dr. Jiang.


- Per urology continue benjamin on discharge. Follow up as outpatient for voiding 

trial





#Acute on Chronic Renal failure 2/2 CHF exacerbation and urinary obstruction - 

Stabilizing. Not back to baseline yet


> Baseline Cr 1.5


> Renal ultrasound negative


- diuresis as above


- continue to trend 


- avoid nephrotoxins


- replace lytes


- Appreciate nephrology consultation: Dr. Shah





#Hypertension


#hyperlipidemia


#CAD


-hold home amlodipine


- continue Coreg as above


- ASA/Plavix as above





#hypokalemia


#hypomagnesemia


- replete


- continue to monitor with labs





 #lactic acidosis suspected 2/2 CHF exacerbation vs. sepsis - RESOLVED


- trended down to 1


- management as above





#S/p left 2nd toe amputation due to gangrene/osteomyelitis


#s/p recent left iliac stent on ASA/Plavix 10 days ago.


> ESR 73, CRP 1.4


- wound consult


- request records from [facility]





#Diabetes mellitus type 1 per patient (suspect this is an error and has type 2) 

with hyperglycemia-  improved


> Hemoglobin A1C 8.6 on 10/6/10


- accuchecks Q6hr


- Diabetic diet


- Levemir 25 units QAM and 15 units QPM (home is 25 and 30)


- SSI, average


- nateglinide 120mg TIDAC per endocrinology recommendations





FENPPx


DVT PPx: eliquis on hold for cardiac cath, SCDs


GI PPX: protonix


Fluids: none


Diet: Diabetic diet


Lines: none


PT/OT: pending





Dispo: To OSH for cardiac cath 





D/w RN, patient, Cardiology, Pulmonology, Gastroenterology, Surgery. I spent >

30 minutes on this discharge. >50% spent on counseling and care coordination. 





Time of note may not reflect time patient was seen





Discharge


Condition Upon Discharge:  stable


Discharge Disposition


Patient was discharged to [facility])


Discharge Diagnoses:  


(1) Acute respiratory failure with hypoxia


(2) Hyperthyroidism


(3) Atrial fibrillation with rapid ventricular response


(4) Acute kidney injury superimposed on CKD


(5) Cecal diverticulitis


(6) Hypoxemia


(7) HTN (hypertension)


(8) DM (diabetes mellitus)


(9) Cardiomyopathy


(10) Cholelithiasis


(11) Bladder outlet obstruction


(12) Abdominal pain


(13) Systolic heart failure


(14) CHF exacerbation











Reynaldo Schaffer D.O. Oct 11, 2019 21:06

## 2019-10-11 NOTE — CARDIOLOGY REPORT
--------------- APPROVED REPORT --------------





EKG Measurement

Heart [id number]

[id number]

[id number]

[id number]

[id number]





Sinus rhythm with 1st degree AV block

Septal infarct, age undetermined

Lateral infarct, age undetermined

Abnormal ECG